# Patient Record
Sex: FEMALE | Race: WHITE | Employment: OTHER | URBAN - METROPOLITAN AREA
[De-identification: names, ages, dates, MRNs, and addresses within clinical notes are randomized per-mention and may not be internally consistent; named-entity substitution may affect disease eponyms.]

---

## 2017-02-07 PROBLEM — R31.29 MICROHEMATURIA: Status: ACTIVE | Noted: 2017-02-07

## 2018-10-01 PROBLEM — R19.5 POSITIVE FIT (FECAL IMMUNOCHEMICAL TEST): Status: ACTIVE | Noted: 2018-10-01

## 2019-03-26 PROBLEM — R19.5 POSITIVE FIT (FECAL IMMUNOCHEMICAL TEST): Status: RESOLVED | Noted: 2018-10-01 | Resolved: 2019-03-26

## 2021-12-23 PROBLEM — N18.32 CHRONIC RENAL FAILURE, STAGE 3B (HCC): Status: ACTIVE | Noted: 2021-12-23

## 2023-01-09 ENCOUNTER — HOSPITAL ENCOUNTER (INPATIENT)
Age: 83
LOS: 8 days | Discharge: NURSING, REHAB OR LONG-TERM CARE | DRG: 981 | End: 2023-01-17
Attending: EMERGENCY MEDICINE | Admitting: INTERNAL MEDICINE
Payer: COMMERCIAL

## 2023-01-09 DIAGNOSIS — R41.82 ALTERED MENTAL STATUS, UNSPECIFIED ALTERED MENTAL STATUS TYPE: Primary | ICD-10-CM

## 2023-01-09 DIAGNOSIS — E87.1 HYPONATREMIA: ICD-10-CM

## 2023-01-09 DIAGNOSIS — M19.90 ARTHRITIS: ICD-10-CM

## 2023-01-09 LAB
ABSOLUTE EOS #: <0.03 K/UL (ref 0–0.44)
ABSOLUTE IMMATURE GRANULOCYTE: 0.28 K/UL (ref 0–0.3)
ABSOLUTE LYMPH #: 1.01 K/UL (ref 1.1–3.7)
ABSOLUTE MONO #: 1.1 K/UL (ref 0.1–1.2)
ACETAMINOPHEN LEVEL: <5 UG/ML (ref 10–30)
ALBUMIN SERPL-MCNC: 3.8 G/DL (ref 3.5–5.2)
ALBUMIN/GLOBULIN RATIO: 1.1 (ref 1–2.5)
ALP BLD-CCNC: 82 U/L (ref 35–104)
ALT SERPL-CCNC: 22 U/L (ref 5–33)
AMMONIA: 25 UMOL/L (ref 11–51)
ANION GAP SERPL CALCULATED.3IONS-SCNC: 17 MMOL/L (ref 9–17)
ANION GAP: 17 MMOL/L (ref 7–16)
AST SERPL-CCNC: 41 U/L
BASOPHILS # BLD: 0 % (ref 0–2)
BASOPHILS ABSOLUTE: <0.03 K/UL (ref 0–0.2)
BILIRUB SERPL-MCNC: 0.5 MG/DL (ref 0.3–1.2)
BILIRUBIN URINE: NEGATIVE
BUN BLDV-MCNC: 9 MG/DL (ref 8–23)
CALCIUM SERPL-MCNC: 8.8 MG/DL (ref 8.6–10.4)
CASTS UA: NORMAL /LPF (ref 0–8)
CHLORIDE BLD-SCNC: 82 MMOL/L (ref 98–107)
CHLORIDE, UR: 92 MMOL/L
CO2: 17 MMOL/L (ref 20–31)
COLOR: YELLOW
CREAT SERPL-MCNC: 0.71 MG/DL (ref 0.5–0.9)
EGFR, POC: >60 ML/MIN/1.73M2
EOSINOPHILS RELATIVE PERCENT: 0 % (ref 1–4)
EPITHELIAL CELLS UA: NORMAL /HPF (ref 0–5)
ETHANOL PERCENT: <0.01 %
ETHANOL: <10 MG/DL
GFR SERPL CREATININE-BSD FRML MDRD: >60 ML/MIN/1.73M2
GLUCOSE BLD-MCNC: 104 MG/DL (ref 74–100)
GLUCOSE BLD-MCNC: 98 MG/DL (ref 70–99)
GLUCOSE URINE: NEGATIVE
HCO3 VENOUS: 19.2 MMOL/L (ref 22–29)
HCT VFR BLD CALC: 31.9 % (ref 36.3–47.1)
HEMOGLOBIN: 11.3 G/DL (ref 11.9–15.1)
IMMATURE GRANULOCYTES: 3 %
INR BLD: 1
KETONES, URINE: ABNORMAL
LEUKOCYTE ESTERASE, URINE: NEGATIVE
LIPASE: 51 U/L (ref 13–60)
LYMPHOCYTES # BLD: 9 % (ref 24–43)
MCH RBC QN AUTO: 29.7 PG (ref 25.2–33.5)
MCHC RBC AUTO-ENTMCNC: 35.4 G/DL (ref 28.4–34.8)
MCV RBC AUTO: 83.9 FL (ref 82.6–102.9)
MONOCYTES # BLD: 10 % (ref 3–12)
NEGATIVE BASE EXCESS, VEN: 4 (ref 0–2)
NITRITE, URINE: NEGATIVE
NRBC AUTOMATED: 0 PER 100 WBC
O2 SAT, VEN: 78 % (ref 60–85)
OSMOLALITY URINE: 442 MOSM/KG (ref 80–1300)
PCO2, VEN: 28 MM HG (ref 41–51)
PDW BLD-RTO: 13 % (ref 11.8–14.4)
PH UA: 7.5 (ref 5–8)
PH VENOUS: 7.44 (ref 7.32–7.43)
PLATELET # BLD: ABNORMAL K/UL (ref 138–453)
PLATELET, FLUORESCENCE: 381 K/UL (ref 138–453)
PLATELET, IMMATURE FRACTION: 6.5 % (ref 1.1–10.3)
PO2, VEN: 40 MM HG (ref 30–50)
POC BUN: 8 MG/DL (ref 8–26)
POC CHLORIDE: 83 MMOL/L (ref 98–107)
POC CREATININE: 0.76 MG/DL (ref 0.51–1.19)
POC HEMATOCRIT: 38 % (ref 36–46)
POC HEMOGLOBIN: 13 G/DL (ref 12–16)
POC IONIZED CALCIUM: 1.08 MMOL/L (ref 1.15–1.33)
POC LACTIC ACID: 0.84 MMOL/L (ref 0.56–1.39)
POC POTASSIUM: 3.5 MMOL/L (ref 3.5–4.5)
POC SODIUM: 117 MMOL/L (ref 138–146)
POC TCO2: 18 MMOL/L (ref 22–30)
POTASSIUM SERPL-SCNC: 3.5 MMOL/L (ref 3.7–5.3)
PRO-BNP: 884 PG/ML
PROTEIN UA: ABNORMAL
PROTHROMBIN TIME: 10.8 SEC (ref 9.1–12.3)
RBC # BLD: 3.8 M/UL (ref 3.95–5.11)
RBC UA: NORMAL /HPF (ref 0–4)
SALICYLATE LEVEL: <1 MG/DL (ref 3–10)
SEG NEUTROPHILS: 78 % (ref 36–65)
SEGMENTED NEUTROPHILS ABSOLUTE COUNT: 8.69 K/UL (ref 1.5–8.1)
SODIUM BLD-SCNC: 116 MMOL/L (ref 135–144)
SODIUM BLD-SCNC: 117 MMOL/L (ref 135–144)
SODIUM,UR: 114 MMOL/L
SPECIFIC GRAVITY UA: 1.04 (ref 1–1.03)
TOTAL PROTEIN: 7.3 G/DL (ref 6.4–8.3)
TOXIC TRICYCLIC SC,BLOOD: NEGATIVE
TROPONIN, HIGH SENSITIVITY: 15 NG/L (ref 0–14)
TSH SERPL DL<=0.05 MIU/L-ACNC: 7.06 UIU/ML (ref 0.3–5)
TURBIDITY: CLEAR
URINE HGB: ABNORMAL
UROBILINOGEN, URINE: NORMAL
WBC # BLD: 11.1 K/UL (ref 3.5–11.3)
WBC UA: NORMAL /HPF (ref 0–5)

## 2023-01-09 PROCEDURE — 96374 THER/PROPH/DIAG INJ IV PUSH: CPT

## 2023-01-09 PROCEDURE — 2580000003 HC RX 258: Performed by: INTERNAL MEDICINE

## 2023-01-09 PROCEDURE — 82947 ASSAY GLUCOSE BLOOD QUANT: CPT

## 2023-01-09 PROCEDURE — 84300 ASSAY OF URINE SODIUM: CPT

## 2023-01-09 PROCEDURE — G0480 DRUG TEST DEF 1-7 CLASSES: HCPCS

## 2023-01-09 PROCEDURE — 6360000002 HC RX W HCPCS

## 2023-01-09 PROCEDURE — 83935 ASSAY OF URINE OSMOLALITY: CPT

## 2023-01-09 PROCEDURE — 80053 COMPREHEN METABOLIC PANEL: CPT

## 2023-01-09 PROCEDURE — 83735 ASSAY OF MAGNESIUM: CPT

## 2023-01-09 PROCEDURE — 83930 ASSAY OF BLOOD OSMOLALITY: CPT

## 2023-01-09 PROCEDURE — 85055 RETICULATED PLATELET ASSAY: CPT

## 2023-01-09 PROCEDURE — 82330 ASSAY OF CALCIUM: CPT

## 2023-01-09 PROCEDURE — 82140 ASSAY OF AMMONIA: CPT

## 2023-01-09 PROCEDURE — 84100 ASSAY OF PHOSPHORUS: CPT

## 2023-01-09 PROCEDURE — 80307 DRUG TEST PRSMV CHEM ANLYZR: CPT

## 2023-01-09 PROCEDURE — 84295 ASSAY OF SERUM SODIUM: CPT

## 2023-01-09 PROCEDURE — 84443 ASSAY THYROID STIM HORMONE: CPT

## 2023-01-09 PROCEDURE — 84439 ASSAY OF FREE THYROXINE: CPT

## 2023-01-09 PROCEDURE — 85025 COMPLETE CBC W/AUTO DIFF WBC: CPT

## 2023-01-09 PROCEDURE — 6360000002 HC RX W HCPCS: Performed by: STUDENT IN AN ORGANIZED HEALTH CARE EDUCATION/TRAINING PROGRAM

## 2023-01-09 PROCEDURE — 84484 ASSAY OF TROPONIN QUANT: CPT

## 2023-01-09 PROCEDURE — 80143 DRUG ASSAY ACETAMINOPHEN: CPT

## 2023-01-09 PROCEDURE — 85610 PROTHROMBIN TIME: CPT

## 2023-01-09 PROCEDURE — 06HY33Z INSERTION OF INFUSION DEVICE INTO LOWER VEIN, PERCUTANEOUS APPROACH: ICD-10-PCS | Performed by: EMERGENCY MEDICINE

## 2023-01-09 PROCEDURE — 93005 ELECTROCARDIOGRAM TRACING: CPT | Performed by: STUDENT IN AN ORGANIZED HEALTH CARE EDUCATION/TRAINING PROGRAM

## 2023-01-09 PROCEDURE — 99285 EMERGENCY DEPT VISIT HI MDM: CPT

## 2023-01-09 PROCEDURE — 83605 ASSAY OF LACTIC ACID: CPT

## 2023-01-09 PROCEDURE — 80179 DRUG ASSAY SALICYLATE: CPT

## 2023-01-09 PROCEDURE — 83880 ASSAY OF NATRIURETIC PEPTIDE: CPT

## 2023-01-09 PROCEDURE — 82436 ASSAY OF URINE CHLORIDE: CPT

## 2023-01-09 PROCEDURE — 80051 ELECTROLYTE PANEL: CPT

## 2023-01-09 PROCEDURE — 85014 HEMATOCRIT: CPT

## 2023-01-09 PROCEDURE — 2000000000 HC ICU R&B

## 2023-01-09 PROCEDURE — 83690 ASSAY OF LIPASE: CPT

## 2023-01-09 PROCEDURE — 81001 URINALYSIS AUTO W/SCOPE: CPT

## 2023-01-09 PROCEDURE — 82803 BLOOD GASES ANY COMBINATION: CPT

## 2023-01-09 PROCEDURE — 84520 ASSAY OF UREA NITROGEN: CPT

## 2023-01-09 PROCEDURE — 82565 ASSAY OF CREATININE: CPT

## 2023-01-09 PROCEDURE — 96375 TX/PRO/DX INJ NEW DRUG ADDON: CPT

## 2023-01-09 RX ORDER — MORPHINE SULFATE 4 MG/ML
2 INJECTION, SOLUTION INTRAMUSCULAR; INTRAVENOUS ONCE
Status: COMPLETED | OUTPATIENT
Start: 2023-01-09 | End: 2023-01-09

## 2023-01-09 RX ORDER — ONDANSETRON 2 MG/ML
INJECTION INTRAMUSCULAR; INTRAVENOUS
Status: COMPLETED
Start: 2023-01-09 | End: 2023-01-09

## 2023-01-09 RX ORDER — ONDANSETRON 2 MG/ML
4 INJECTION INTRAMUSCULAR; INTRAVENOUS ONCE
Status: COMPLETED | OUTPATIENT
Start: 2023-01-09 | End: 2023-01-09

## 2023-01-09 RX ORDER — 3% SODIUM CHLORIDE 3 G/100ML
30 INJECTION, SOLUTION INTRAVENOUS CONTINUOUS
Status: DISCONTINUED | OUTPATIENT
Start: 2023-01-09 | End: 2023-01-10

## 2023-01-09 RX ADMIN — ONDANSETRON 4 MG: 2 INJECTION INTRAMUSCULAR; INTRAVENOUS at 21:48

## 2023-01-09 RX ADMIN — MORPHINE SULFATE 2 MG: 4 INJECTION INTRAVENOUS at 22:03

## 2023-01-09 RX ADMIN — SODIUM CHLORIDE 25 ML/HR: 3 INJECTION, SOLUTION INTRAVENOUS at 23:54

## 2023-01-09 ASSESSMENT — PAIN SCALES - GENERAL
PAINLEVEL_OUTOF10: 10
PAINLEVEL_OUTOF10: 10

## 2023-01-09 ASSESSMENT — PAIN - FUNCTIONAL ASSESSMENT: PAIN_FUNCTIONAL_ASSESSMENT: 0-10

## 2023-01-10 ENCOUNTER — APPOINTMENT (OUTPATIENT)
Dept: MRI IMAGING | Age: 83
DRG: 981 | End: 2023-01-10
Payer: COMMERCIAL

## 2023-01-10 ENCOUNTER — APPOINTMENT (OUTPATIENT)
Dept: GENERAL RADIOLOGY | Age: 83
DRG: 981 | End: 2023-01-10
Payer: COMMERCIAL

## 2023-01-10 PROBLEM — R41.82 ALTERED MENTAL STATUS: Status: ACTIVE | Noted: 2023-01-10

## 2023-01-10 PROBLEM — G45.9 TIA (TRANSIENT ISCHEMIC ATTACK): Status: ACTIVE | Noted: 2023-01-10

## 2023-01-10 PROBLEM — I67.1: Status: ACTIVE | Noted: 2023-01-10

## 2023-01-10 PROBLEM — R56.9 SEIZURE-LIKE ACTIVITY (HCC): Status: ACTIVE | Noted: 2023-01-10

## 2023-01-10 PROBLEM — I72.0 CAROTID ANEURYSM, LEFT (HCC): Status: ACTIVE | Noted: 2023-01-10

## 2023-01-10 LAB
CORTISOL: 18.2 UG/DL (ref 2.7–18.4)
EKG ATRIAL RATE: 85 BPM
EKG P AXIS: 50 DEGREES
EKG P-R INTERVAL: 164 MS
EKG Q-T INTERVAL: 388 MS
EKG QRS DURATION: 72 MS
EKG QTC CALCULATION (BAZETT): 461 MS
EKG R AXIS: 50 DEGREES
EKG T AXIS: 31 DEGREES
EKG VENTRICULAR RATE: 85 BPM
GLUCOSE BLD-MCNC: 84 MG/DL (ref 65–105)
MAGNESIUM: 1.5 MG/DL (ref 1.6–2.6)
MAGNESIUM: 1.7 MG/DL (ref 1.6–2.6)
MRSA, DNA, NASAL: NEGATIVE
PHOSPHORUS: 2.5 MG/DL (ref 2.6–4.5)
SERUM OSMOLALITY: 252 MOSM/KG (ref 275–295)
SODIUM BLD-SCNC: 113 MMOL/L (ref 135–144)
SODIUM BLD-SCNC: 114 MMOL/L (ref 135–144)
SODIUM BLD-SCNC: 120 MMOL/L (ref 135–144)
SODIUM BLD-SCNC: 121 MMOL/L (ref 135–144)
SODIUM BLD-SCNC: 122 MMOL/L (ref 135–144)
SODIUM BLD-SCNC: 123 MMOL/L (ref 135–144)
SODIUM BLD-SCNC: 123 MMOL/L (ref 135–144)
SPECIMEN DESCRIPTION: NORMAL
THYROXINE, FREE: 1.31 NG/DL (ref 0.93–1.7)
TROPONIN, HIGH SENSITIVITY: 13 NG/L (ref 0–14)

## 2023-01-10 PROCEDURE — 6360000002 HC RX W HCPCS

## 2023-01-10 PROCEDURE — 2000000000 HC ICU R&B

## 2023-01-10 PROCEDURE — 6370000000 HC RX 637 (ALT 250 FOR IP): Performed by: STUDENT IN AN ORGANIZED HEALTH CARE EDUCATION/TRAINING PROGRAM

## 2023-01-10 PROCEDURE — 87641 MR-STAPH DNA AMP PROBE: CPT

## 2023-01-10 PROCEDURE — 2580000003 HC RX 258

## 2023-01-10 PROCEDURE — 70551 MRI BRAIN STEM W/O DYE: CPT

## 2023-01-10 PROCEDURE — 95816 EEG AWAKE AND DROWSY: CPT | Performed by: PSYCHIATRY & NEUROLOGY

## 2023-01-10 PROCEDURE — 82947 ASSAY GLUCOSE BLOOD QUANT: CPT

## 2023-01-10 PROCEDURE — 99223 1ST HOSP IP/OBS HIGH 75: CPT | Performed by: PSYCHIATRY & NEUROLOGY

## 2023-01-10 PROCEDURE — 84295 ASSAY OF SERUM SODIUM: CPT

## 2023-01-10 PROCEDURE — 82533 TOTAL CORTISOL: CPT

## 2023-01-10 PROCEDURE — 99222 1ST HOSP IP/OBS MODERATE 55: CPT | Performed by: INTERNAL MEDICINE

## 2023-01-10 PROCEDURE — 93010 ELECTROCARDIOGRAM REPORT: CPT | Performed by: INTERNAL MEDICINE

## 2023-01-10 PROCEDURE — 95816 EEG AWAKE AND DROWSY: CPT

## 2023-01-10 PROCEDURE — 84484 ASSAY OF TROPONIN QUANT: CPT

## 2023-01-10 PROCEDURE — 99291 CRITICAL CARE FIRST HOUR: CPT | Performed by: INTERNAL MEDICINE

## 2023-01-10 PROCEDURE — 71045 X-RAY EXAM CHEST 1 VIEW: CPT

## 2023-01-10 PROCEDURE — 36415 COLL VENOUS BLD VENIPUNCTURE: CPT

## 2023-01-10 PROCEDURE — 6370000000 HC RX 637 (ALT 250 FOR IP)

## 2023-01-10 PROCEDURE — 83735 ASSAY OF MAGNESIUM: CPT

## 2023-01-10 PROCEDURE — 6360000002 HC RX W HCPCS: Performed by: STUDENT IN AN ORGANIZED HEALTH CARE EDUCATION/TRAINING PROGRAM

## 2023-01-10 PROCEDURE — 80048 BASIC METABOLIC PNL TOTAL CA: CPT

## 2023-01-10 RX ORDER — SODIUM CHLORIDE 9 MG/ML
INJECTION, SOLUTION INTRAVENOUS PRN
Status: DISCONTINUED | OUTPATIENT
Start: 2023-01-10 | End: 2023-01-17 | Stop reason: HOSPADM

## 2023-01-10 RX ORDER — ASPIRIN 81 MG/1
81 TABLET ORAL DAILY
Status: DISCONTINUED | OUTPATIENT
Start: 2023-01-10 | End: 2023-01-17 | Stop reason: HOSPADM

## 2023-01-10 RX ORDER — OXYCODONE HYDROCHLORIDE AND ACETAMINOPHEN 5; 325 MG/1; MG/1
1 TABLET ORAL EVERY 4 HOURS PRN
Status: DISCONTINUED | OUTPATIENT
Start: 2023-01-10 | End: 2023-01-10

## 2023-01-10 RX ORDER — ENOXAPARIN SODIUM 100 MG/ML
40 INJECTION SUBCUTANEOUS DAILY
Status: DISCONTINUED | OUTPATIENT
Start: 2023-01-10 | End: 2023-01-17 | Stop reason: HOSPADM

## 2023-01-10 RX ORDER — ACETAMINOPHEN 325 MG/1
650 TABLET ORAL EVERY 6 HOURS PRN
Status: DISCONTINUED | OUTPATIENT
Start: 2023-01-10 | End: 2023-01-17 | Stop reason: HOSPADM

## 2023-01-10 RX ORDER — SODIUM CHLORIDE 0.9 % (FLUSH) 0.9 %
5-40 SYRINGE (ML) INJECTION EVERY 12 HOURS SCHEDULED
Status: DISCONTINUED | OUTPATIENT
Start: 2023-01-10 | End: 2023-01-17 | Stop reason: HOSPADM

## 2023-01-10 RX ORDER — POLYETHYLENE GLYCOL 3350 17 G/17G
17 POWDER, FOR SOLUTION ORAL DAILY PRN
Status: DISCONTINUED | OUTPATIENT
Start: 2023-01-10 | End: 2023-01-17 | Stop reason: HOSPADM

## 2023-01-10 RX ORDER — SODIUM CHLORIDE 0.9 % (FLUSH) 0.9 %
5-40 SYRINGE (ML) INJECTION PRN
Status: DISCONTINUED | OUTPATIENT
Start: 2023-01-10 | End: 2023-01-17 | Stop reason: HOSPADM

## 2023-01-10 RX ORDER — ONDANSETRON 2 MG/ML
4 INJECTION INTRAMUSCULAR; INTRAVENOUS EVERY 6 HOURS PRN
Status: DISCONTINUED | OUTPATIENT
Start: 2023-01-10 | End: 2023-01-12 | Stop reason: SDUPTHER

## 2023-01-10 RX ORDER — ONDANSETRON 4 MG/1
4 TABLET, ORALLY DISINTEGRATING ORAL EVERY 8 HOURS PRN
Status: DISCONTINUED | OUTPATIENT
Start: 2023-01-10 | End: 2023-01-12 | Stop reason: SDUPTHER

## 2023-01-10 RX ORDER — MAGNESIUM SULFATE IN WATER 40 MG/ML
2000 INJECTION, SOLUTION INTRAVENOUS ONCE
Status: COMPLETED | OUTPATIENT
Start: 2023-01-10 | End: 2023-01-10

## 2023-01-10 RX ORDER — OXYCODONE HYDROCHLORIDE AND ACETAMINOPHEN 5; 325 MG/1; MG/1
1 TABLET ORAL EVERY 6 HOURS PRN
Status: DISCONTINUED | OUTPATIENT
Start: 2023-01-10 | End: 2023-01-13

## 2023-01-10 RX ORDER — ACETAMINOPHEN 650 MG/1
650 SUPPOSITORY RECTAL EVERY 6 HOURS PRN
Status: DISCONTINUED | OUTPATIENT
Start: 2023-01-10 | End: 2023-01-17 | Stop reason: HOSPADM

## 2023-01-10 RX ORDER — LEVOTHYROXINE SODIUM 0.07 MG/1
75 TABLET ORAL DAILY
Status: DISCONTINUED | OUTPATIENT
Start: 2023-01-10 | End: 2023-01-17 | Stop reason: HOSPADM

## 2023-01-10 RX ADMIN — LEVOTHYROXINE SODIUM 75 MCG: 75 TABLET ORAL at 08:28

## 2023-01-10 RX ADMIN — SODIUM CHLORIDE, PRESERVATIVE FREE 10 ML: 5 INJECTION INTRAVENOUS at 08:37

## 2023-01-10 RX ADMIN — MAGNESIUM SULFATE HEPTAHYDRATE 2000 MG: 40 INJECTION, SOLUTION INTRAVENOUS at 09:14

## 2023-01-10 RX ADMIN — OXYCODONE HYDROCHLORIDE AND ACETAMINOPHEN 1 TABLET: 5; 325 TABLET ORAL at 15:22

## 2023-01-10 RX ADMIN — ENOXAPARIN SODIUM 40 MG: 100 INJECTION SUBCUTANEOUS at 08:28

## 2023-01-10 RX ADMIN — Medication 81 MG: at 08:28

## 2023-01-10 RX ADMIN — ACETAMINOPHEN 650 MG: 325 TABLET ORAL at 11:41

## 2023-01-10 RX ADMIN — ONDANSETRON 4 MG: 2 INJECTION INTRAMUSCULAR; INTRAVENOUS at 05:52

## 2023-01-10 RX ADMIN — SODIUM CHLORIDE 30 ML/HR: 3 INJECTION, SOLUTION INTRAVENOUS at 07:44

## 2023-01-10 RX ADMIN — SODIUM CHLORIDE, PRESERVATIVE FREE 10 ML: 5 INJECTION INTRAVENOUS at 21:00

## 2023-01-10 RX ADMIN — SODIUM CHLORIDE, PRESERVATIVE FREE 10 ML: 5 INJECTION INTRAVENOUS at 08:36

## 2023-01-10 ASSESSMENT — PAIN DESCRIPTION - LOCATION
LOCATION: FOOT
LOCATION: GENERALIZED
LOCATION: ANKLE
LOCATION: ANKLE

## 2023-01-10 ASSESSMENT — PAIN DESCRIPTION - ORIENTATION
ORIENTATION: LEFT

## 2023-01-10 ASSESSMENT — PAIN SCALES - GENERAL
PAINLEVEL_OUTOF10: 4
PAINLEVEL_OUTOF10: 2
PAINLEVEL_OUTOF10: 4
PAINLEVEL_OUTOF10: 6
PAINLEVEL_OUTOF10: 5
PAINLEVEL_OUTOF10: 8

## 2023-01-10 NOTE — CARE COORDINATION
01/10/23 1556   Service Assessment   Patient Orientation Alert and Oriented   Cognition Alert   History Provided By Patient; Child/Family   Primary Caregiver Other (Comment)  (pt in rehab at [facility])   Accompanied By/Relationship Daughter Flakito Mcgarry Members; Children; Other (Comment)  ([facility] staff)   PCP Verified by CM Yes  Holly Lux)   Last Visit to PCP Within last 3 months   Prior Functional Level Independent in ADLs/IADLs   Current Functional Level Assistance with the following:;Mobility; Toileting;Dressing   Can patient return to prior living arrangement Yes  (Return to [facility])   Ability to make needs known: Good   Financial Resources Medicare  ([employer] [employer])   Social/Functional History   Lives With [employer] (comment)  (Currently at [facility] for [employer])   Type of Home   (Plans are to return to facility for Rehab)   ADL Assistance Needs assistance   Toileting Needs assistance   Ambulation Assistance Needs assistance   Transfer Assistance Needs assistance   Active  Yes   Mode of Transportation Car   Occupation Retired   Type of Occupation    Discharge Planning   Type of [address] [place] [address] Prior To Admission [address]   Patient expects to be discharged to: Skilled nursing facility   History of falls? [address] Provided? No   [facility] at Discharge [facility] (see comment[facility]   Condition of Participation: Discharge Planning   The Plan for Transition of Care is related to the following treatment goals: Safety, comfort   The Patient and/or Patient Representative was provided with a Choice of Provider? Patient   The Patient and/Or Patient Representative agree with the Discharge Plan?  Yes   Freedom of Choice list was provided with basic dialogue that supports the patient's individualized plan of care/goals, treatment preferences, and shares the quality data associated with the providers? Yes  (Return to [facility]- bed hold, referral sent)   Case Management Assessment  Initial Evaluation    Date/Time of Evaluation: 1/10/2023 4:04 PM  Assessment Completed by: Nadia Friedman RN    If patient is discharged prior to next notation, then this note serves as note for discharge by case management. Patient Name: Ivett Jones                   YOB: 1940  Diagnosis: Hyponatremia [E87.1]  Altered mental status, unspecified altered mental status type [R41.82]                   Date / Time: 1/9/2023  8:01 PM    Patient Admission Status: Inpatient   Readmission Risk (Low < 19, Mod (19-27), High > 27): Readmission Risk Score: 8.4    Current PCP: Stanislav Carpio MD  PCP verified by CM? (P) Yes Stanislav Carpio)    Chart Reviewed: Yes      History Provided by: (P) Patient, Child/Family  Patient Orientation: (P) Alert and Oriented    Patient Cognition: (P) Alert    Hospitalization in the last 30 days (Readmission):  No    If yes, Readmission Assessment in CM Navigator will be completed. Advance Directives:      Code Status: Full Code   Patient's Primary Decision Maker is:      Primary Decision Maker (Active):  Elsi Bullock Child - [phone]    Discharge Planning:    Patient lives with:   Type of Home: (P) [place]  Primary Care Giver: (P) Other (Comment) (pt in rehab at [facility])  Patient Support Systems include: (P) Family Members, Children, Other (Comment) (SNF staff)   Current Financial resources: (P) Medicare ([place])  Current community resources:    Current services prior to admission: (P) Gildardo Vu            Current DME:              Type of Home Care services:       ADLS  Prior functional level: (P) Independent in ADLs/IADLs  Current functional level: (P) Assistance with the following:, Mobility, Toileting, Dressing    PT AM-PAC:   /24  OT AM-PAC:   /24    Family can provide assistance at DC: Would you like Case Management to discuss the discharge plan with any other family members/significant others, and if so, who? Plans to Return to Present Housing: (P) Yes (Return to [facility])  Other Identified Issues/Barriers to RETURNING to current housing: none  Potential Assistance needed at discharge: (P) Gildardo Vu            Potential DME:    Patient expects to discharge to: (P) [address] Lei [address] for transportation at discharge:      Financial    Payor: [employer] [address] / Plan: [employer] [address] / Product Type: *No Product type* /     Does insurance require precert for SNF: Yes    Potential assistance Purchasing Medications:    Meds-to-Beds request: Yes      [facility] [address]ES [address] [address]  Phone: [phone] Fax: [phone]    [facility] [address]ES [address] [place] [address]  Phone: [phone] Fax: [phone]    [address] [place] [address] Cesar Holley [address] [address] [facility]  [address] [place] [address] [address]  Phone: [phone] Fax: [phone]    [facility] [address] R[address] BR[address]GES [address] [facility] [address] [place] [address] [phone] - F [phone]  [facility]  Phone: [phone] Fax: [phone]    Skyla Parikh [id number] Jessica Bernard, [address] [phone] Xavier Yarbrough [phone]  [address] [place] [address]  Phone: [phone] Fax: [phone]      Notes:    Factors facilitating achievement of predicted outcomes: Family support and Caregiver support    Barriers to discharge: Medical complications    Additional Case Management Notes: Bed Hold at [facility]- needs Pre-cert, will need PT/OT notes. Seizure precautions, NA Q 2 hrs. MRI bran w/o contrast today. Neuro, Nephrology, Neuro-crit following. Referral to Endovascular Neurosurgery  [phone]. Pt is a bed hold, will need pre-cert and PT/OT notes to return. Referral sent. Needs PT/OT orders when appropriate. Pt has OP appt Friday @ [facility] with Tano Chan to change cast.     The Plan for Transition of Care is related to the following treatment goals of Hyponatremia [E87.1]  Altered mental status, unspecified altered mental status type [J64.49]    IF APPLICABLE: The Patient and/or patient representative Morgan Herman and her family were provided with a choice of provider and agrees with the discharge plan. Freedom of choice list with basic dialogue that supports the patient's individualized plan of care/goals and shares the quality data associated with the providers was provided to: (P) Patient   Patient Representative Name:       The Patient and/or Patient Representative Agree with the Discharge Plan?  (P) Yes    Zeynep Weir RN  Case Management Department  Ph: [phone] Fax: [phone]

## 2023-01-10 NOTE — PLAN OF CARE
Leigha Quick, RCPPatient Assessment complete. Hyponatremia [E87.1]  Altered mental status, unspecified altered mental status type [R41.82] . Vitals:    01/10/23 0700   BP: (!) 105/57   Pulse: 75   Resp: 14   Temp:    SpO2: 100%   . Patients home meds are   Prior to Admission medications    Medication Sig Start Date End Date Taking? Authorizing Provider   vitamin C (ASCORBIC ACID) 500 MG tablet Take 500 mg by mouth daily    Historical Provider, MD   Multiple Vitamins-Minerals (THERAPEUTIC MULTIVITAMIN-MINERALS) tablet Take 1 tablet by mouth daily    Historical Provider, MD   levothyroxine (SYNTHROID) 75 MCG tablet Take 1 tablet by mouth Daily 10/11/22   Maria D Mohr MD   aspirin EC 81 MG EC tablet Take 1 tablet by mouth daily 6/1/21   Maria D Mohr MD   Calcium Carb-Cholecalciferol 600-800 MG-UNIT TABS Take 1 tablet by mouth daily    Historical Provider, MD   traMADol (ULTRAM) 50 MG tablet Take 50 mg by mouth 2 times daily as needed. 9/29/14   Historical Provider, MD       Assessment // Pt is not showing any signs of distress. Pt does not take any home breathing treatments or require home oxygen. No SOB.         RR 14  Breath Sounds: clear      Bronchodilator assessment at level  1  [x]    Bronchodilator Assessment  BRONCHODILATOR ASSESSMENT SCORE  Score 0 1 2 3 4 5   Breath Sounds   [x]  Patient Baseline []  No Wheeze good aeration []  Faint, scattered wheezing, good aeration []  Expiratory Wheezing and or moderately diminished []  Insp/Exp wheeze and/or very diminished []  Insp/Exp and/ or marked distress   Respiratory Rate   []  Patient Baseline [x]  Less than 20 []  Less than 20 []  20-25 []  Greater than 25 []  Greater than 25   Peak flow % of Pred or PB []  NA   []  Greater than 90%  []  81-90% []  71-80% []  Less than or equal to 70%  or unable to perform []  Unable due to Respiratory Distress   Dyspnea re []  Patient Baseline [x]  No SOB []  No SOB []  SOB on exertion []  SOB min activity []  At rest/acute   e FEV% Predicted       [x]  NA []  Above 69%  []  Unable []  Above 60-69%  []  Unable []  Above 50-59%  []  Unable []  Above 35-49%  []  Unable []  Less than 35%  []  Unable

## 2023-01-10 NOTE — ED TRIAGE NOTES
Pt presents to ED as a transfer from [facility]. Pt was in rehab for injury, started to have left sided weakness and facial droop at 0900. Pt found to have ICA aneurysm and hyponatremia.

## 2023-01-10 NOTE — CONSULTS
Department of stroke/Endovascular Neurology                                           Resident Consult Note                                                     Paged at: 8:12                                                     Arrived at: 8:17  ED bed: 25  Reason for Consult:   L sided weakness  Stroke attending:  Dr. Jimenes  Endovascular Neurosurgeon:   []Dr. Perez  [x]Dr. Burdick    History Obtained From:  electronic medical record    CHIEF COMPLAINT:       L sided weakness    HISTORY OF PRESENT ILLNESS:       The patient is a 82 y.o. female who presents with left upper extremity numbness and weakness, left facial droop and altered mentation.  Patient has a past medical history of CAD/PVD on aspirin and Lipitor as well as hypothyroidism on Synthroid.  Patient recently underwent fixation of right ankle by orthopedic surgery, continued with aspirin throughout.  Patient presented from acute rehab unit after they had noticed that her left upper extremity was weak and on evaluation some numbness was present.  She was taken to [facility] ED where she was evaluated for potential stroke.  CT head was unremarkable for any acute changes, CTA did not show a left ICA aneurysm but no LVO or critical stenosis.  At this time films are not available on PACS, read based on report.  Of note patient's sodium was found to be 116 on initial labs.    Last known well reportedly around 9 AM.  She was transferred to [facility] ED for medical ICU admission for management of hyponatremia.  Upon arrival to [facility], patient's left-sided weakness and numbness had resolved.  Patient does appear encephalopathic and requires repeated stimulation to arouse.  Initial NIH was 7 for level of consciousness, commands and questions, mild dysmetria in general but equal mild weakness of both legs.  With repeated prompting, patient was able to maintain both arms in the air for the full 10 seconds.  Initial  blood pressure 118, blood glucose 125. Of note patient is on tramadol 50 mg twice daily chronically. NIH Stroke Scale Total (if not done complete detailed one below):    1a.  Level of consciousness:  2 - not alert, requires repeated stimulation to attend, or is obtunded and requires strong or painful stimulation to make movements (not stereotyped)   1b. Level of consciousness questions:  1 - answers one question correctly  1c. Level of consciousness questions:  1 - performs one task correctly  2. Best Gaze:  0 - normal  3. Visual:  0 - no visual loss  4. Facial Palsy:  1 - minor paralysis (flattened nasolabial fold, asymmetric on smiling)  5a. Motor left arm:  0 - no drift, limb holds 90 (or 45) degrees for full 10 seconds  5b. Motor right arm:  0 - no drift, limb holds 90 (or 45) degrees for full 10 seconds  6a. Motor left le - drift; leg falls by the end of the 5 second period but does not hit bed  6b. Motor right le - drift; leg falls by the end of the 5 second period but does not hit bed  7. Limb Ataxia:  1 - present in one limb  8. Sensory:  0 - normal; no sensory loss  9. Best Language:  0 - no aphasia, normal  10. Dysarthria:  0 - normal  11. Extinction and Inattention:  0 - no abnormality          ABCD2 Score  (Estimate Risk of Stroke after TIA)   POINTS   Age    < 60   ? 60     [] 0  [x] 1   BP:     SBP <140 or DBP < 90   SBP ? 140 or DBP ? 90       [x] 0  [] 1   Clinical Features of TIA     Other Symptoms                 Speech Disturbances W/O Weakness   Unilateral Weakness     [] 0  [] 1  [x] 2   Duration of symptoms     < 10 Minutes                                     10-59 Minutes   ?  61 Minutes     [] 0  [x] 1  [] 2   Diabetes     No                    Yes     [x] 0  [] 1   TOTAL 4   0-3 Points: Low Risk -> Work up could be done OPD   2-Day Stroke Risk: 1%   7- Day Stroke Risk: 1.2%   90 Days Stroke Risk: 3.1%    4-5 Points: Moderate Risk    2-Day Stroke Risk: 4.1%   7- Day Stroke Risk: 5.9%    90 Days Stroke Risk: 9.8%    6-7 Points: High Risk -> Warrant Admission for Workup   2-Day Stroke Risk: 8.1%   7- Day Stroke Risk: 11.7%   90 Days Stroke Risk: 17.8%      Modified Mauricio Score Scale:     [] Zero: No symptoms at all   [] 1: No significant disability despite symptoms; able to carry out all usual duties and activities   [] 2: Slight disability; unable to carry out all previous activities, but able to look after own affairs without assistance   [] 3:Moderate disability; requiring some help, but able to walk without assistance   [x] 4: Moderately severe disability; unable to walk and attend to bodily needs without assistance   [] 5:Severe disability; bedridden, incontinent and requiring constant nursing care and attention         PAST MEDICAL HISTORY :       Past Medical History:        Diagnosis Date    Arthritis 10/08/2014    CAD (coronary artery disease) 10/08/2014    Edema 10/08/2014    Hematuria     Hypotension, unspecified 10/08/2014    Pure hypercholesterolemia 10/08/2014    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 Frank Rojas        Social History:   Social History     Socioeconomic History    Marital status:      Spouse name: Not on file    Number of children: 2    Years of education: Not on file    Highest education level: Not on file   Occupational History    Occupation:    Tobacco Use    Smoking status: Former     Packs/day: 0.50     Years: 11.00     Pack years: 5.50     Types: Cigarettes     Quit date: 3/26/2008     Years since quittin.8    Smokeless tobacco: Never   Substance and Sexual Activity    Alcohol use:  No Drug use: No    Sexual activity: Not Currently   Other Topics Concern    Not on file   Social History Narrative    Not on file     Social Determinants of Health     Financial Resource Strain: Low Risk     Difficulty of Paying Living Expenses: Not hard at all   Food Insecurity: No Food Insecurity    Worried About Running Out of Food in the Last Year: Never true    Ran Out of Food in the Last Year: Never true   Transportation Needs: Not on file   Physical Activity: Inactive    Days of Exercise per Week: 0 days    Minutes of Exercise per Session: 0 min   Stress: Not on file   Social Connections: Not on file   Intimate Partner Violence: Not on file   Housing Stability: Not on file       Family History:       Adopted: Yes       Allergies:  Doxycycline, Pcn [penicillins], Pneumococcal vaccines, and Tetanus toxoids    Home Medications:  Prior to Admission medications    Medication Sig Start Date End Date Taking? Authorizing Provider   vitamin C (ASCORBIC ACID) 500 MG tablet Take 500 mg by mouth daily    Historical Provider, MD   Multiple Vitamins-Minerals (THERAPEUTIC MULTIVITAMIN-MINERALS) tablet Take 1 tablet by mouth daily    Historical Provider, MD   levothyroxine (SYNTHROID) 75 MCG tablet Take 1 tablet by mouth Daily 10/11/22   Hyla Cushing, MD   aspirin EC 81 MG EC tablet Take 1 tablet by mouth daily 6/1/21   Hyla Cushing, MD   Calcium Carb-Cholecalciferol 600-800 MG-UNIT TABS Take 1 tablet by mouth daily    Historical Provider, MD   traMADol (ULTRAM) 50 MG tablet Take 50 mg by mouth 2 times daily as needed. 9/29/14   Historical Provider, MD       Current Medications:   No current facility-administered medications for this encounter.     REVIEW OF SYSTEMS:       Unable to assess due to patient mentation    PHYSICAL EXAM:       /62   Pulse 72   Temp 98.7 °F (37.1 °C) (Oral)   Resp 15   Wt 124 lb (56.2 kg)   SpO2 96%   BMI 22.68 kg/m²     CONSTITUTIONAL:  Somnolent, difficult to arouse, requires repeated stimulation GCS 13, nontoxic. No dysarthria, no aphasia. EOMI. HEAD:  normocephalic, atraumatic    EYES:  PERRLA, EOMI.   ENT:  moist mucous membranes   NECK:  supple, symmetric, no midline tenderness to palpation    BACK:  without midline tenderness, step-offs or deformities    LUNGS:  Equal air entry bilaterally   CARDIOVASCULAR:  normal s1 / s2   ABDOMEN:  Soft, no rigidity   NEUROLOGIC:  Mental Status:  Not oriented to date and Not oriented to place,somnolent             Cranial Nerves:    cranial nerves II-XII are grossly intact    Motor Exam:    Drift:  present -bilateral lower extremities  Tone:  normal    Sensory:    Touch:    Right Upper Extremity:  normal  Left Upper Extremity:  normal  Right Lower Extremity:  normal  Left Lower Extremity:  normal      Plantar Response:  Right:  downgoing  Left:  downgoing    Coordination/Dysmetria:  Heel to Shin:  Right:  normal  Left:  normal  Finger to Nose:   Right:  normal  Left: Mild dysmetria          INITIAL NIH STROKE SCALE:    Time Performed:  8:17 PM     1a. Level of consciousness:  2 - not alert, requires repeated stimulation to attend, or is obtunded and requires strong or painful stimulation to make movements (not stereotyped)   1b. Level of consciousness questions:  1 - answers one question correctly  1c. Level of consciousness questions:  1 - performs one task correctly  2. Best Gaze:  0 - normal  3. Visual:  0 - no visual loss  4. Facial Palsy:  1 - minor paralysis (flattened nasolabial fold, asymmetric on smiling)  5a. Motor left arm:  0 - no drift, limb holds 90 (or 45) degrees for full 10 seconds  5b. Motor right arm:  0 - no drift, limb holds 90 (or 45) degrees for full 10 seconds  6a. Motor left le - drift; leg falls by the end of the 5 second period but does not hit bed  6b. Motor right le - drift; leg falls by the end of the 5 second period but does not hit bed  7.     Limb Ataxia:  1 - present in one limb  8. Sensory:  0 - normal; no sensory loss  9. Best Language:  0 - no aphasia, normal  10. Dysarthria:  0 - normal  11. Extinction and Inattention:  0 - no abnormality    TOTAL:  7     SKIN:  no rash      LABS AND IMAGING:     CBC with Differential:    Lab Results   Component Value Date/Time    WBC 11.1 01/09/2023 08:24 PM    RBC 3.80 01/09/2023 08:24 PM    HGB 11.3 01/09/2023 08:24 PM    HCT 31.9 01/09/2023 08:24 PM    PLT See Reflexed IPF Result 01/09/2023 08:24 PM    MCV 83.9 01/09/2023 08:24 PM    MCH 29.7 01/09/2023 08:24 PM    MCHC 35.4 01/09/2023 08:24 PM    RDW 13.0 01/09/2023 08:24 PM    LYMPHOPCT 9 01/09/2023 08:24 PM    LYMPHOPCT 56.1 05/04/2021 12:00 AM    MONOPCT 10 01/09/2023 08:24 PM    MONOPCT 11.7 05/04/2021 12:00 AM    EOSPCT 1.3 05/04/2021 12:00 AM    BASOPCT 0 01/09/2023 08:24 PM    BASOPCT 0.6 05/04/2021 12:00 AM    MONOSABS 1.10 01/09/2023 08:24 PM    MONOSABS 0.7 05/04/2021 12:00 AM    LYMPHSABS 1.01 01/09/2023 08:24 PM    LYMPHSABS 3.1 05/04/2021 12:00 AM    EOSABS <0.03 01/09/2023 08:24 PM    EOSABS 0.1 05/04/2021 12:00 AM    BASOSABS <0.03 01/09/2023 08:24 PM     BMP:    Lab Results   Component Value Date/Time     01/09/2023 08:24 PM    K 3.5 01/09/2023 08:24 PM    CL 82 01/09/2023 08:24 PM    CO2 17 01/09/2023 08:24 PM    BUN 9 01/09/2023 08:24 PM    LABALBU 3.8 01/09/2023 08:24 PM    CREATININE 0.71 01/09/2023 08:24 PM    CREATININE 0.76 01/09/2023 08:08 PM    CALCIUM 8.8 01/09/2023 08:24 PM    LABGLOM >60 01/09/2023 08:24 PM    GLUCOSE 98 01/09/2023 08:24 PM       Radiology Review:    Images not available in PACS at the time of consult, will review independently once they become available.   Following based on radiology report    CTH: No acute intracranial abnormalities  CTA h&N: Left ICA aneurysm, no reported LVO or critical stenosis        ASSESSMENT AND PLAN:       Patient Active Problem List   Diagnosis    Hypothyroidism    Pure hypercholesterolemia    Specified congenital anomalies of hair    Vitamin D deficiency    Arthritis    CAD (coronary artery disease)    Hearing loss    Osteopenia    History of DVT (deep vein thrombosis)    Microhematuria    Chronic renal failure, stage 3b (HCC)         Last Known Well (date and time): 1/9/2023 900    2. Candidate for IV tPA therapy     Yes []     No  [x] due to the following exclusion criteria: Outside tPA window    3. Candidate for Thrombectomy    Yes []      No [x] due to the following exclusion criteria: No LVO or critical stenosis      [de-identified] y.o. female who presents with left-sided flaccid paralysis and altered mental status. Was found to have sodium of 116. Takes aspirin and Lipitor at home as well as tramadol long-term. CT head unremarkable, CTA shows left ICA aneurysm which does not correlate with left-sided symptoms. Differential diagnosis includes provoked seizure secondary to hyponatremia and concomitant tramadol use versus TIA. -MRI brain without contrast  -Routine 30-minute EEG  -We will hold off antiepileptics for now  -Continue aspirin 81 mg daily and Lipitor 20 mg daily for now  - Telemetry   - Neuro checks per protocol  - We recommend SBP normotensive  - Discussed with Dr. Simon Steiner      Additional recommendations may follow  Please contact [facility] with any changes in patients neurologic status. Thank you for your consult.        Felice Comer MD   1/9/2023  9:36 PM

## 2023-01-10 NOTE — PROGRESS NOTES
INTENSIVE CARE UNIT  Resident Physician Progress Note    Patient - Mary Madsen  Date of Admission -  1/9/2023  8:01 PM  Date of Evaluation -  1/10/2023  Room and Bed Number -  2964/7397-91   Hospital Day - 1    SUBJECTIVE:   HISTORY OF PRESENT ILLNESS:    Mary Madsen is a [de-identified] y.o. woman w/hx of hypothyroidism, osteopenia, and recent right ankle fracturewho presented to OSH for PT for a right broken ankle and was unable to perform exercises due to flaccid paralysis of the LUE and reduced sensation. She had partial resolution of symptoms by the time of presentation. W/u noted critical hyponatremia, and CTA showed ICA aneurysm. Neurology is seeing for stroke w/u and nephro is following for hyponatremia, started on hypertonic saline. Patient denies previous episodes of hyponatremia and denies alcohol or tobacco use.     OVERNIGHT EVENTS:      No hemodynamic concerns    Na 116 ->113 this AM w/goal of 120 before stopping hypertonic saline  Mag 1.5 this AM  BUN/Cr 9/0.71  Nephro following, continue hypertonic saline   - urine Na 114  - TSH 7.06, T4 in process   - cortisol pending    NPO    MRI pending  EEG pending  Neuro holding off antiepileptics    TODAY:     AWAKE & FOLLOWING COMMANDS: []   No  [x]   Yes                           SECRETIONS                 Amount:  []   Small []   Moderate []   Large []   None        Color: []   White []   Colored []   Bloody                         SEDATION:                 RAAS Score: []   +1 to -1 []   -2 []   -3 []   -4        Sedation Agent: []   Propofol gtt []   Versed gtt  []   Ativan gtt  []   Precedex gtt        Paralytic Agent: []   Norcuron []   Nimbex []   None                         VASOPRESSORS:  []   No  []   Yes            Vasopressor Agent [] Levophed [] Dopamine [] Vasopressin [] Dobutamine [] Phenylephrine [] Epinephrine                  WEAVER [] No [x] Yes                          CENTRAL LINE [] No [x] Yes                          ARTERIAL LINE [x] No [] Yes                            OBJECTIVE:   VITAL SIGNS:  Patient Vitals for the past 8 hrs:   BP Temp Temp src Pulse Resp SpO2 Height Weight   01/10/23 0700 (!) 105/57 -- -- 75 14 100 % -- --   01/10/23 0600 (!) 115/58 -- -- 72 14 99 % -- --   01/10/23 0530 -- -- -- 72 -- -- -- --   01/10/23 0521 -- 99 °F (37.2 °C) -- -- -- 100 % 5' 3\" (1.6 m) 119 lb 11.2 oz (54.3 kg)   01/10/23 0520 (!) 103/56 99 °F (37.2 °C) Oral 83 14 -- -- --   01/10/23 0340 92/66 -- -- 83 16 96 % -- --   01/10/23 0235 100/65 -- -- 81 16 96 % -- --   01/10/23 0210 110/66 -- -- 76 13 95 % -- --   01/10/23 0040 108/71 -- -- 75 13 96 % -- --   01/10/23 0000 118/71 -- -- 77 15 97 % -- --       Last Body weight:   Wt Readings from Last 3 Encounters:   01/10/23 119 lb 11.2 oz (54.3 kg)   22 124 lb 6.4 oz (56.4 kg)   22 126 lb 12.8 oz (57.5 kg)       Body Mass Index : Body mass index is 21.2 kg/m². Tmax over 24 hours: Temp (24hrs), Av.9 °F (37.2 °C), Min:98.7 °F (37.1 °C), Max:99 °F (37.2 °C)      Ins/Outs:   No intake/output data recorded.     PHYSICAL EXAM:  Constitutional: Awake and alert, well developed and well nourished, in no acute distress  EENT: PERRLA, EOMI, sclera clear, anicteric, oropharynx clear, no lesions  Neck: Supple, symmetrical, trachea midline  Respiratory: clear to auscultation, no wheezes, rales, or rhonchi  Cardiovascular: regular rate and rhythm, normal S1, S2, no murmur noted and 2+ distal pulses throughout  Abdomen: soft, nontender, nondistended, no masses or organomegaly  Extremities:  peripheral pulses normal, no pedal edema, no clubbing or cyanosis right ankle in posterior splint, able to wiggle toes, good cap refill    MEDICATIONS:  Scheduled Meds:   aspirin EC  81 mg Oral Daily    levothyroxine  75 mcg Oral Daily    sodium chloride flush  5-40 mL IntraVENous 2 times per day    enoxaparin  40 mg SubCUTAneous Daily       Continuous Infusions:   sodium chloride      sodium chloride 30 mL/hr (01/10/23 0259)       PRN Meds:   sodium chloride flush, 5-40 mL, PRN  sodium chloride, , PRN  ondansetron, 4 mg, Q8H PRN   Or  ondansetron, 4 mg, Q6H PRN  polyethylene glycol, 17 g, Daily PRN  acetaminophen, 650 mg, Q6H PRN   Or  acetaminophen, 650 mg, Q6H PRN    DATA:  Complete Blood Count:   Recent Labs     01/09/23 2024   WBC 11.1   RBC 3.80*   HGB 11.3*   HCT 31.9*   MCV 83.9   MCH 29.7   MCHC 35.4*   RDW 13.0   PLT See Reflexed IPF Result        Last 3 Blood Glucose:   Recent Labs     01/09/23 2024   GLUCOSE 98        PT/INR:    Lab Results   Component Value Date/Time    PROTIME 10.8 01/09/2023 08:24 PM    INR 1.0 01/09/2023 08:24 PM     PTT:  No results found for: APTT, PTT    Basic Metabolic Profile:   Recent Labs     01/09/23 2008 01/09/23 2024 01/09/23 2024 01/09/23  2300 01/10/23  0102 01/10/23  0302   NA  --  116*   < > 117* 114* 113*   K  --  3.5*  --   --   --   --    CL  --  82*  --   --   --   --    CO2  --  17*  --   --   --   --    BUN  --  9  --   --   --   --    CREATININE 0.76 0.71  --   --   --   --    GLUCOSE  --  98  --   --   --   --    PHOS  --  2.5*  --   --   --   --     < > = values in this interval not displayed.        Liver Function:  Recent Labs     01/09/23 2024   PROT 7.3   LABALBU 3.8   ALT 22   AST 41*   ALKPHOS 82   BILITOT 0.5       Magnesium:   Lab Results   Component Value Date/Time    MG 1.5 01/10/2023 03:02 AM    MG 1.7 01/09/2023 08:24 PM     Phosphorus:   Lab Results   Component Value Date/Time    PHOS 2.5 01/09/2023 08:24 PM     Ionized Calcium: No results found for: CAION     Urinalysis:   Lab Results   Component Value Date/Time    NITRU NEGATIVE 01/09/2023 08:31 PM    COLORU Yellow 01/09/2023 08:31 PM    PHUR 7.5 01/09/2023 08:31 PM    WBCUA None 01/09/2023 08:31 PM    RBCUA 50  01/09/2023 08:31 PM    CLARITYU clear 09/21/2022 10:13 AM    SPECGRAV 1.042 01/09/2023 08:31 PM    LEUKOCYTESUR NEGATIVE 01/09/2023 08:31 PM    UROBILINOGEN Normal 01/09/2023 08:31 PM BILIRUBINUR NEGATIVE 2023 08:31 PM    BILIRUBINUR neg 2022 10:13 AM    BLOODU moderate** 2022 10:13 AM    GLUCOSEU NEGATIVE 2023 08:31 PM    [address] [place] 2023 08:31 PM       HgBA1c:  No results found for: LABA1C  TSH:    Lab Results   Component Value Date/Time    TSH 7.06 2023 08:24 PM     Lactic Acid: No results found for: LACTA   Troponin: No results for input(s): TROPONINI in the last 72 hours. 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    Vitamin D deficiency 10/08/2014    Arthritis 10/08/2014    CAD (coronary artery disease) 10/08/2014        PLAN:     PLAN/MEDICAL DECISION MAKING:  Neurologic:   Neuro checks per protocol  Pain management: percocet 5/325 q4h prn  Left sided weakness and decreased sensation  Neuro following for stroke w/u  ICA aneurysm  MRI pending  EEG pending  Cardiovascular:  HR: 70's  MAP goal >65  Pulmonary:  Maintain oxygen sats >92%  Pulmonary toilet  CXR: none for review  GI/Nutrition  Bowel regimen: miralax prn  Ulcer Prophylaxis:     not indicated  Diet:Diet NPO  Last BM: none recored  Renal/Fluid/Electrolyte  IV Fluids:  3% saline  @  30  mL/Hr   I/O: No intake/output data recorded.   UOP: strict I/O  BUN/Cr: 9/0.71  Monitor electrolytes, replace PRN   Nephro following for hyponatremia  ID  WBC:   Lab Results   Component Value Date    WBC 11.1 2023     Tmax: Temp (24hrs), Av.9 °F (37.2 °C), Min:98.7 °F (37.1 °C), Max:99 °F (37.2 °C)    Antimicrobials: none, monitor for signs/sx of infection  Hematology:  H/H: 11. 3/31.9  Endocrine:   Glucose <200  TSH elevated  T4 pending  F/u AM cortisol levels  DVT Prophylaxis  EPC Cuffs  Lovenox ppx    Discharge Needs:  PT, OT, ST, SW, and Case Management      CODE STATUS: Full Code     DISPOSITION:  [x] To remain ICU:   [] OK for out of ICU from Critical Care standpoint    ---  Moshe Gonzales DO, [address]  Emergency Medicine Resident  [facility]  01/10/23 7:16 AM     Attending Physician Statement  I have discussed the care of Pierre Baer, including pertinent history and exam findings with the resident. I have reviewed the key elements of all parts of the encounter with the resident. I have seen and examined the patient with the resident. I agree with the assessment and plan and status of the problem list as documented. I seen the patient during around this morning, chart reviewed, labs seen previous chest x-ray and CT scan chest seen. Patient had a fall recently apparently around [place] and she had fractured her right ankle and had fixation done and on extended care facility. She started having left arm weakness that she could not move apparently with numbness and possible altered mental status patient was taken to emergency room in [facility] where apparently symptoms improved she had a CT head done which was negative. Sodium was found to be 116. For management of hyponatremia she was transferred to ICU here. She does have history of hypothyroidism and apparently take her medication. She had history of DVT 15 years ago she is not on chronic anticoagulation. She did have history of smoking and stop smoking years ago. According to patient she was never told that she has COPD. CTA head was done in [facility] which shows right ICA aneurysm 8 mm patient was seen by neurology and currently no recommendation for intervention per neurology but MRI and EEG was ordered.   I have reviewed the CT scan of the chest from 2006 apparently patient was seen by pulmonary at that time and she was told that she has abnormal spot and she had repeat CT scans and it was stable. Sodium is gradually increasing and she was started on 3% saline per nephrology. Sodium is 122 this morning and waiting for next sodium and to avoid rapid correction of sodium 3% as he stopped already and she is currently on fluid restriction. Follow-up MRI and EEG and follow-up with neurology. Follow-up sodium every 2 hours and then later changed to 4 hours depending upon the correction. Continue with Synthroid her TSH was 7. Repeat chest x-ray as x-ray which was done first not able to see the left apex because of flexion of the neck. Discussed with nursing staff, treatment and plan discussed. Total critical care time caring for this patient with life threatening, unstable organ failure, including direct patient contact, management of life support systems, review of data including imaging and labs, discussions with other team members and physicians at least 39  Min so far today, excluding procedures. Please note that this chart was generated using voice recognition Dragon dictation software. Although every effort was made to ensure the accuracy of this automated transcription, some errors in transcription may have occurred.       Jene Simmonds, MD  1/10/2023 11:50 AM

## 2023-01-10 NOTE — PLAN OF CARE
Problem: Pain  Goal: Verbalizes/displays adequate comfort level or baseline comfort level  Outcome: Progressing     Problem: Confusion  Goal: Confusion, delirium, dementia, or psychosis is improved or at baseline  Description: INTERVENTIONS:  1. Assess for possible contributors to thought disturbance, including medications, impaired vision or hearing, underlying metabolic abnormalities, dehydration, psychiatric diagnoses, and notify attending LIP  2. Brick high risk fall precautions, as indicated  3. Provide frequent short contacts to provide reality reorientation, refocusing and direction  4. Decrease environmental stimuli, including noise as appropriate  5. Monitor and intervene to maintain adequate nutrition, hydration, elimination, sleep and activity  6. If unable to ensure safety without constant attention obtain sitter and review sitter guidelines with assigned personnel  7.  Initiate Psychosocial CNS and Spiritual Care consult, as indicated  Outcome: Progressing

## 2023-01-10 NOTE — PROCEDURES
EEG REPORT       Patient: Eve Sever Age: [de-identified] y.o. MRN: [id number]      Referring Provider: No ref. provider found    History: This routine 30 minute scalp EEG was recorded with video- monitoring for a [de-identified] y.o. Kym Olivares female who presented with general weakness and lethargy with left sided weakness. There's a concern for possible seizure event. This EEG was performed to evaluate for focal and epileptiform abnormalities. Clyde Locke   Current Facility-Administered Medications   Medication Dose Route Frequency Provider Last Rate Last Admin    aspirin EC tablet 81 mg  81 mg Oral Daily Oral Philip MD   81 mg at 01/10/23 6789    levothyroxine (SYNTHROID) tablet 75 mcg  75 mcg Oral Daily Oral Philip MD   75 mcg at 01/10/23 9098    sodium chloride flush 0.9 % injection 5-40 mL  5-40 mL IntraVENous 2 times per day Oral Philip MD   10 mL at 01/10/23 0837    sodium chloride flush 0.9 % injection 5-40 mL  5-40 mL IntraVENous PRN Oral Philip MD   10 mL at 01/10/23 0836    0.9 % sodium chloride infusion   IntraVENous PRN Oral Philip MD        enoxaparin (LOVENOX) injection 40 mg  40 mg SubCUTAneous Daily Oral Philip MD   40 mg at 01/10/23 0828    ondansetron (ZOFRAN-ODT) disintegrating tablet 4 mg  4 mg Oral Q8H PRN Oral Philip MD        Or    ondansetron [facility]) injection 4 mg  4 mg IntraVENous Q6H PRN Oral Philip MD   4 mg at 01/10/23 0552    polyethylene glycol (GLYCOLAX) packet 17 g  17 g Oral Daily PRN Oral Philip MD        acetaminophen (TYLENOL) tablet 650 mg  650 mg Oral Q6H PRN Oral Philip MD   650 mg at 01/10/23 1141    Or    acetaminophen (TYLENOL) suppository 650 mg  650 mg Rectal Q6H PRN Oral Philip MD        oxyCODONE-acetaminophen (PERCOCET) 5-325 MG per tablet 1 tablet  1 tablet Oral Q6H PRN Inga Durbin DO           Technical Description: This is a 21 channel digital EEG recording with time-locked video.  Electrodes were placed in accordance with the 10-20 International System of Electrode Placement. Single lead EKG monitoring as well as temporal electrodes were included. EEG Description: The dominant background activity during maximal recorded wakefulness consisted of 4-5 Hz of polymorphic delta and theta activity of 25 to 35 µV. The background was symmetric and continuous. There was poor anterior posterior amplitude gradient. Sleep architecture was not seen. Activation procedures were not performed. The EKG channel demonstrated a normal sinus rhythm. Interpretation  This EEG was abnormal due to disorganized and slow background in polymorphic delta and theta frequency. Clinical correlation  This EEG was abnormal. Disorganized and slow background suggested moderate to severe encephalopathy of non specific etiology. Marielena Agudelo MD  2:41 PM  01/10/23    I have personally reviewed the EEG of Zee Herman. I agree with the documentation by the resident with changes made to the note as needed. Claudia Sorto MD  Neurology    This note is created with the assistance of a speech-recognition program. While intending to generate a document that actually reflects the content of the visit, the document can still have some errors including those of syntax and sound a- like substitutions which may escape proofreading. In such instances, actual meaning can be extrapolated by contextual derivation.

## 2023-01-10 NOTE — CONSULTS
Nephrology Consult Note    Reason for Consult:  Hyponatremia  Requesting Physician:  Serge Mancini    Chief Complaint:  Right ankle pain following fracture  History Obtained From:  patient, electronic medical record    History of Present Illness: This is a [de-identified] y.o. female who presents with severe weakness along with pain in her right ankle. She had labs drawn at an outside emergency room at [facility] showing sodium of 116 and a hemoglobin of 11.5. Serum creatinine was essentially normal of 0.8. Those labs were drawn at about 12:30 PM earlier in the day on 1/9/2023. Chest x-ray was within normal limits. The patient was experiencing significant weakness and so she was transferred to [place] [address]. She apparently also had some altered mentation as well. Repeat labs drawn at 8:24 PM showed sodium 116 stable from earlier in the day, potassium 3.5 chloride 82 and CO2 17 with BUN 9 and creatinine 0.71. I was called with the set of labs showing a persistently low serum sodium level. With the patient's symptomatic, I did order 3% saline infusion at 25 mL an hour and for sodium levels to be checked every 2 hours and those results passed on to me. Lab data from 11 PM showed sodium up a bit to 117. The patient is weak and tired but does awaken to voice. She does answer questions slowly but appropriately. She does state she takes antihypertensive medications but denies ever being on hydrochlorothiazide. She denies excessive thirst.  She is actually says she's been eating and drinking less lately. Her pain in over her right ankle is persistent. She denies any chest pain or shortness of breath or nausea or vomiting. Medications at home include vitamin C, multivitamin, thyroid replacement, baby aspirin, calcium and tramadol. Allergies are reviewed.      Past medical history includes arthritis, CAD, edema which is not present in significant amount today, blood in the area and, hypotension, hypercholesterolemia, hypothyroidism and vitamin D deficiency.  She has had intracoronary stent placed, cataract removal with lens implant and colonoscopy.     Socially, patient states she lives with her son.  She is .  She has 2 children.  She is a retired .  She is a former cigarette smoker, quitting in  with documented about 15 pack years.  There is no regular alcohol use to my knowledge.    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 Boston        Current Medications:    sodium chloride 3 % solution, Continuous        Allergies:  Doxycycline, Pcn [penicillins], Pneumococcal vaccines, and Tetanus toxoids    Social History:   Social History     Socioeconomic History    Marital status:      Spouse name: Not on file    Number of children: 2    Years of education: Not on file    Highest education level: Not on file   Occupational History    Occupation:    Tobacco Use    Smoking status: Former     Packs/day: 0.50     Years: 11.00     Pack years: 5.50     Types: Cigarettes     Quit date: 3/26/2008     Years since quittin.8    Smokeless tobacco: Never   Substance and Sexual Activity    Alcohol use: No    Drug use: No    Sexual activity: Not Currently   Other Topics Concern    Not on file   Social History Narrative    Not on file     Social Determinants of Health     Financial Resource Strain: Low Risk      Difficulty of Paying Living Expenses: Not hard at all   Food Insecurity: No Food Insecurity    Worried About Running Out of Food in the Last Year: Never true    Ran Out of Food in the Last Year: Never true   Transportation Needs: Not on file   Physical Activity: Inactive    Days of Exercise per Week: 0 days    Minutes of Exercise per Session: 0 min   Stress: Not on file   Social Connections: Not on file   Intimate Partner Violence: Not on file   Housing Stability: Not on file       Family History:   Family History   Adopted: Yes       Review of Systems:    The patient continued to Limited review of systems before drifting back to sleep as documented in the history of present illness    Objective:  CURRENT TEMPERATURE:  Temp: 98.7 °F (37.1 °C)  MAXIMUM TEMPERATURE OVER 24HRS:  Temp (24hrs), Av.7 °F (37.1 °C), Min:98.7 °F (37.1 °C), Max:98.7 °F (37.1 °C)    CURRENT RESPIRATORY RATE:  Resp: 13  CURRENT PULSE:  Heart Rate: 76  CURRENT BLOOD PRESSURE:  BP: 110/66  24HR BLOOD PRESSURE RANGE:  Systolic (02MTE), OPL:336 , Min:107 , FELICIA:823   ; Diastolic (08JIQ), POF:91, Min:62, Max:75    24HR INTAKE/OUTPUT:  No intake or output data in the 24 hours ending 01/10/23 0230    Physical Exam:  General appearance:sleepy but does awaken to voice and answers basic questions  Skin: warm and dry, no rash or erythema  Eyes: conjunctivae normal and sclera anicteric  ENT: :no thrush, mildly dry mucous membranes   Neck: no JVD, midline trachea no accessory muscle use   Pulmonary: clear to auscultation bilaterally- no wheezes, rales or rhonchi, normal air movement, no respiratory distress  Cardiovascular: Regular rate positive S1 and S2 and no rubs  Abdomen: soft, non-tender, non-distended, normal bowel sounds, no masses or organomegaly and soft nontender, bowel sounds present, no organomegaly,  no ascites  Extremities: right lower leg and foot dressed related to an ankle fracture with no edema or cyanosis over the left distal lower extremity and foot    Labs:   CBC:   Recent Labs     01/09/23 2024   WBC 11.1   RBC 3.80*   HGB 11.3*   HCT 31.9*   MCV 83.9   MCH 29.7   MCHC 35.4*   RDW 13.0   PLT See Reflexed IPF Result      BMP:   Recent Labs     01/09/23 2008 01/09/23 2024 01/09/23  2300   NA  --  116* 117*   K  --  3.5*  --    CL  --  82*  --    CO2  --  17*  --    BUN  --  9  --    CREATININE 0.76 0.71  --    GLUCOSE  --  98  --    CALCIUM  --  8.8  --         Phosphorus:    Recent Labs     01/09/23 2024   PHOS 2.5*     Magnesium:   Recent Labs     01/09/23 2024   MG 1.7     Albumin:   Recent Labs     01/09/23 2024   LABALBU 3.8         SPEP:   Lab Results   Component Value Date/Time    PROT 7.3 01/09/2023 08:24 PM     UPEP: No results found for: TPU     C3: No results found for: C3  C4: No results found for: C4  MPO ANCA:  No results found for: MPO .   PR3 ANCA:  No results found for: PR3  Urine Sodium:    Lab Results   Component Value Date/Time    ZAHIRA 114 01/09/2023 08:31 PM      Urine Potassium:  No results found for: KUR  Urine Chloride:  No results found for: CLU  Urine Ph:  No components found for: PO4U  Urine Osmolarity:    Lab Results   Component Value Date/Time    OSMOU 442 01/09/2023 08:31 PM     Urine Creatinine:  No results found for: LABCREA  Urine Eosinophils: No components found for: EOSU  Urine Protein:  No results found for: TPU  Urinalysis:  U/A:   Lab Results   Component Value Date/Time    NITRU NEGATIVE 01/09/2023 08:31 PM    COLORU Yellow 01/09/2023 08:31 PM    PHUR 7.5 01/09/2023 08:31 PM    WBCUA None 01/09/2023 08:31 PM    RBCUA 50  01/09/2023 08:31 PM    CLARITYU clear 09/21/2022 10:13 AM    SPECGRAV 1.042 01/09/2023 08:31 PM    LEUKOCYTESUR NEGATIVE 01/09/2023 08:31 PM    UROBILINOGEN Normal 01/09/2023 08:31 PM    BILIRUBINUR NEGATIVE 01/09/2023 08:31 PM    BILIRUBINUR neg 09/21/2022 10:13 AM    BLOODU moderate** 09/21/2022 10:13 AM    GLUCOSEU NEGATIVE 01/09/2023 08:31 PM    KETUA MODERATE 01/09/2023 08:31 PM         Radiology:  Reviewed as available. Assessment:  1. Symptomatic hyponatremia  2. 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 Low oral intake may be a contributor as well. 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 No significant fluid overload   4. Anemia of chronic disease       Plan:  1. Continue with 3% saline infusion IV as ordered     2. Frequent checks of serum sodium with results to be called to my  3. Urine results reviewed  4. Goal is to get the serum sodium to about 120 mmol per liter before stopping the 3% saline infusion  5. Optimize pain control for the patient's recent ankle fracture    Thank you for the consultation. Please do not hesitate to call with questions.     Electronically signed by Renetta Mahan MD on 1/10/2023 at 2:30 AM

## 2023-01-10 NOTE — ED PROVIDER NOTES
[facility] ED     Emergency Department     Faculty Attestation        I performed a history and physical examination of the patient and discussed management with the resident. I reviewed the residents note and agree with the documented findings and plan of care. Any areas of disagreement are noted on the chart. I was personally present for the key portions of any procedures. I have documented in the chart those procedures where I was not present during the key portions. I have reviewed the emergency nurses triage note. I agree with the chief complaint, past medical history, past surgical history, allergies, medications, social and family history as documented unless otherwise noted below. For Physician Assistant/ Nurse Practitioner cases/documentation I have personally evaluated this patient and have completed at least one if not all key elements of the E/M (history, physical exam, and MDM). Additional findings are as noted. PCP:  Stanislav Carpio MD    Pertinent Comments:     Patient is an 43-year-old female from rehab facility in [facility] emergency room with altered mental status and possible left-sided deficit since 9:30 AM this morning which was almost 12 hours ago. Patient there had CT head negative for bleed however CTA head and neck was positive for aneurysm not bleeding/leaking. Unfortunately, patient was also found to have a sodium of 116 which may be explaining her symptoms as well. Patient was accepted by Dr. Hieu Kinsey from critical care but no bed was available so came to the emergency room. ICU be contacted immediately for admission.    In the ER we will be available for any acute changes in status        EKG Interpretation    Interpreted by emergency department physician    Rhythm: normal sinus   Rate: normal at 85 bpm  Axis: normal  Conduction: normal  ST Segments: no acute change  T Waves: no acute change  Q Waves: no acute change    Clinical Impression:  nonspecific EKG. (Please note that portions of this note were completed with a voice recognition program. Efforts were made to edit the dictations but occasionally words are mis-transcribed.  Whenever words are used in this note in any gender, they shall be construed as though they were used in the gender appropriate to the circumstances; and whenever words are used in this note in the singular or plural form, they shall be construed as though they were used in the form appropriate to the circumstances.)    Leslie Canela MD [facility]  Attending Emergency Medicine Physician            Masha Hartley MD  01/09/23 2008       Masha Hartley MD  01/09/23 2027

## 2023-01-10 NOTE — ED NOTES
Per medical documentation pt had altered mental status so SW was unable to complete ACP documentation.       ADITHYA Lawrence  01/10/23 0323

## 2023-01-10 NOTE — PROGRESS NOTES
[facility] [address]  PROGRESS NOTE    Shift date: 01/09/2023  Shift day: Monday   Shift # 2    Room # 25/25   Name: Brant Linares                  Referral:  ED stroke consult    Admit Date & Time: 1/9/2023  8:01 PM    Assessment:  Brant Linares is a [de-identified] y.o. female in the hospital. Upon entering the room writer observes the patient lying in bed appearing asleep. The patient appears to wake at the sound of the . The patient's attention appears to be on the medical equipment and the patient appears to be going in and out of sleep during the visit. Intervention:  Writer introduced self and title as  Writer offered space for the patient  to express feelings, needs, and concerns and provided a ministry presence. Outcome:  Patient expressed gratitude and welcomes visit at another time. Plan:  Chaplains will remain available to offer spiritual and emotional support as needed. 01/09/23 2155   Encounter Summary   Service Provided For: Patient   Referral/Consult From: Multi-disciplinary team   Support System Children   Last Encounter  01/09/23   Complexity of Encounter Low   Begin Time 2150   End Time  2155   Total Time Calculated 5 min   Assessment/Intervention/Outcome   Assessment Unable to assess; Other (Comment)  (pt. appears extremely sleepy)   Intervention Sustaining Presence/Ministry of presence   Outcome Expressed Gratitude       Electronically signed by Vanesa Pruitt Resident, on 1/9/2023 at 9:56 PM.  Gildardo Paz  [phone]

## 2023-01-10 NOTE — PROGRESS NOTES
History of recurrent UTIs, has chronic indwelling graves catheter changed by home health nurse once per month.    Prior urine cultures with Pseudomonas, Proteus, Klebsiella.  Port of entry for gram negative sepsis from here   History reviewed, was seen by our service overnight for symptomatic hyponatremia with, sodium was 116 on presentation. Patient presented with flaccid paralysis of the left upper extremity which was transient and then recovered on admission. She has been recovering at home after an ORIF. CT scan of the head noted facility showed left ICA aneurysm. 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 Sodium this morning is up to 122 which is a correction of 6 in 12 hours. Plan  1. Discontinue 3% saline  2. High-protein high-calorie diet  3. Replace potassium  4. Check sodium every 2 hours  5. Patient may need hypotonic fluids if sodium correction continues to be rapid  6.   We will follow

## 2023-01-10 NOTE — H&P
Critical Care - History and Physical Examination    Patient's name:  Susu Locke  Medical Record Number: [id number]  Patient's account/billing number: [id number]  Patient's YOB: 1940  Age: 82 y.o.  Date of Admission: 1/9/2023  8:01 PM  Date of History and Physical Examination: 1/10/2023      Primary Care Physician: Horacio Landon MD  Attending Physician: Dr. Guerra     Code Status: Full Code    Chief complaint:   Chief Complaint   Patient presents with    Extremity Weakness    Altered Mental Status         HISTORY OF PRESENT ILLNESS:      History was obtained from chart review.      Susu Locke is a 82 y.o. with PMH of hypothyroidism, hypercholesterolemia, vitamin D deficiency, CAD, osteopenia, history of DVT not on any anticoagulation at home and CKD stage III.  Patient presented altered mental status, concern for TIA due to weakness and hyponatremia.  Per ED patient is rehabbing at a facility for recent ORIF of the left ankle on 12/28.  Per patient she states she gets rehab at home.  According to ED resident patient was at rehab at 10:30 AM and had an episode of flaccid paralysis of the left upper extremity and reduced sensation.  Symptoms had resolved when patient arrived to the emergency department.  CT at outlying facility showed left ICA aneurysm.  Evaluated by neurology as a stroke alert.  Neurology stated the aneurysm would not cause her left-sided symptoms.    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 Ervin Chambers:      Allergies   Allergen Reactions    Doxycycline Other (See Comments)     GI upset. Pcn [Penicillins] Hives    Pneumococcal Vaccines Hives     lg local reaction    Tetanus Toxoids          HOME MEDS: :      Prior to Admission medications    Medication Sig Start Date End Date Taking? Authorizing Provider   vitamin C (ASCORBIC ACID) 500 MG tablet Take 500 mg by mouth daily    Historical Provider, MD   Multiple Vitamins-Minerals (THERAPEUTIC MULTIVITAMIN-MINERALS) tablet Take 1 tablet by mouth daily    Historical Provider, MD   levothyroxine (SYNTHROID) 75 MCG tablet Take 1 tablet by mouth Daily 10/11/22   Jesus Alberto Tyson MD   aspirin EC 81 MG EC tablet Take 1 tablet by mouth daily 6/1/21   Jesus Alberto Tyson MD   Calcium Carb-Cholecalciferol 600-800 MG-UNIT TABS Take 1 tablet by mouth daily    Historical Provider, MD   traMADol (ULTRAM) 50 MG tablet Take 50 mg by mouth 2 times daily as needed. 9/29/14   Historical Provider, MD       SOCIAL HISTORY:       TOBACCO:   reports that she quit smoking about 14 years ago. Her smoking use included cigarettes. She has a 5.50 pack-year smoking history. She has never used smokeless tobacco.  ETOH:   reports no history of alcohol use. DRUGS:  reports no history of drug use.      FAMILY HISTORY:          Adopted: Yes       REVIEW OF SYSTEMS (ROS):     Review of Systems -   General ROS: negative  Psychological ROS: negative  Ophthalmic ROS: negative  ENT ROS: negative  Allergy and Immunology ROS: negative  Hematological and Lymphatic ROS: negative  Endocrine ROS: negative  Breast ROS: negative  Respiratory ROS: no cough, shortness of breath, or wheezing  Cardiovascular ROS: no chest pain or dyspnea on exertion  Gastrointestinal ROS:negative  Genito-Urinary ROS: negative  Musculoskeletal ROS: negative  Neurological ROS: negative  Dermatological ROS: negative    OBJECTIVE:     VITAL SIGNS:  BP 92/66   Pulse 83   Temp 98.7 °F (37.1 °C) (Oral)   Resp 16   Wt 124 lb (56.2 kg)   SpO2 96%   BMI 22.68 kg/m²   Tmax over 24 hours:  Temp (24hrs), Av.7 °F (37.1 °C), Min:98.7 °F (37.1 °C), Max:98.7 °F (37.1 °C)      Patient Vitals for the past 8 hrs:   BP Temp Temp src Pulse Resp SpO2 Weight   01/10/23 0340 92/66 -- -- 83 16 96 % --   01/10/23 0235 100/65 -- -- 81 16 96 % --   01/10/23 0210 110/66 -- -- 76 13 95 % --   01/10/23 0040 108/71 -- -- 75 13 96 % --   01/10/23 0000 118/71 -- -- 77 15 97 % --   23 2305 108/71 -- -- 92 17 96 % --   23 2300 107/64 -- -- 74 12 94 % --   23 2110 113/62 -- -- 72 15 96 % --   23 119/64 -- -- 78 16 96 % --   23 118/75 98.7 °F (37.1 °C) Oral 84 19 98 % 124 lb (56.2 kg)       No intake or output data in the 24 hours ending 01/10/23 0352    Wt Readings from Last 3 Encounters:   23 124 lb (56.2 kg)   22 124 lb 6.4 oz (56.4 kg)   22 126 lb 12.8 oz (57.5 kg)     Body mass index is 22.68 kg/m². PHYSICAL EXAM:  Constitutional: Appears well, no distress, appears sleepy  EENT: neck supple with midline trachea. Neck: Supple, symmetrical, trachea midline, no adenopathy,  no jvd, skin normal  Respiratory: clear to auscultation, no wheezes or rales and unlabored breathing.  No intercostal tenderness  Cardiovascular: regular rate and rhythm, normal S1, S2, no murmur noted  Abdomen: soft, nontender, nondistended, no masses or organomegaly  Extremities:   no pedal edema, no clubbing or cyanosis    MEDICATIONS:  Scheduled Meds:   aspirin EC  81 mg Oral Daily    levothyroxine  75 mcg Oral Daily    sodium chloride flush  5-40 mL IntraVENous 2 times per day enoxaparin  40 mg SubCUTAneous Daily     Continuous Infusions:   sodium chloride      sodium chloride 30 mL/hr (01/10/23 0259)     PRN Meds:   sodium chloride flush, 5-40 mL, PRN  sodium chloride, , PRN  ondansetron, 4 mg, Q8H PRN   Or  ondansetron, 4 mg, Q6H PRN  polyethylene glycol, 17 g, Daily PRN  acetaminophen, 650 mg, Q6H PRN   Or  acetaminophen, 650 mg, Q6H PRN        ABGs:   No results found for: PHART, PH, JBB0CEB, PCO2, PO2ART, PO2, BTY0RYQ, HCO3, BEART, BE, THGBART, THB, ACX3TQF, X2ZBBPXM, O2SAT, FIO2    DATA:  Complete Blood Count:   Recent Labs     01/09/23 2024   WBC 11.1   RBC 3.80*   HGB 11.3*   HCT 31.9*   MCV 83.9   MCH 29.7   MCHC 35.4*   RDW 13.0   PLT See Reflexed IPF Result        Last 3 Blood Glucose:   Recent Labs     01/09/23 2024   GLUCOSE 98        PT/INR:    Lab Results   Component Value Date/Time    PROTIME 10.8 01/09/2023 08:24 PM    INR 1.0 01/09/2023 08:24 PM     PTT:  No results found for: APTT, PTT    Comprehensive Metabolic Profile:   Recent Labs     01/09/23 2008 01/09/23 2024 01/09/23 2024 01/09/23  2300 01/10/23  0102 01/10/23  0302   NA  --  116*   < > 117* 114* 113*   K  --  3.5*  --   --   --   --    CL  --  82*  --   --   --   --    CO2  --  17*  --   --   --   --    BUN  --  9  --   --   --   --    CREATININE 0.76 0.71  --   --   --   --    GLUCOSE  --  98  --   --   --   --    CALCIUM  --  8.8  --   --   --   --    PROT  --  7.3  --   --   --   --    LABALBU  --  3.8  --   --   --   --    BILITOT  --  0.5  --   --   --   --    ALKPHOS  --  82  --   --   --   --    AST  --  41*  --   --   --   --    ALT  --  22  --   --   --   --     < > = values in this interval not displayed.       Magnesium:   Lab Results   Component Value Date/Time    MG 1.7 01/09/2023 08:24 PM     Phosphorus:   Lab Results   Component Value Date/Time    PHOS 2.5 01/09/2023 08:24 PM     Ionized Calcium: No results found for: CAION     Urinalysis:   Lab Results   Component Value Date/Time    NITRU NEGATIVE 01/09/2023 08:31 PM    COLORU Yellow 01/09/2023 08:31 PM    PHUR 7.5 01/09/2023 08:31 PM    WBCUA None 01/09/2023 08:31 PM    RBCUA 50  01/09/2023 08:31 PM    CLARITYU clear 09/21/2022 10:13 AM    SPECGRAV 1.042 01/09/2023 08:31 PM    LEUKOCYTESUR NEGATIVE 01/09/2023 08:31 PM    UROBILINOGEN Normal 01/09/2023 08:31 PM    BILIRUBINUR NEGATIVE 01/09/2023 08:31 PM    BILIRUBINUR neg 09/21/2022 10:13 AM    BLOODU moderate** 09/21/2022 10:13 AM    GLUCOSEU NEGATIVE 01/09/2023 08:31 PM    [address] [facility] [place] 01/09/2023 08:31 PM       HgBA1c:  No results found for: LABA1C  TSH:    Lab Results   Component Value Date/Time    TSH 7.06 01/09/2023 08:24 PM       Lactic Acid: No results found for: LACTA   Troponin: No results for input(s): TROPONINI in the last 72 hours. Radiological imaging  MRI brain pending    ASSESSMENT:     Principal Problem:    Hyponatremia  Active Problems:    Hypothyroidism    Pure hypercholesterolemia    Vitamin D deficiency    Arthritis    CAD (coronary artery disease)    Osteopenia    History of DVT (deep vein thrombosis)    Chronic renal failure, stage 3b (HCC)  Resolved Problems:    * No resolved hospital problems. *      PLAN:     Nephrology following for hyponatremia. Sodium checks every 2 hours   Continue 3% saline  Follow-up EEG  Strict I's and O's  Seizure precautions  Replace potassium as needed  Continue aspirin 81 mg  Continue Synthroid 75 mcg  DVT prophylaxis with Lovenox      Fidelina Dotson MD  Critical care resident   [facility]  1/10/2023 3:52 AM    The critical care team assigned to the patient will be following up the patient in the intensive care unit. I have discussed the current plan with the critical care attending. The above mentioned assessment and plan will be reviewed again in detail by the critical care attending at bedside, and can be further changed or modified accordingly by the attending physician.          Attending Physician Statement  I have discussed the care of Ivett Jones, including pertinent history and exam findings with the resident. I have reviewed the key elements of all parts of the encounter with the resident. I have seen and examined the patient with the resident. I agree with the assessment and plan and status of the problem list as documented. I seen the patient during around this morning, chart reviewed, labs seen previous chest x-ray and CT scan chest seen. Patient had a fall recently apparently around [place] and she had fractured her right ankle and had fixation done and on extended care facility. She started having left arm weakness that she could not move apparently with numbness and possible altered mental status patient was taken to emergency room in [facility] where apparently symptoms improved she had a CT head done which was negative. Sodium was found to be 116. For management of hyponatremia she was transferred to ICU here. She does have history of hypothyroidism and apparently take her medication. She had history of DVT 15 years ago she is not on chronic anticoagulation. She did have history of smoking and stop smoking years ago. According to patient she was never told that she has COPD. CTA head was done in [facility] which shows right ICA aneurysm 8 mm patient was seen by neurology and currently no recommendation for intervention per neurology but MRI and EEG was ordered. I have reviewed the CT scan of the chest from 2006 apparently patient was seen by pulmonary at that time and she was told that she has abnormal spot and she had repeat CT scans and it was stable. Sodium is gradually increasing and she was started on 3% saline per nephrology. Sodium is 122 this morning and waiting for next sodium and to avoid rapid correction of sodium 3% as he stopped already and she is currently on fluid restriction. Follow-up MRI and EEG and follow-up with neurology.   Follow-up sodium every 2 hours and then later changed to 4 hours depending upon the correction. Continue with Synthroid her TSH was 7. Repeat chest x-ray as x-ray which was done first not able to see the left apex because of flexion of the neck. Discussed with nursing staff, treatment and plan discussed. Total critical care time caring for this patient with life threatening, unstable organ failure, including direct patient contact, management of life support systems, review of data including imaging and labs, discussions with other team members and physicians at least 39  Min so far today, excluding procedures. Please note that this chart was generated using voice recognition Dragon dictation software. Although every effort was made to ensure the accuracy of this automated transcription, some errors in transcription may have occurred.      Hannah Mackenzie MD  1/10/2023 11:50 AM

## 2023-01-10 NOTE — ED PROVIDER NOTES
[facility]  ED  Emergency Department Encounter  EmergencyMedicine Resident     Pt Dionne Nelson  MRN: [id number]  [place] 1940  Date of evaluation: 23  PCP:  Yves Miles MD    CHIEF COMPLAINT       Chief Complaint   Patient presents with    Extremity Weakness    Altered Mental Status       HISTORY OF PRESENT ILLNESS  (Location/Symptom, Timing/Onset, Context/Setting, Quality, Duration, Modifying Factors, Severity.)      Eve Sever is a [de-identified] y.o. female who presents with altered mental status, hyponatremia and concern for TIA today. Patient currently at a rehab facility for ORIF of left ankle on . Patient was at rehab at 10:30 AM had an episode of flaccid paralysis of left upper extremity and reduced sensation. The symptoms had resolved when patient arrived for count emergency department. Work-up was initiated and patient noted to be hyponatremia with a sodium of 116. PAST MEDICAL / SURGICAL / SOCIAL / FAMILY HISTORY      has a past medical history of Arthritis, CAD (coronary artery disease), Edema, Hematuria, Hypotension, unspecified, Pure hypercholesterolemia, Specified congenital anomalies of hair, Unspecified hypothyroidism, and Unspecified vitamin D deficiency. has a past surgical history that includes Cardiac surgery (); Colonoscopy (2008); Cataract removal with implant (Bilateral, 2016); and Colonoscopy (2018). Social History     Socioeconomic History    Marital status:      Spouse name: Not on file    Number of children: 2    Years of education: Not on file    Highest education level: Not on file   Occupational History    Occupation:    Tobacco Use    Smoking status: Former     Packs/day: 0.50     Years: 11.00     Pack years: 5.50     Types: Cigarettes     Quit date: 3/26/2008     Years since quittin.8    Smokeless tobacco: Never   Substance and Sexual Activity    Alcohol use: No    Drug use:  No Sexual activity: Not Currently   Other Topics Concern    Not on file   Social History Narrative    Not on file     Social Determinants of Health     Financial Resource Strain: Low Risk     Difficulty of Paying Living Expenses: Not hard at all   Food Insecurity: No Food Insecurity    Worried About Running Out of Food in the Last Year: Never true    Ran Out of Food in the Last Year: Never true   Transportation Needs: Not on file   Physical Activity: Inactive    Days of Exercise per Week: 0 days    Minutes of Exercise per Session: 0 min   Stress: Not on file   Social Connections: Not on file   Intimate Partner Violence: Not on file   Housing Stability: Not on file       Family History   Adopted: Yes       Allergies:  Doxycycline, Pcn [penicillins], Pneumococcal vaccines, and Tetanus toxoids    Home Medications:  Prior to Admission medications    Medication Sig Start Date End Date Taking? Authorizing Provider   vitamin C (ASCORBIC ACID) 500 MG tablet Take 500 mg by mouth daily    Historical Provider, MD   Multiple Vitamins-Minerals (THERAPEUTIC MULTIVITAMIN-MINERALS) tablet Take 1 tablet by mouth daily    Historical Provider, MD   levothyroxine (SYNTHROID) 75 MCG tablet Take 1 tablet by mouth Daily 10/11/22   Stanley Nj MD   aspirin EC 81 MG EC tablet Take 1 tablet by mouth daily 6/1/21   Stanley Nj MD   Calcium Carb-Cholecalciferol 600-800 MG-UNIT TABS Take 1 tablet by mouth daily    Historical Provider, MD   traMADol (ULTRAM) 50 MG tablet Take 50 mg by mouth 2 times daily as needed.  9/29/14   Historical Provider, MD       REVIEW OF SYSTEMS    (2-9 systems for level 4, 10 or more for level 5)      Review of Systems   Unable to perform ROS: Mental status change     PHYSICAL EXAM   (up to 7 for level 4, 8 or more for level 5)      INITIAL VITALS:   /71   Pulse 92   Temp 98.7 °F (37.1 °C) (Oral)   Resp 17   Wt 124 lb (56.2 kg)   SpO2 96%   BMI 22.68 kg/m²     Physical Exam  Constitutional:       Comments: Appears cachectic and confused   HENT:      Head: Normocephalic and atraumatic. Nose: Nose normal.      Mouth/Throat:      Mouth: Mucous membranes are moist.   Eyes:      Extraocular Movements: Extraocular movements intact. Pupils: Pupils are equal, round, and reactive to light. Cardiovascular:      Rate and Rhythm: Normal rate. Pulses: Normal pulses. Heart sounds: Normal heart sounds. Pulmonary:      Effort: Pulmonary effort is normal.      Breath sounds: Normal breath sounds. Abdominal:      Palpations: Abdomen is soft. Tenderness: There is no abdominal tenderness. Musculoskeletal:      Cervical back: No tenderness. Right lower leg: No edema. Left lower leg: No edema. Comments: Right lower extremity splint in place   Skin:     Capillary Refill: Capillary refill takes less than 2 seconds. Coloration: Skin is not jaundiced. Findings: No bruising. Neurological:      Comments: Patient alert to her self. No facial droop. No drift upper lower extremities bilaterally. Sensation intact upper lower extremities bilaterally. NIH of 1       INITIAL NIH STROKE SCALE    Time Performed:  2010 PM    Administer stroke scale items in the order listed. Record performance in each category after each subscale exam. Do not go back and change scores. Follow directions provided for each exam technique. Scores should reflect what the patient does, not what the clinician thinks the patient can do. The clinician should record answers while administering the exam and work quickly. Except where indicated, the patient should not be coached (i.e., repeated requests to patient to make a special effort). 1a.  Level of consciousness:  0 - alert; keenly responsive  1b. Level of consciousness questions:  1 - answers one question correctly  1c. Level of consciousness questions:  0 - performs both tasks correctly  2.     Best Gaze:  0 - normal  3. Visual:  0 - no visual loss  4. Facial Palsy:  0 - normal symmetric movement  5a. Motor left arm:  0 - no drift, limb holds 90 (or 45) degrees for full 10 seconds  5b. Motor right arm:  0 - no drift, limb holds 90 (or 45) degrees for full 10 seconds  6a. Motor left le - no drift; leg holds 30 degree position for full 5 seconds  6b. Motor right le - no drift; leg holds 30 degree position for full 5 seconds  7. Limb Ataxia:  0 - absent  8. Sensory:  0 - normal; no sensory loss  9. Best Language:  0 - no aphasia, normal  10. Dysarthria:  0 - normal  11.   Extinction and Inattention:  0 - no abnormality    TOTAL:  1      DIFFERENTIAL  DIAGNOSIS     PLAN (LABS / IMAGING / EKG):  Orders Placed This Encounter   Procedures    CENTRAL LINE    MRI BRAIN WO CONTRAST    CBC with Auto Differential    CMP    Lipase    Ammonia    TSH with Reflex    Protime-INR    TOX SCR, BLD, ED    Osmolality    Urinalysis with Reflex to Culture    CHLORIDE, URINE, RANDOM    SODIUM, URINE, RANDOM    OSMOLALITY, URINE    Troponin    Brain Natriuretic Peptide    ELECTROLYTES PLUS    Hemoglobin and hematocrit, blood    CALCIUM, IONIC (POC)    Immature Platelet Fraction    T4, Free    Microscopic Urinalysis    Magnesium    Phosphorus    Sodium Lab    Notify ordering physician while on Hypertonic Saline    Inpatient consult to Neurocritical care    Inpatient consult to Nephrology    Venous Blood Gas, POC    Creatinine W/GFR Point of Care    POCT urea (BUN)    Lactic Acid, POC    POCT Glucose    EKG 12 Lead    EEG awake and drowsy    ADMIT TO INPATIENT    Seizure precautions       MEDICATIONS ORDERED:  Orders Placed This Encounter   Medications    ondansetron (ZOFRAN) injection 4 mg    ondansetron (ZOFRAN) 4 MG/2ML injection     [place] Doctor: cabinet override    morphine injection 2 mg    sodium chloride 3 % solution       DIAGNOSTIC RESULTS / EMERGENCY DEPARTMENT COURSE / MDM   LAB RESULTS:  Results for orders placed or performed during the hospital encounter of 01/09/23   CBC with Auto Differential   Result Value Ref Range    WBC 11.1 3.5 - 11.3 k/uL    RBC 3.80 (L) 3.95 - 5.11 m/uL    Hemoglobin 11.3 (L) 11.9 - 15.1 g/dL    Hematocrit 31.9 (L) 36.3 - 47.1 %    MCV 83.9 82.6 - 102.9 fL    MCH 29.7 25.2 - 33.5 pg    MCHC 35.4 (H) 28.4 - 34.8 g/dL    RDW 13.0 11.8 - 14.4 %    Platelets See Reflexed IPF Result 138 - 453 k/uL    NRBC Automated 0.0 0.0 per 100 WBC    Seg Neutrophils 78 (H) 36 - 65 %    Lymphocytes 9 (L) 24 - 43 %    Monocytes 10 3 - 12 %    Eosinophils % 0 (L) 1 - 4 %    Basophils 0 0 - 2 %    Immature Granulocytes 3 (H) 0 %    Segs Absolute 8.69 (H) 1.50 - 8.10 k/uL    Absolute Lymph # 1.01 (L) 1.10 - 3.70 k/uL    Absolute Mono # 1.10 0.10 - 1.20 k/uL    Absolute Eos # <0.03 0.00 - 0.44 k/uL    Basophils Absolute <0.03 0.00 - 0.20 k/uL    Absolute Immature Granulocyte 0.28 0.00 - 0.30 k/uL   CMP   Result Value Ref Range    Glucose 98 70 - 99 mg/dL    BUN 9 8 - 23 mg/dL    Creatinine 0.71 0.50 - 0.90 mg/dL    Est, Glom Filt Rate >60 >60 mL/min/1.73m2    Calcium 8.8 8.6 - 10.4 mg/dL    Sodium 116 (LL) 135 - 144 mmol/L    Potassium 3.5 (L) 3.7 - 5.3 mmol/L    Chloride 82 (L) 98 - 107 mmol/L    CO2 17 (L) 20 - 31 mmol/L    Anion Gap 17 9 - 17 mmol/L    Alkaline Phosphatase 82 35 - 104 U/L    ALT 22 5 - 33 U/L    AST 41 (H) <32 U/L    Total Bilirubin 0.5 0.3 - 1.2 mg/dL    Total Protein 7.3 6.4 - 8.3 g/dL    Albumin 3.8 3.5 - 5.2 g/dL    Albumin/Globulin Ratio 1.1 1.0 - 2.5   Lipase   Result Value Ref Range    Lipase 51 13 - 60 U/L   Ammonia   Result Value Ref Range    Ammonia 25 11 - 51 umol/L   TSH with Reflex   Result Value Ref Range    TSH 7.06 (H) 0.30 - 5.00 uIU/mL   Protime-INR   Result Value Ref Range    Protime 10.8 9.1 - 12.3 sec    INR 1.0    TOX SCR, BLD, ED   Result Value Ref Range    Acetaminophen Level <5 (L) 10 - 30 ug/mL    Ethanol <10 <10 mg/dL    Ethanol percent <0.010 <0.010 % Salicylate Lvl <1 (L) 3 - 10 mg/dL    Toxic Tricyclic Sc,Blood NEGATIVE NEGATIVE   Urinalysis with Reflex to Culture    Specimen: Urine   Result Value Ref Range    Color, UA Yellow Yellow    Turbidity UA Clear Clear    Glucose, Ur NEGATIVE NEGATIVE    Bilirubin Urine NEGATIVE NEGATIVE    Ketones, Urine MODERATE (A) NEGATIVE    Specific Gravity, UA 1.042 (H) 1.005 - 1.030    Urine Hgb LARGE (A) NEGATIVE    pH, UA 7.5 5.0 - 8.0    Protein, UA 1+ (A) NEGATIVE    Urobilinogen, Urine Normal Normal    Nitrite, Urine NEGATIVE NEGATIVE    Leukocyte Esterase, Urine NEGATIVE NEGATIVE   CHLORIDE, URINE, RANDOM   Result Value Ref Range    Chloride, Ur 92 mmol/L   SODIUM, URINE, RANDOM   Result Value Ref Range    Sodium,Ur 114 mmol/L   OSMOLALITY, URINE   Result Value Ref Range    Osmolality, Ur 442 80 - 1300 mOsm/kg   Troponin   Result Value Ref Range    Troponin, High Sensitivity 15 (H) 0 - 14 ng/L   Brain Natriuretic Peptide   Result Value Ref Range    Pro- (H) <300 pg/mL   ELECTROLYTES PLUS   Result Value Ref Range    POC Sodium 117 (LL) 138 - 146 mmol/L    POC Potassium 3.5 3.5 - 4.5 mmol/L    POC Chloride 83 (L) 98 - 107 mmol/L    POC TCO2 18 (L) 22 - 30 mmol/L    Anion Gap 17 (H) 7 - 16 mmol/L   Hemoglobin and hematocrit, blood   Result Value Ref Range    POC Hemoglobin 13.0 12.0 - 16.0 g/dL    POC Hematocrit 38 36 - 46 %   CALCIUM, IONIC (POC)   Result Value Ref Range    POC Ionized Calcium 1.08 (L) 1.15 - 1.33 mmol/L   Immature Platelet Fraction   Result Value Ref Range    Platelet, Immature Fraction 6.5 1.1 - 10.3 %    Platelet, Fluorescence 381 138 - 453 k/uL   Microscopic Urinalysis   Result Value Ref Range    WBC, UA None 0 - 5 /HPF    RBC, UA 50  0 - 4 /HPF    Casts UA  0 - 8 /LPF     0 TO 2 HYALINE Reference range defined for non-centrifuged specimen.     Epithelial Cells UA 0 TO 2 0 - 5 /HPF   Venous Blood Gas, POC   Result Value Ref Range    pH, Mynor 7.444 (H) 7.320 - 7.430    pCO2, Mynor 28.0 (L) 41.0 - 51.0 mm Hg    pO2, Mynor 40.0 30.0 - 50.0 mm Hg    HCO3, Venous 19.2 (L) 22.0 - 29.0 mmol/L    Negative Base Excess, Mynor 4 (H) 0.0 - 2.0    O2 Sat, Mynor 78 60.0 - 85.0 %   Creatinine W/GFR Point of Care   Result Value Ref Range    POC Creatinine 0.76 0.51 - 1.19 mg/dL    eGFR, POC >60 mL/min/1.73m2   POCT urea (BUN)   Result Value Ref Range    POC BUN 8 8 - 26 mg/dL   Lactic Acid, POC   Result Value Ref Range    POC Lactic Acid 0.84 0.56 - 1.39 mmol/L   POCT Glucose   Result Value Ref Range    POC Glucose 104 (H) 74 - 100 mg/dL       RADIOLOGY:  No results found. EKG Interpretation    Interpreted by myself    Rhythm: normal sinus   Rate: normal  Axis: normal  Ectopy: premature ventricular contractions (infrequent)  Conduction: normal  ST Segments: normal  T Waves: normal  Q Waves: none    Clinical Impression: non-specific EKG    All EKG's are interpreted by the Emergency Department Physician who either signs or Co-signs this chart in the absence of a cardiologist.    MDM  Medical Decision Making  Here for altered mental status likely related to hyponatremia. Nephrology consulted patient started on 3% drip central line placed. Patient maintaining airway. There was concern for possible TIA stroke consult was called. Patient NIH of 1 for not answering questions appropriately. No focal deficits on exam.  It was noted to have this aneurysm of the left ICA outlying facility. Patient mated to critical care for further work-up    Amount and/or Complexity of Data Reviewed  External Data Reviewed: labs, radiology, ECG and notes. Labs: ordered. Radiology:  Decision-making details documented in ED Course. ECG/medicine tests: ordered. Decision-making details documented in ED Course.   Discussion of management or test interpretation with external provider(s): Spoke with nephrology regarding management hyponatremic, spoke with neurology regarding TIA, spoke with critical care regarding admission    Risk  Prescription drug management. Decision regarding hospitalization. EMERGENCY DEPARTMENT COURSE:  ED Course as of 01/09/23 2340   Mon Jan 09, 2023 2013 Patient presents as a transfer from [facility] for sodium of 116 altered mental status. There is also a reported possible TIA. Last known well 10:30 AM.  Patient presented to [facility] emergency department neurologically intact asymptomatic however there was reported left upper extremity flaccid paralysis and reduced sensation that had resolved by the time she got to the emergency department there. She appears neurologically intact here. But is only alert to her self [ZE]   2206 Patient excepted by critical care. Spoke with critical care team see patient needs to go to neuro critical care given left ICA aneurysm. Spoke with neuro critical care they have declined. We will speak again with critical care for admission [ZE]   9306 Spoke with nephrology we will start 3% at 25 cc an hour we will do every 2 sodium checks.  [ZE]   [phone] Spoke to ICU resident who stated that Dr. Michele Duran would accept the patient - was requesting Neuro and Nephro involvement which have already been contacted [CK]      ED Course User Index  [CK] Allyssa Kennedy MD  [ZE] Parvin Cardoza DO       PROCEDURES:  Central Line    Date/Time: 1/9/2023 11:40 PM  Performed by: Parvin Cardoza DO  Authorized by: Allyssa Kennedy MD     Consent:     Consent obtained:  Emergent situation  Pre-procedure details:     Indication(s): central venous access      Skin preparation:  Chlorhexidine with alcohol  Sedation:     Sedation type:  None  Anesthesia:     Anesthesia method:  Local infiltration    Local anesthetic:  Lidocaine 1% w/o epi  Procedure details:     Location:  R femoral    Patient position:  Supine    Procedural supplies:  Triple lumen    Catheter size:  7 Fr    Landmarks identified: yes      Ultrasound guidance: yes      Ultrasound guidance timing: real time      Sterile ultrasound techniques: Sterile gel and sterile probe covers were used      Number of attempts:  1    Successful placement: yes    Post-procedure details:     Post-procedure:  Dressing applied and line sutured    Assessment:  Blood return through all ports and free fluid flow    Procedure completion:  Tolerated     CONSULTS:  IP CONSULT TO NEUROCRITICAL CARE  IP CONSULT TO NEPHROLOGY    CRITICAL CARE:  See MDM    FINAL IMPRESSION      1. Altered mental status, unspecified altered mental status type    2. Hyponatremia          DISPOSITION / PLAN     DISPOSITION Admitted 01/09/2023 11:36:55 PM      PATIENT REFERRED TO:  No follow-up provider specified.     DISCHARGE MEDICATIONS:  New Prescriptions    No medications on file       Rosie Acosta DO  Emergency Medicine Resident    (Please note that portions of thisnote were completed with a voice recognition program.  Efforts were made to edit the dictations but occasionally words are mis-transcribed.)        Cassy Streeter DO  Resident  01/09/23 1625

## 2023-01-10 NOTE — CONSULTS
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 woman w/hx of hypothyroidism, CKD stage 3, osteopenia, and recent right ankle fracture at [place] time 12/25/22. She went to acute rehab on [place], during the acute rehab stay patient was found to have confused, however on the 1/9/2023, patient was sent to [facility] ED for left arm and face weakness and numbness. Her left side weakness symptoms completely resolved at time she arrived [facility] but patient remain encephalopathic. During the workup, CTA was done CTA showed ICA aneurysm. Patient was also found to have hypoantremia and was started on hypertonic saline. Patient denies previous episodes of hyponatremia and denies alcohol or tobacco use. She used to smoke for more than 50 pack year, but quitted smoking 15 years ago. Allergies  is allergic to doxycycline, pcn [penicillins], pneumococcal vaccines, and tetanus toxoids. Medications  Prior to Admission medications    Medication Sig Start Date End Date Taking? Authorizing Provider   vitamin C (ASCORBIC ACID) 500 MG tablet Take 500 mg by mouth daily    Historical Provider, MD   Multiple Vitamins-Minerals (THERAPEUTIC MULTIVITAMIN-MINERALS) tablet Take 1 tablet by mouth daily    Historical Provider, MD   levothyroxine (SYNTHROID) 75 MCG tablet Take 1 tablet by mouth Daily 10/11/22   Wendy Ambrocio MD   aspirin EC 81 MG EC tablet Take 1 tablet by mouth daily 6/1/21   Wendy Ambrocio MD   Calcium Carb-Cholecalciferol 600-800 MG-UNIT TABS Take 1 tablet by mouth daily    Sofia Schaeffer MD   traMADol (ULTRAM) 50 MG tablet Take 50 mg by mouth 2 times daily as needed.  9/29/14   Historical Provider, MD    Scheduled Meds:   aspirin EC  81 mg Oral Daily    levothyroxine  75 mcg Oral Daily    sodium chloride flush  5-40 mL IntraVENous 2 times per day    enoxaparin  40 mg SubCUTAneous Daily     Continuous Infusions:   sodium chloride       PRN Meds:.sodium chloride flush, sodium chloride, ondansetron **OR** ondansetron, polyethylene glycol, acetaminophen **OR** acetaminophen, oxyCODONE-acetaminophen  Past Medical History   has a past medical history of Arthritis, CAD (coronary artery disease), Edema, Hematuria, Hypotension, unspecified, Pure hypercholesterolemia, Specified congenital anomalies of hair, Unspecified hypothyroidism, and Unspecified vitamin D deficiency. Past Surgical History   has a past surgical history that includes Cardiac surgery (1996); Colonoscopy (4/2008); Cataract removal with implant (Bilateral, 02/17/2016); and Colonoscopy (12/04/2018). Social History   reports that she quit smoking about 14 years ago. Her smoking use included cigarettes. She has a 5.50 pack-year smoking history. She has never used smokeless tobacco.   reports no history of alcohol use. reports no history of drug use. Family History  family history is not on file. She was adopted. Review of Systems:  CONSTITUTIONAL:  negative for fevers, chills, fatigue and malaise    EYES:  negative for double vision, blurred vision and photophobia     HEENT:  negative for tinnitus, epistaxis and sore throat    RESPIRATORY:  negative for cough, shortness of breath, wheezing    CARDIOVASCULAR:  negative for chest pain, palpitations, syncope, edema    GASTROINTESTINAL:  negative for nausea, vomiting    GENITOURINARY:  negative for incontinence    MUSCULOSKELETAL:  negative for neck or back pain    NEUROLOGICAL:  Negative for weakness and tingling  negative for headaches and dizziness    PSYCHIATRIC:  negative for anxiety      Review of systems otherwise negative.       OBJECTIVE:     Vitals:    01/10/23 1400   BP:    Pulse:    Resp:    Temp: 98.5 °F (36.9 °C)   SpO2:         General:  Gen: normal habitus, NAD  HEENT: NCAT, mucosa moist  Cvs: RRR, S1 S2 normal  Resp: symmetric unlabored breathing  Abd: s/nd/nt  Ext: no edema  Skin: no lesions seen, warm and dry    Neuro:  Gen: awake and alert, oriented x3. Lang/speech: no aphasia or dysarthria. Follows commands. CN: PERRL, EOMI, VFF, V1-3 intact, face symmetric, hearing intact, shoulder shrug symmetric, tongue midline  Motor: grossly 5/5 UE and LE b/l  Sense: LT intact in all 4 ext. Coord: FTN and HTS intact b/l  DTR: deferred  Gait: narrow base gait    NIH Stroke Scale:   1a  Level of consciousness: 0 - alert; keenly responsive   1b. LOC questions:  0 - answers both questions correctly   1c. LOC commands: 0 - performs both tasks correctly   2. Best Gaze: 0 - normal   3. Visual: 0 - no visual loss   4. Facial Palsy: 1 - minor paralysis (flattened nasolabial fold, asymmetric on smiling)   5a. Motor left arm: 0 - no drift, limb holds 90 (or 45) degrees for full 10 seconds   5b. Motor right arm: 0 - no drift, limb holds 90 (or 45) degrees for full 10 seconds   6a. Motor left le - no drift; leg holds 30 degree position for full 5 seconds   6b  Motor right le - drift; leg falls by the end of the 5 second period but does not hit bed   7. Limb Ataxia: 0 - absent   8. Sensory: 0 - normal; no sensory loss   9. Best Language:  0 - no aphasia, normal   10. Dysarthria: 0 - normal   11. Extinction and Inattention: 0 - no abnormality         Total:   2 (1 point on the right leg due to fracture 3 weeks ago)     MRS: 4      LABS:   Reviewed.   Lab Results   Component Value Date    HGB 11.3 (L) 2023    WBC 11.1 2023    PLT See Reflexed IPF Result 2023     (L) 01/10/2023    BUN 9 2023    CREATININE 0.71 2023    AST 41 (H) 2023    ALT 22 2023    MG 1.5 (L) 01/10/2023    INR 1.0 2023      No results found for: COVID19    RADIOLOGY:   Images were personally reviewed including:    CTA head done on  2023 in an outside hospital: left ICA cavernous aneurysm 8mm      MRI brain 1/10/2023: no acute changes    ASSESSMENT:     82 year old woman with CKD stage 3, hypothyroidism, fell 3 weeks ago and broke her right foot, p/w acute left hemiparesis yesterday that resolved by the time she is in the ED. 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 MRI brain is neg for stroke. PLAN:     - will plan for a DSA tomorrow to investigate the aneurysm  - NPO after midnight    Risks and benefits discussed, risks include but are not limited to bruising, stroke, subarachnoid hemorrhage, death, retroperitoneal hematoma, pseudoaneurysm, lower extremity/renal/peripheral vascular compromise, informed consent obtained. Case discussed with Dr. Marina Crockett attending.     Jessica Baca MD  Stroke, [facility]  [facility]  Electronically signed 1/10/2023 at 4:11 PM

## 2023-01-10 NOTE — ED PROVIDER NOTES
[address]   Emergency Department  Emergency Medicine Attending Sign-out     Care of Lynette Blanchard was assumed from previous attending Dr. Jimbo Godfrey and is being seen for Extremity Weakness and Altered Mental Status  . The patient's initial evaluation and plan have been discussed with the prior provider who initially evaluated the patient. Attestation  I was available and discussed any additional care issues that arose and coordinated the management plans with the resident(s) caring for the patient during my duty period. Any areas of disagreement with resident's documentation of care or procedures are noted on the chart. I was personally present for the key portions of any/all procedures, during my duty period. I have documented in the chart those procedures where I was not present during the key portions. BRIEF PATIENT SUMMARY/MDM COURSE PER INITIAL PROVIDER:   RECENT VITALS:     Temp: 98.7 °F (37.1 °C),  Heart Rate: 92, Resp: 17, BP: 108/71, SpO2: 96 %    This patient is a [de-identified] y.o. Female with history from other facility due to altered mental status. There was question of left-sided deficit, however patient is currently moving everything at this time, was found to have hyponatremia at 116. CT head was negative, CTA did show an aneurysm that is not currently leaking. Neuro has been evaluated, nephrology contacted and wants 3 percent given.     DIAGNOSTICS/MEDICATIONS:     MEDICATIONS GIVEN:  ED Medication Orders (From admission, onward)      Start Ordered     Status Ordering Provider    01/09/23 2300 01/09/23 2246  sodium chloride 3 % solution  CONTINUOUS         Acknowledged SHANELLE BURKETT    01/09/23 2215 01/09/23 2200  morphine injection 2 mg  ONCE         Last MAR action: Given - by Adrienne eGe on 01/09/23 at 2203 Martita PAINTER    01/09/23 2145 01/09/23 2144  ondansetron (ZOFRAN) injection 4 mg  ONCE         Last MAR action: Given - by Adrienne Gee on 01/09/23 at 2148 Ernie Rodriguez             LABS    Labs Reviewed   CBC WITH AUTO DIFFERENTIAL - Abnormal; Notable for the following components:       Result Value    RBC 3.80 (*)     Hemoglobin 11.3 (*)     Hematocrit 31.9 (*)     MCHC 35.4 (*)     Seg Neutrophils 78 (*)     Lymphocytes 9 (*)     Eosinophils % 0 (*)     Immature Granulocytes 3 (*)     Segs Absolute 8.69 (*)     Absolute Lymph # 1.01 (*)     All other components within normal limits   COMPREHENSIVE METABOLIC PANEL - Abnormal; Notable for the following components:    Sodium 116 (*)     Potassium 3.5 (*)     Chloride 82 (*)     CO2 17 (*)     AST 41 (*)     All other components within normal limits   TSH WITH REFLEX - Abnormal; Notable for the following components:    TSH 7.06 (*)     All other components within normal limits   TOX SCR, BLD, ED - Abnormal; Notable for the following components:    Acetaminophen Level <5 (*)     Salicylate Lvl <1 (*)     All other components within normal limits   URINALYSIS WITH REFLEX TO CULTURE - Abnormal; Notable for the following components:    Ketones, Urine MODERATE (*)     Specific Gravity, UA 1.042 (*)     Urine Hgb LARGE (*)     Protein, UA 1+ (*)     All other components within normal limits   TROPONIN - Abnormal; Notable for the following components:    Troponin, High Sensitivity 15 (*)     All other components within normal limits   BRAIN NATRIURETIC PEPTIDE - Abnormal; Notable for the following components:    Pro- (*)     All other components within normal limits   ELECTROLYTES PLUS - Abnormal; Notable for the following components:    POC Sodium 117 (*)     POC Chloride 83 (*)     POC TCO2 18 (*)     Anion Gap 17 (*)     All other components within normal limits   CALCIUM, IONIC (POC) - Abnormal; Notable for the following components:    POC Ionized Calcium 1.08 (*)     All other components within normal limits   VENOUS BLOOD GAS, POINT OF CARE - Abnormal; Notable for the following components: pH, [place] 7.444 (*)     pCO2, [place] 28.0 (*)     HCO3, Venous 19.2 (*)     Negative Base Excess, M[place]or 4 (*)     All other components within normal limits   POCT GLUCOSE - Abnormal; Notable for the following components:    POC Glucose 104 (*)     All other components within normal limits   LIPASE   AMMONIA   PROTIME-INR   CHLORIDE, URINE, RANDOM   SODIUM, URINE, RANDOM   OSMOLALITY, URINE   HGB/HCT   IMMATURE PLATELET FRACTION   MICROSCOPIC URINALYSIS   OSMOLALITY   T4, FREE   MAGNESIUM   PHOSPHORUS   SODIUM   SODIUM   SODIUM   SODIUM   SODIUM   SODIUM   SODIUM   CREATININE W/GFR POINT OF CARE   UREA N (POC)   LACTIC ACID,POINT OF CARE       RADIOLOGY  No results found. OUTSTANDING TASKS / ADDITIONAL COMMENTS   Right-sided femoral central line placed by resident physician. See separate procedure note. Bedside point of care ultrasound performed. Guidewire confirmed. Read/report available in Epic under imaging section and images available in PACS. Femoral access used instead of IJ due to the fact that patient is confused were concerned that she would pull out lines if they were up in her neck. I spoke to ICU resident who did accept the patient.   Nephrology and Neurology already evaluated the patient     Terrance Barrett MD  Emergency Medicine Attending  [id number] Obando MD  01/09/23 0382

## 2023-01-11 ENCOUNTER — APPOINTMENT (OUTPATIENT)
Dept: INTERVENTIONAL RADIOLOGY/VASCULAR | Age: 83
DRG: 981 | End: 2023-01-11
Payer: COMMERCIAL

## 2023-01-11 LAB
ABSOLUTE EOS #: 0 K/UL (ref 0–0.4)
ABSOLUTE IMMATURE GRANULOCYTE: 0 K/UL (ref 0–0.3)
ABSOLUTE LYMPH #: 1.68 K/UL (ref 1–4.8)
ABSOLUTE MONO #: 0.49 K/UL (ref 0.1–0.8)
ANION GAP SERPL CALCULATED.3IONS-SCNC: 17 MMOL/L (ref 9–17)
BASOPHILS # BLD: 0 % (ref 0–2)
BASOPHILS ABSOLUTE: 0 K/UL (ref 0–0.2)
BUN BLDV-MCNC: 6 MG/DL (ref 8–23)
CALCIUM SERPL-MCNC: 8.8 MG/DL (ref 8.6–10.4)
CARBOXYHEMOGLOBIN: 1 % (ref 0–5)
CHLORIDE BLD-SCNC: 89 MMOL/L (ref 98–107)
CO2: 16 MMOL/L (ref 20–31)
CREAT SERPL-MCNC: 0.75 MG/DL (ref 0.5–0.9)
EOSINOPHILS RELATIVE PERCENT: 0 % (ref 1–4)
FIO2: ABNORMAL
GFR SERPL CREATININE-BSD FRML MDRD: >60 ML/MIN/1.73M2
GLUCOSE BLD-MCNC: 76 MG/DL (ref 70–99)
GLUCOSE BLD-MCNC: 84 MG/DL (ref 65–105)
HCO3 VENOUS: 19.5 MMOL/L (ref 24–30)
HCT VFR BLD CALC: 32.1 % (ref 36.3–47.1)
HEMOGLOBIN: 10.8 G/DL (ref 11.9–15.1)
IMMATURE GRANULOCYTES: 0 %
LYMPHOCYTES # BLD: 24 % (ref 24–44)
MCH RBC QN AUTO: 29.7 PG (ref 25.2–33.5)
MCHC RBC AUTO-ENTMCNC: 33.6 G/DL (ref 28.4–34.8)
MCV RBC AUTO: 88.2 FL (ref 82.6–102.9)
MONOCYTES # BLD: 7 % (ref 1–7)
MORPHOLOGY: NORMAL
NEGATIVE BASE EXCESS, VEN: 5.2 MMOL/L (ref 0–2)
NRBC AUTOMATED: 0 PER 100 WBC
O2 SAT, VEN: 70.1 % (ref 60–85)
PATIENT TEMP: 37
PCO2, VEN: 37.6 MM HG (ref 39–55)
PDW BLD-RTO: 13.9 % (ref 11.8–14.4)
PH VENOUS: 7.34 (ref 7.32–7.42)
PLATELET # BLD: 354 K/UL (ref 138–453)
PMV BLD AUTO: 10.2 FL (ref 8.1–13.5)
PO2, VEN: 35.7 MM HG (ref 30–50)
POTASSIUM SERPL-SCNC: 4.1 MMOL/L (ref 3.7–5.3)
RBC # BLD: 3.64 M/UL (ref 3.95–5.11)
SEG NEUTROPHILS: 69 % (ref 36–66)
SEGMENTED NEUTROPHILS ABSOLUTE COUNT: 4.83 K/UL (ref 1.8–7.7)
SODIUM BLD-SCNC: 121 MMOL/L (ref 135–144)
SODIUM BLD-SCNC: 122 MMOL/L (ref 135–144)
SODIUM BLD-SCNC: 123 MMOL/L (ref 135–144)
WBC # BLD: 7 K/UL (ref 3.5–11.3)

## 2023-01-11 PROCEDURE — C1887 CATHETER, GUIDING: HCPCS

## 2023-01-11 PROCEDURE — 82805 BLOOD GASES W/O2 SATURATION: CPT

## 2023-01-11 PROCEDURE — 6370000000 HC RX 637 (ALT 250 FOR IP): Performed by: STUDENT IN AN ORGANIZED HEALTH CARE EDUCATION/TRAINING PROGRAM

## 2023-01-11 PROCEDURE — 2580000003 HC RX 258: Performed by: PSYCHIATRY & NEUROLOGY

## 2023-01-11 PROCEDURE — 82947 ASSAY GLUCOSE BLOOD QUANT: CPT

## 2023-01-11 PROCEDURE — 6360000004 HC RX CONTRAST MEDICATION: Performed by: INTERNAL MEDICINE

## 2023-01-11 PROCEDURE — C1760 CLOSURE DEV, VASC: HCPCS

## 2023-01-11 PROCEDURE — 36224 PLACE CATH CAROTD ART: CPT

## 2023-01-11 PROCEDURE — B31R1ZZ FLUOROSCOPY OF INTRACRANIAL ARTERIES USING LOW OSMOLAR CONTRAST: ICD-10-PCS | Performed by: PSYCHIATRY & NEUROLOGY

## 2023-01-11 PROCEDURE — 84295 ASSAY OF SERUM SODIUM: CPT

## 2023-01-11 PROCEDURE — C1894 INTRO/SHEATH, NON-LASER: HCPCS

## 2023-01-11 PROCEDURE — 99291 CRITICAL CARE FIRST HOUR: CPT | Performed by: INTERNAL MEDICINE

## 2023-01-11 PROCEDURE — 36226 PLACE CATH VERTEBRAL ART: CPT

## 2023-01-11 PROCEDURE — 99232 SBSQ HOSP IP/OBS MODERATE 35: CPT | Performed by: PSYCHIATRY & NEUROLOGY

## 2023-01-11 PROCEDURE — 2580000003 HC RX 258

## 2023-01-11 PROCEDURE — 6370000000 HC RX 637 (ALT 250 FOR IP): Performed by: INTERNAL MEDICINE

## 2023-01-11 PROCEDURE — 85025 COMPLETE CBC W/AUTO DIFF WBC: CPT

## 2023-01-11 PROCEDURE — 2000000000 HC ICU R&B

## 2023-01-11 PROCEDURE — B3151ZZ FLUOROSCOPY OF BILATERAL COMMON CAROTID ARTERIES USING LOW OSMOLAR CONTRAST: ICD-10-PCS | Performed by: PSYCHIATRY & NEUROLOGY

## 2023-01-11 PROCEDURE — 2709999900 HC NON-CHARGEABLE SUPPLY

## 2023-01-11 PROCEDURE — 99152 MOD SED SAME PHYS/QHP 5/>YRS: CPT

## 2023-01-11 PROCEDURE — B31D1ZZ FLUOROSCOPY OF RIGHT VERTEBRAL ARTERY USING LOW OSMOLAR CONTRAST: ICD-10-PCS | Performed by: PSYCHIATRY & NEUROLOGY

## 2023-01-11 PROCEDURE — 99223 1ST HOSP IP/OBS HIGH 75: CPT | Performed by: INTERNAL MEDICINE

## 2023-01-11 PROCEDURE — B3181ZZ FLUOROSCOPY OF BILATERAL INTERNAL CAROTID ARTERIES USING LOW OSMOLAR CONTRAST: ICD-10-PCS | Performed by: PSYCHIATRY & NEUROLOGY

## 2023-01-11 PROCEDURE — 2500000003 HC RX 250 WO HCPCS: Performed by: STUDENT IN AN ORGANIZED HEALTH CARE EDUCATION/TRAINING PROGRAM

## 2023-01-11 PROCEDURE — 36415 COLL VENOUS BLD VENIPUNCTURE: CPT

## 2023-01-11 PROCEDURE — 99153 MOD SED SAME PHYS/QHP EA: CPT

## 2023-01-11 PROCEDURE — 6360000002 HC RX W HCPCS: Performed by: STUDENT IN AN ORGANIZED HEALTH CARE EDUCATION/TRAINING PROGRAM

## 2023-01-11 PROCEDURE — 80048 BASIC METABOLIC PNL TOTAL CA: CPT

## 2023-01-11 PROCEDURE — C1769 GUIDE WIRE: HCPCS

## 2023-01-11 RX ORDER — ASPIRIN 325 MG
325 TABLET ORAL ONCE
Status: COMPLETED | OUTPATIENT
Start: 2023-01-11 | End: 2023-01-11

## 2023-01-11 RX ORDER — CLINDAMYCIN PHOSPHATE 300 MG/50ML
INJECTION INTRAVENOUS CONTINUOUS PRN
Status: COMPLETED | OUTPATIENT
Start: 2023-01-11 | End: 2023-01-11

## 2023-01-11 RX ORDER — CLOPIDOGREL BISULFATE 75 MG/1
300 TABLET ORAL ONCE
Status: COMPLETED | OUTPATIENT
Start: 2023-01-11 | End: 2023-01-11

## 2023-01-11 RX ORDER — ONDANSETRON 4 MG/1
4 TABLET, ORALLY DISINTEGRATING ORAL EVERY 8 HOURS PRN
Status: DISCONTINUED | OUTPATIENT
Start: 2023-01-11 | End: 2023-01-12 | Stop reason: SDUPTHER

## 2023-01-11 RX ORDER — SODIUM CHLORIDE 0.9 % (FLUSH) 0.9 %
5-40 SYRINGE (ML) INJECTION PRN
Status: DISCONTINUED | OUTPATIENT
Start: 2023-01-11 | End: 2023-01-17 | Stop reason: HOSPADM

## 2023-01-11 RX ORDER — SODIUM CHLORIDE 450 MG/100ML
INJECTION, SOLUTION INTRAVENOUS CONTINUOUS
Status: DISCONTINUED | OUTPATIENT
Start: 2023-01-11 | End: 2023-01-11

## 2023-01-11 RX ORDER — FENTANYL CITRATE 50 UG/ML
INJECTION, SOLUTION INTRAMUSCULAR; INTRAVENOUS
Status: COMPLETED | OUTPATIENT
Start: 2023-01-11 | End: 2023-01-11

## 2023-01-11 RX ORDER — SODIUM CHLORIDE 1000 MG
2 TABLET, SOLUBLE MISCELLANEOUS ONCE
Status: COMPLETED | OUTPATIENT
Start: 2023-01-11 | End: 2023-01-11

## 2023-01-11 RX ORDER — ACETAMINOPHEN 325 MG/1
650 TABLET ORAL EVERY 4 HOURS PRN
Status: DISCONTINUED | OUTPATIENT
Start: 2023-01-11 | End: 2023-01-12 | Stop reason: SDUPTHER

## 2023-01-11 RX ORDER — ONDANSETRON 2 MG/ML
4 INJECTION INTRAMUSCULAR; INTRAVENOUS EVERY 6 HOURS PRN
Status: DISCONTINUED | OUTPATIENT
Start: 2023-01-11 | End: 2023-01-12 | Stop reason: SDUPTHER

## 2023-01-11 RX ORDER — CLOPIDOGREL BISULFATE 75 MG/1
75 TABLET ORAL DAILY
Status: DISCONTINUED | OUTPATIENT
Start: 2023-01-12 | End: 2023-01-17 | Stop reason: HOSPADM

## 2023-01-11 RX ORDER — MIDAZOLAM HYDROCHLORIDE 2 MG/2ML
INJECTION, SOLUTION INTRAMUSCULAR; INTRAVENOUS
Status: COMPLETED | OUTPATIENT
Start: 2023-01-11 | End: 2023-01-11

## 2023-01-11 RX ORDER — SODIUM CHLORIDE 0.9 % (FLUSH) 0.9 %
5-40 SYRINGE (ML) INJECTION EVERY 12 HOURS SCHEDULED
Status: DISCONTINUED | OUTPATIENT
Start: 2023-01-11 | End: 2023-01-17 | Stop reason: HOSPADM

## 2023-01-11 RX ORDER — SODIUM CHLORIDE 9 MG/ML
INJECTION, SOLUTION INTRAVENOUS PRN
Status: DISCONTINUED | OUTPATIENT
Start: 2023-01-11 | End: 2023-01-17 | Stop reason: HOSPADM

## 2023-01-11 RX ORDER — IODIXANOL 320 MG/ML
100 INJECTION, SOLUTION INTRAVASCULAR
Status: COMPLETED | OUTPATIENT
Start: 2023-01-11 | End: 2023-01-11

## 2023-01-11 RX ORDER — HEPARIN SODIUM 1000 [USP'U]/ML
INJECTION, SOLUTION INTRAVENOUS; SUBCUTANEOUS
Status: COMPLETED | OUTPATIENT
Start: 2023-01-11 | End: 2023-01-11

## 2023-01-11 RX ADMIN — MIDAZOLAM HYDROCHLORIDE 0.5 MG: 1 INJECTION, SOLUTION INTRAMUSCULAR; INTRAVENOUS at 12:48

## 2023-01-11 RX ADMIN — FENTANYL CITRATE 50 MCG: 50 INJECTION, SOLUTION INTRAMUSCULAR; INTRAVENOUS at 12:48

## 2023-01-11 RX ADMIN — HEPARIN SODIUM 2000 UNITS: 1000 INJECTION, SOLUTION INTRAVENOUS; SUBCUTANEOUS at 12:50

## 2023-01-11 RX ADMIN — IODIXANOL 56 ML: 320 INJECTION, SOLUTION INTRAVASCULAR at 13:53

## 2023-01-11 RX ADMIN — OXYCODONE HYDROCHLORIDE AND ACETAMINOPHEN 1 TABLET: 5; 325 TABLET ORAL at 04:10

## 2023-01-11 RX ADMIN — ASPIRIN 325 MG: 325 TABLET ORAL at 17:27

## 2023-01-11 RX ADMIN — SODIUM CHLORIDE, PRESERVATIVE FREE 10 ML: 5 INJECTION INTRAVENOUS at 23:00

## 2023-01-11 RX ADMIN — CLINDAMYCIN IN 5 PERCENT DEXTROSE 600 MG: 6 INJECTION, SOLUTION INTRAVENOUS at 12:30

## 2023-01-11 RX ADMIN — SODIUM CHLORIDE, PRESERVATIVE FREE 10 ML: 5 INJECTION INTRAVENOUS at 21:00

## 2023-01-11 RX ADMIN — SODIUM CHLORIDE TAB 1 GM 2 G: 1 TAB at 17:39

## 2023-01-11 RX ADMIN — SODIUM CHLORIDE, PRESERVATIVE FREE 10 ML: 5 INJECTION INTRAVENOUS at 09:21

## 2023-01-11 RX ADMIN — CLOPIDOGREL 300 MG: 75 TABLET, FILM COATED ORAL at 17:27

## 2023-01-11 RX ADMIN — SODIUM CHLORIDE: 4.5 INJECTION, SOLUTION INTRAVENOUS at 01:03

## 2023-01-11 ASSESSMENT — PULMONARY FUNCTION TESTS
PIF_VALUE: 0

## 2023-01-11 ASSESSMENT — PAIN DESCRIPTION - ORIENTATION
ORIENTATION: RIGHT
ORIENTATION: RIGHT

## 2023-01-11 ASSESSMENT — PAIN DESCRIPTION - LOCATION
LOCATION: OTHER (COMMENT)
LOCATION: ANKLE

## 2023-01-11 ASSESSMENT — PAIN SCALES - GENERAL
PAINLEVEL_OUTOF10: 7
PAINLEVEL_OUTOF10: 4
PAINLEVEL_OUTOF10: 0
PAINLEVEL_OUTOF10: 2

## 2023-01-11 ASSESSMENT — PAIN DESCRIPTION - PAIN TYPE: TYPE: OTHER (COMMENT)

## 2023-01-11 ASSESSMENT — PAIN - FUNCTIONAL ASSESSMENT: PAIN_FUNCTIONAL_ASSESSMENT: ACTIVITIES ARE NOT PREVENTED

## 2023-01-11 ASSESSMENT — PAIN DESCRIPTION - DESCRIPTORS: DESCRIPTORS: DISCOMFORT

## 2023-01-11 NOTE — PROGRESS NOTES
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    SUBJECTIVE:     No acute event overnight, getting DSA today, sodium level gradually improved    Review of Systems:  CONSTITUTIONAL:  negative for fevers, chills, fatigue and malaise    EYES:  negative for double vision, blurred vision and photophobia     HEENT:  negative for tinnitus, epistaxis and sore throat    RESPIRATORY:  negative for cough, shortness of breath, wheezing    CARDIOVASCULAR:  negative for chest pain, palpitations, syncope, edema    GASTROINTESTINAL:  negative for nausea, vomiting    GENITOURINARY:  negative for incontinence    MUSCULOSKELETAL:  negative for neck or back pain    NEUROLOGICAL:  Negative for weakness and tingling  negative for headaches and dizziness    PSYCHIATRIC:  negative for anxiety      Review of systems otherwise negative. OBJECTIVE:     Vitals:    01/11/23 0800   BP: (!) 92/47   Pulse: 72   Resp: (!) 8   Temp:    SpO2: 99%        General:  Gen: normal habitus, NAD  HEENT: NCAT, mucosa moist  Cvs: RRR, S1 S2 normal  Resp: symmetric unlabored breathing  Abd: s/nd/nt  Ext: no edema  Skin: no lesions seen, warm and dry    Neuro:  Gen: awake and alert, oriented x3. Lang/speech: no aphasia or dysarthria. Follows commands. CN: PERRL, EOMI, VFF, V1-3 intact, face symmetric, hearing intact, shoulder shrug symmetric, tongue midline  Motor: grossly 5/5 UE and LE b/l  Sense: LT intact in all 4 ext. Coord: FTN and HTS intact b/l  DTR: deferred  Gait: narrow base gait    NIH Stroke Scale:   1a  Level of consciousness: 0 - alert; keenly responsive   1b. LOC questions:  0 - answers both questions correctly   1c. LOC commands: 0 - performs both tasks correctly   2. Best Gaze: 0 - normal   3. Visual: 0 - no visual loss   4. Facial Palsy: 1 - minor paralysis (flattened nasolabial fold, asymmetric on smiling)   5a.  Motor left arm: 0 - no drift, limb holds 90 (or 45) degrees for full 10 seconds   5b. Motor right arm: 0 - no drift, limb holds 90 (or 45) degrees for full 10 seconds   6a. Motor left le - no drift; leg holds 30 degree position for full 5 seconds   6b  Motor right le - drift; leg falls by the end of the 5 second period but does not hit bed   7. Limb Ataxia: 0 - absent   8. Sensory: 0 - normal; no sensory loss   9. Best Language:  0 - no aphasia, normal   10. Dysarthria: 0 - normal   11. Extinction and Inattention: 0 - no abnormality         Total:   2 (1 point on the right leg due to fracture 3 weeks ago)     MRS: 4      LABS:   Reviewed. Lab Results   Component Value Date    HGB 10.8 (L) 2023    WBC 7.0 2023     2023     (L) 2023    BUN 6 (L) 2023    CREATININE 0.75 2023    AST 41 (H) 2023    ALT 22 2023    MG 1.5 (L) 01/10/2023    INR 1.0 2023      No results found for: COVID19    RADIOLOGY:   Images were personally reviewed including:    CTA head done on  2023 in an outside hospital: left ICA cavernous aneurysm 8mm      MRI brain 1/10/2023: no acute changes    ASSESSMENT:     [de-identified]year old woman with CKD stage 3, hypothyroidism, fell 3 weeks ago and broke her right foot, p/w acute left hemiparesis yesterday that resolved by the time she is in the ED. 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 MRI brain is neg for stroke. PLAN:     - will plan for a DSA today to investigate the aneurysm  - NPO after midnight    Risks and benefits discussed, risks include but are not limited to bruising, stroke, subarachnoid hemorrhage, death, retroperitoneal hematoma, pseudoaneurysm, lower extremity/renal/peripheral vascular compromise, informed consent obtained. Case discussed with Dr. Wharton [facility] attending.     Farzad Esparza MD  Stroke, [facility]  [address] [place] [address] [address] [facility]  Electronically signed 1/11/2023 at 8:48 AM

## 2023-01-11 NOTE — PROGRESS NOTES
1200: Pt transported to IR for procedure with writer. 1434: Back to patient room (3002) Patients Son waiting in room. Update provided, and POC explained.

## 2023-01-11 NOTE — PLAN OF CARE
Problem: Discharge Planning  Goal: Discharge to home or other facility with appropriate resources  Outcome: Progressing  Flowsheets (Taken 1/11/2023 0800)  Discharge to home or other facility with appropriate resources: Refer to discharge planning if patient needs post-hospital services based on physician order or complex needs related to functional status, cognitive ability or social support system     Problem: Pain  Goal: Verbalizes/displays adequate comfort level or baseline comfort level  Outcome: Progressing  Flowsheets (Taken 1/11/2023 0800)  Verbalizes/displays adequate comfort level or baseline comfort level: Assess pain using appropriate pain scale     Problem: Confusion  Goal: Confusion, delirium, dementia, or psychosis is improved or at baseline  Description: INTERVENTIONS:  1. Assess for possible contributors to thought disturbance, including medications, impaired vision or hearing, underlying metabolic abnormalities, dehydration, psychiatric diagnoses, and notify attending LIP  2. [place] high risk fall precautions, as indicated  3. Provide frequent short contacts to provide reality reorientation, refocusing and direction  4. Decrease environmental stimuli, including noise as appropriate  5. Monitor and intervene to maintain adequate nutrition, hydration, elimination, sleep and activity  6. If unable to ensure safety without constant attention obtain sitter and review sitter guidelines with assigned personnel  7.  Initiate Psychosocial CNS and Spiritual Care consult, as indicated  Outcome: Progressing  Flowsheets (Taken 1/11/2023 0800)  Effect of thought disturbance (confusion, delirium, dementia, or psychosis) are managed with adequate functional status: Assess for contributors to thought disturbance, including medications, impaired vision or hearing, underlying metabolic abnormalities, dehydration, psychiatric diagnoses, notify LIP     Problem: Safety - Adult  Goal: Free from fall injury  Outcome: Progressing  Flowsheets (Taken 1/11/2023 0800)  Free From Fall Injury: Based on caregiver fall risk screen, instruct family/caregiver to ask for assistance with transferring infant if caregiver noted to have fall risk factors     Problem: ABCDS Injury Assessment  Goal: Absence of physical injury  Outcome: Progressing  Flowsheets (Taken 1/11/2023 0800)  Absence of Physical Injury: Implement safety measures based on patient assessment     Problem: Skin/Tissue Integrity  Goal: Absence of new skin breakdown  Description: 1. Monitor for areas of redness and/or skin breakdown  2. Assess vascular access sites hourly  3. Every 4-6 hours minimum:  Change oxygen saturation probe site  4. Every 4-6 hours:  If on nasal continuous positive airway pressure, respiratory therapy assess nares and determine need for appliance change or resting period.   Outcome: Progressing

## 2023-01-11 NOTE — PROGRESS NOTES
[address] [facility] Neurology   [address] [place] [address]    Progress Note             Date:   1/11/2023  Patient name:  Roberto Frankel  Date of admission:  1/9/2023  8:01 PM  MRN:   [id number]  Account:  [de-identified]  YOB: 1940  PCP:    Arabella Goodson MD  Room:   [address] [place] [address]  Code Status:    Full Code    Chief Complaint:     Chief Complaint   Patient presents with    Extremity Weakness    Altered Mental Status       Interval hx: The patient was seen and examined at bedside. Is vitally stable, alert and oriented x 3. No acute events overnight. Brief History of Present Illness: The patient is a [de-identified] y.o. female who presents with left upper extremity numbness and weakness, left facial droop and altered mentation. Patient has a past medical history of CAD/PVD on aspirin and Lipitor as well as hypothyroidism on Synthroid. Patient recently underwent fixation of right ankle by orthopedic surgery, continued with aspirin throughout. Patient presented from acute rehab unit after they had noticed that her left upper extremity was weak and on evaluation some numbness was present. She was taken to [facility] ED where she was evaluated for potential stroke. CT head was unremarkable for any acute changes, CTA did not show a left ICA aneurysm but no LVO or critical stenosis. At this time films are not available on PACS, read based on report. Of note patient's sodium was found to be 116 on initial labs. Last known well reportedly around 9 AM.  She was transferred to [facility] ED for medical ICU admission for management of hyponatremia. Upon arrival to [facility], patient's left-sided weakness and numbness had resolved. Patient does appear encephalopathic and requires repeated stimulation to arouse. Initial NIH was 7 for level of consciousness, commands and questions, mild dysmetria in general but equal mild weakness of both legs.   With repeated prompting, patient was able to maintain both arms in the air for the full 10 seconds. Initial blood pressure 118, blood glucose 125. Of note patient is on tramadol 50 mg twice daily chronically. 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 Hola Boone         Medications Prior to Admission:     Prior to Admission medications    Medication Sig Start Date End Date Taking? Authorizing Provider   vitamin C (ASCORBIC ACID) 500 MG tablet Take 500 mg by mouth daily    Historical Provider, MD   Multiple Vitamins-Minerals (THERAPEUTIC MULTIVITAMIN-MINERALS) tablet Take 1 tablet by mouth daily    Historical Provider, MD   levothyroxine (SYNTHROID) 75 MCG tablet Take 1 tablet by mouth Daily 10/11/22   Marjan Reid MD   aspirin EC 81 MG EC tablet Take 1 tablet by mouth daily 6/1/21   Marjan Reid MD   Calcium Carb-Cholecalciferol 600-800 MG-UNIT TABS Take 1 tablet by mouth daily    Historical Provider, MD   traMADol (ULTRAM) 50 MG tablet Take 50 mg by mouth 2 times daily as needed. 9/29/14   Historical Provider, MD        Allergies:     Doxycycline, Pcn [penicillins], Pneumococcal vaccines, and Tetanus toxoids    Social History:     Tobacco:    reports that she quit smoking about 14 years ago. Her smoking use included cigarettes. She has a 5.50 pack-year smoking history.  She has never used smokeless tobacco.  Alcohol:      reports no history of alcohol use. Drug Use:  reports no history of drug use.     Family History:     Family History   Adopted: Yes       Review of Systems:     Review of Systems    Physical Exam:   BP (!) 132/50   Pulse 70   Temp 97.7 °F (36.5 °C) (Oral)   Resp (!) 0   Ht 5' 3\" (1.6 m)   Wt 119 lb 11.2 oz (54.3 kg)   SpO2 100%   BMI 21.20 kg/m²   Temp (24hrs), Av.9 °F (36.6 °C), Min:97.5 °F (36.4 °C), Max:98.5 °F (36.9 °C)    Recent Labs     01/09/23  2008 01/10/23  2247 23  0452   POCGLU 104* 84 84       Intake/Output Summary (Last 24 hours) at 2023 1632  Last data filed at 2023 0800  Gross per 24 hour   Intake 278.27 ml   Output 800 ml   Net -521.73 ml         Neurologic Exam     GENERAL  Appears comfortable and in no distress   HEENT  NC/ AT   HEART  S1 and S2 heard; palpation of pulses: radial pulse    NECK  Supple and no bruits heard   MENTAL STATUS:  Alert, oriented, intact memory, no confusion, normal speech, normal language, no hallucination or delusion   CRANIAL NERVES: II     -      Visual fields intact to confrontation  III,IV,VI -  PERR, EOMs full, no ptosis  V     -     Normal facial sensation   VII    -     Normal facial symmetry  VIII   -     Intact hearing   IX,X -     Symmetrical palate  XI    -     Symmetrical shoulder shrug  XII   -     Midline tongue, no atrophy    MOTOR FUNCTION: RUE: Significant for good strength of grade 5/5 in proximal and distal muscle groups   LUE: Significant for good strength of grade 5/5 in proximal and distal muscle groups   RLE: Significant for good strength of grade 5/5 in proximal and distal muscle groups   LLE: Significant for good strength of grade 5/5 in proximal and distal muscle groups      Normal bulk, normal tone and no involuntary movements, no tremor   SENSORY FUNCTION:  Normal touch, normal pinprick, normal vibration, normal proprioception   CEREBELLAR FUNCTION:  Intact fine motor control over upper limbs and lower limbs   REFLEX FUNCTION:  Symmetric in upper and lower extremities, no Babinski sign   STATION and GAIT  Normal gait and tandem station, normal tip toes and heel walking       Investigations:      Laboratory Testing:  Recent Results (from the past 24 hour(s))   Sodium Lab    Collection Time: 01/10/23  5:04 PM   Result Value Ref Range    Sodium 120 (L) 135 - 144 mmol/L   Sodium Lab    Collection Time: 01/10/23  7:10 PM   Result Value Ref Range    Sodium 120 (L) 135 - 144 mmol/L   Sodium Lab    Collection Time: 01/10/23  8:47 PM   Result Value Ref Range    Sodium 121 (L) 135 - 144 mmol/L   Sodium Lab    Collection Time: 01/10/23 10:41 PM   Result Value Ref Range    Sodium 123 (L) 135 - 144 mmol/L   POC Glucose Fingerstick    Collection Time: 01/10/23 10:47 PM   Result Value Ref Range    POC Glucose 84 65 - 105 mg/dL   Sodium Lab    Collection Time: 01/11/23  1:43 AM   Result Value Ref Range    Sodium 122 (L) 135 - 144 mmol/L   Sodium Lab    Collection Time: 01/11/23  3:17 AM   Result Value Ref Range    Sodium 121 (L) 135 - 144 mmol/L   POC Glucose Fingerstick    Collection Time: 01/11/23  4:52 AM   Result Value Ref Range    POC Glucose 84 65 - 105 mg/dL   Basic Metabolic Panel w/ Reflex to MG    Collection Time: 01/11/23  4:54 AM   Result Value Ref Range    Glucose 76 70 - 99 mg/dL    BUN 6 (L) 8 - 23 mg/dL    Creatinine 0.75 0.50 - 0.90 mg/dL    Est, Glom Filt Rate >60 >60 mL/min/1.73m2    Calcium 8.8 8.6 - 10.4 mg/dL    Sodium 122 (L) 135 - 144 mmol/L    Potassium 4.1 3.7 - 5.3 mmol/L    Chloride 89 (L) 98 - 107 mmol/L    CO2 16 (L) 20 - 31 mmol/L    Anion Gap 17 9 - 17 mmol/L   CBC with Auto Differential    Collection Time: 01/11/23  4:54 AM   Result Value Ref Range    WBC 7.0 3.5 - 11.3 k/uL    RBC 3.64 (L) 3.95 - 5.11 m/uL    Hemoglobin 10.8 (L) 11.9 - 15.1 g/dL    Hematocrit 32.1 (L) 36.3 - 47.1 %    MCV 88.2 82.6 - 102.9 fL    MCH 29.7 25.2 - 33.5 pg    MCHC 33.6 28.4 - 34.8 g/dL    RDW 13.9 11.8 - 14.4 %    Platelets 111 694 - 342 k/uL    MPV 10.2 8.1 - 13.5 fL    NRBC Automated 0.0 0.0 per 100 WBC    Immature Granulocytes 0 0 %    Seg Neutrophils 69 (H) 36 - 66 %    Lymphocytes 24 24 - 44 %    Monocytes 7 1 - 7 %    Eosinophils % 0 (L) 1 - 4 %    Basophils 0 0 - 2 %    Absolute Immature Granulocyte 0.00 0.00 - 0.30 k/uL    Segs Absolute 4.83 1.8 - 7.7 k/uL    Absolute Lymph # 1.68 1.0 - 4.8 k/uL    Absolute Mono # 0.49 0.1 - 0.8 k/uL    Absolute Eos # 0.00 0.0 - 0.4 k/uL    Basophils Absolute 0.00 0.0 - 0.2 k/uL    Morphology Normal    BLOOD GAS, VENOUS    Collection Time: 01/11/23 10:33 AM   Result Value Ref Range    pH, Mynor 7.336 7.320 - 7.420    pCO2, Mynor 37.6 (L) 39 - 55 mm Hg    pO2, Mynor 35.7 30 - 50 mm Hg    HCO3, Venous 19.5 (L) 24 - 30 mmol/L    Negative Base Excess, Mynor 5.2 (H) 0.0 - 2.0 mmol/L    O2 Sat, Mynor 70.1 60.0 - 85.0 %    Carboxyhemoglobin 1.0 0 - 5 %    Pt Temp 37.0     FIO2 INFORMATION NOT PROVIDED    Sodium    Collection Time: 01/11/23 10:33 AM   Result Value Ref Range    Sodium 122 (L) 135 - 144 mmol/L   Sodium    Collection Time: 01/11/23  3:14 PM   Result Value Ref Range    Sodium 123 (L) 135 - 144 mmol/L     Recent Labs     01/11/23 0454   WBC 7.0   RBC 3.64*   HGB 10.8*   HCT 32.1*   MCV 88.2   MCH 29.7   MCHC 33.6   RDW 13.9      MPV 10.2     Recent Labs     01/09/23 2024 01/09/23  2300 01/11/23  0454 01/11/23  1033 01/11/23  1514   *   < > 122*   < > 123*   K 3.5*  --  4.1  --   --    CL 82*  --  89*  --   --    CO2 17*  --  16*  --   --    BUN 9  --  6*  --   --    CREATININE 0.71  --  0.75  --   --    GLUCOSE 98  --  76  --   --    CALCIUM 8.8  --  8.8  --   --    PROT 7.3  --   --   --   --    LABALBU 3.8  --   --   --   --    BILITOT 0.5  --   --   --   --    ALKPHOS 82  --   --   --   --    AST 41*  --   --   --   --    ALT 22  --   --   --   --     < > = values in this interval not displayed.      No results found for: LABA1C    Assessment :      Primary Problem  Hyponatremia    Active Hospital Problems    Diagnosis Date Noted    Seizure-like activity ([facility] [address] [R56.9] 01/10/2023     Priority: Medium    Carotid aneurysm, left ([facility] [address] [I72.0] 01/10/2023     Priority: Medium    Altered mental status [R41.82] 01/10/2023     Priority: Medium    Cerebrovascular aneurysm [I67.1] 01/10/2023     Priority: Medium    TIA (transient ischemic attack) [G45.9] 01/10/2023     Priority: Medium    Hyponatremia [E87.1] 01/09/2023     Priority: Medium    Chronic renal failure, stage 3b ([facility] [address] [N18.32] 12/23/2021    Osteopenia [M85.80] 02/02/2016    History of DVT (deep vein thrombosis) [Z86.718] 02/02/2016    Hypothyroidism [E03.9] 10/08/2014    Pure hypercholesterolemia [E78.00] 10/08/2014    Vitamin D deficiency [E55.9] 10/08/2014    Arthritis [M19.90] 10/08/2014    CAD (coronary artery disease) [I25.10] 10/08/2014       Patient is a [de-identified] y.o. female past medical history of CAD, PVD on aspirin and Lipitor, hypothyroidism,   Patient underwent fixation of right ankle. Came from acute rehab, presented with persistent left upper extremity and weakness, left facial droop and altered mentation. CT head was unremarkable for any acute changes, CTA did not show a left ICA aneurysm but no LVO or critical stenosis. Sodium 116 initially. Plan:     MRI brain wo: 8 mm medially projecting left cavernous segment ICA aneurysm  EEG Disorganized and slow background suggested moderate to severe encephalopathy of non specific etiology. Underwent DSA  Hyponatremia management   Cont' ASA 81 and Lipitor 20 mg       Follow-up further recommendations after discussing the case with attending  The plan was discussed with the patient, patient's family and the medical staff.    Consultations:   IP CONSULT TO NEUROCRITICAL CARE  IP CONSULT TO NEPHROLOGY  IP CONSULT TO NEUROLOGY  IP CONSULT TO ENDOVASCULAR NEUROSURGERY    Patient is admitted as inpatient status because of co-morbidities listed above, severity of signs and symptoms as outlined, requirement for current medical therapies and most importantly because of direct risk to patient if care not provided in a hospital setting.     Aisha Rivera MD  Neurology Resident PGY-2   1/11/2023  4:32 PM    Copy sent to Dr. Christian Cabrera MD

## 2023-01-11 NOTE — PROGRESS NOTES
SUBJECTIVE    Chart reviewed. Patient was seen and examined. The initial H&P from Dr. Santiago Done as well as follow-up note from Dr. Mo Neither was reviewed. Patient is for PSA today for evaluation of aneurysm. She is NPO. Her most recent sodium is 122. Her admitting sodium was 116 on 10 PM on 2023. Patient is alert and awake. She is not nauseous. She denies any confusion weakness or lethargy. Patient remained hemodynamically stable. Her bicarbonate is low and 16. Her creatinine remains within normal limits  OBJECTIVE      CURRENT TEMPERATURE:  Temp: 97.7 °F (36.5 °C)  MAXIMUM TEMPERATURE OVER 24HRS:  Temp (24hrs), Av.1 °F (36.7 °C), Min:97.5 °F (36.4 °C), Max:99 °F (37.2 °C)    CURRENT RESPIRATORY RATE:  Resp: 10  CURRENT PULSE:  Heart Rate: 78  CURRENT BLOOD PRESSURE:  BP: (!) 126/53  24HR BLOOD PRESSURE RANGE:  Systolic (46IMN), QSA:835 , Min:80 , IE   ; Diastolic (28OFR), HGB:55, Min:47, Max:76    24HR INTAKE/OUTPUT:    Intake/Output Summary (Last 24 hours) at 2023 1001  Last data filed at 2023 0730  Gross per 24 hour   Intake 395.39 ml   Output 1105 ml   Net -709.61 ml     WEIGHT :Patient Vitals for the past 96 hrs (Last 3 readings):   Weight   01/10/23 0521 119 lb 11.2 oz (54.3 kg)   23 124 lb (56.2 kg)     PHYSICAL EXAM      GENERAL APPEARANCE: Awake and alert x3   SKIN: Warm to touch  EYES: Yony Orf was  ENT: no thrush no pharyngeal congestion   NECK:   No JVD. No carotid bruits and no carotid lymphadenopathy .   PULMONARY: Bilateral air entry and clear   CADRDIOVASCULAR: S1 and S2 audible no S3   ABDOMEN: soft nontender, bowel sounds present, no organomegaly,  no ascites EXTREMITIES: Right lower extremity and foot was wrapped in a dressing  CURRENT MEDICATIONS      aspirin EC tablet 81 mg, Daily  levothyroxine (SYNTHROID) tablet 75 mcg, Daily  sodium chloride flush 0.9 % injection 5-40 mL, 2 times per day  sodium chloride flush 0.9 % injection 5-40 mL, PRN  0.9 % sodium chloride infusion, PRN  enoxaparin (LOVENOX) injection 40 mg, Daily  ondansetron (ZOFRAN-ODT) disintegrating tablet 4 mg, Q8H PRN   Or  ondansetron (ZOFRAN) injection 4 mg, Q6H PRN  polyethylene glycol (GLYCOLAX) packet 17 g, Daily PRN  acetaminophen (TYLENOL) tablet 650 mg, Q6H PRN   Or  acetaminophen (TYLENOL) suppository 650 mg, Q6H PRN  oxyCODONE-acetaminophen (PERCOCET) 5-325 MG per tablet 1 tablet, Q6H PRN          LABS      CBC:   Recent Labs     01/09/23 2024 01/11/23  0454   WBC 11.1 7.0   RBC 3.80* 3.64*   HGB 11.3* 10.8*   HCT 31.9* 32.1*   MCV 83.9 88.2   MCH 29.7 29.7   MCHC 35.4* 33.6   RDW 13.0 13.9   PLT See Reflexed IPF Result 354   MPV  --  10.2      BMP:   Recent Labs     01/09/23 2008 01/09/23 2024 01/09/23  2300 01/11/23  0143 01/11/23  0317 01/11/23  0454   NA  --  116*   < > 122* 121* 122*   K  --  3.5*  --   --   --  4.1   CL  --  82*  --   --   --  89*   CO2  --  17*  --   --   --  16*   BUN  --  9  --   --   --  6*   CREATININE 0.76 0.71  --   --   --  0.75   GLUCOSE  --  98  --   --   --  76   CALCIUM  --  8.8  --   --   --  8.8    < > = values in this interval not displayed. BNP:No results found for: BNP  PHOSPHORUS:    Recent Labs     01/09/23 2024   PHOS 2.5*     MAGNESIUM:   Recent Labs     01/09/23  2024 01/10/23  0302   MG 1.7 1.5*     ALBUMIN:   Recent Labs     01/09/23 2024   LABALBU 3.8     IRON:    Lab Results   Component Value Date/Time    IRON 43 10/05/2022 12:00 AM     IRON SATURATION:  No results found for: LABIRON  TIBC:    Lab Results   Component Value Date/Time    TIBC 280 10/05/2022 12:00 AM     FERRITIN:    Lab Results   Component Value Date/Time    FERRITIN 67.0 10/05/2022 12:00 AM     JAZMINE: No results found for: JAZMINE    SPEP:   Lab Results   Component Value Date/Time    PROT 7.3 01/09/2023 08:24 PM     RADIOLOGY      Reviewed as available. ASSESSMENT    1. Symptomatic hyponatremia patient was treated with 3% saline.   Last 36 hours her correction is somewhere around 6 mEq/L and patient is completely asymptomatic now.  Her 3% saline was discontinued yesterday.  2.  Hyponatremia most likely due to increase in circulating ADH due to pain due to fracture, underlying SIADH cannot be ruled out  3.  Metabolic acidosis with bicarbonate of 16 respiratory versus metabolic  4.  Confusion and work-up is in progress  PLAN      1.  Fluid restriction 1500 mL a day  2.  Will follow serum sodium levels every 4 hours  3.  We will try a salt tablet if needed for correction of hyponatremia  4.  Patient has a normal cortisol and TSH levels      Please do not hesitate to call with questions.    Electronically signed by Miguel Tang MD on 1/11/2023 at 10:01 AM

## 2023-01-11 NOTE — PLAN OF CARE
Problem: Discharge Planning  Goal: Discharge to home or other facility with appropriate resources  1/11/2023 0154 by Jaky Lobo RN  Outcome: Progressing  1/10/2023 1623 by Catarina Pennington RN  Outcome: Progressing     Problem: Pain  Goal: Verbalizes/displays adequate comfort level or baseline comfort level  1/11/2023 0154 by Jaky Lobo RN  Outcome: Progressing  1/10/2023 1623 by Catarina Pennington RN  Outcome: Progressing     Problem: Confusion  Goal: Confusion, delirium, dementia, or psychosis is improved or at baseline  Description: INTERVENTIONS:  1. Assess for possible contributors to thought disturbance, including medications, impaired vision or hearing, underlying metabolic abnormalities, dehydration, psychiatric diagnoses, and notify attending LIP  2. Meredith high risk fall precautions, as indicated  3. Provide frequent short contacts to provide reality reorientation, refocusing and direction  4. Decrease environmental stimuli, including noise as appropriate  5. Monitor and intervene to maintain adequate nutrition, hydration, elimination, sleep and activity  6. If unable to ensure safety without constant attention obtain sitter and review sitter guidelines with assigned personnel  7. Initiate Psychosocial CNS and Spiritual Care consult, as indicated  1/11/2023 0154 by Jaky Lobo RN  Outcome: Progressing  1/10/2023 1623 by Catarina Pennington RN  Outcome: Progressing     Problem: Safety - Adult  Goal: Free from fall injury  Outcome: Progressing     Problem: ABCDS Injury Assessment  Goal: Absence of physical injury  Outcome: Progressing     Problem: Skin/Tissue Integrity  Goal: Absence of new skin breakdown  Description: 1. Monitor for areas of redness and/or skin breakdown  2. Assess vascular access sites hourly  3. Every 4-6 hours minimum:  Change oxygen saturation probe site  4.   Every 4-6 hours:  If on nasal continuous positive airway pressure, respiratory therapy assess nares and determine need for appliance change or resting period.  Outcome: Progressing

## 2023-01-11 NOTE — PROGRESS NOTES
Critical Care Team - Daily Progress Note      Date and time: 1/11/2023 7:01 AM  Patient's name:  Nora Carolina  Medical Record Number: [id number]  Patient's account/billing number: [de-identified]  Patient's YOB: 1940  Age: [de-identified] y.o. Date of Admission: 1/9/2023  8:01 PM  Length of stay during current admission: 2      Primary Care Physician: Christian Cabrera MD  ICU Attending Physician: Dr. Ravi Ruano Status: Full Code    Reason for ICU admission:   Chief Complaint   Patient presents with    Extremity Weakness    Altered Mental Status       Subjective:     OVERNIGHT EVENTS:           No acute events overnight  Patient remained hemodynamically stable, afebrile. /49 in am  Na 122 in the morning, K 4.1, creatinine normal   Not on any fluids. NPO for now for DSA today  UO 1.24 L, intake 334 cc: total - 964 since admission  Labs showed   Bicarb 16  Glucose 76    TSH 7.06, T4 1.31: on synthroid 75 mcg daily  CBC shows wbc 7, hb 11.3 > 10.8, plateletes 171  CXR yesterday negative  EEG showed moderate to severe encephalopathy   MRI brain showed -   No acute infarct or other acute intracranial process. Suspected 8 mm medially projecting left cavernous segment ICA aneurysm. Nonemergent CTA head imaging recommended. Endovascular is consulted - plan for DSA, NPO overnight    DVT prophylaxis - lovenox    HISTORY OF PRESENT ILLNESS:    Nora Carolina is a [de-identified] y.o. woman w/hx of hypothyroidism, osteopenia, and recent right ankle fracturewho presented to OSH for PT for a right broken ankle and was unable to perform exercises due to flaccid paralysis of the LUE and reduced sensation. She had partial resolution of symptoms by the time of presentation. W/u noted critical hyponatremia, and CTA showed ICA aneurysm. Neurology is seeing for stroke w/u and nephro is following for hyponatremia, started on hypertonic saline.   Patient denies previous episodes of hyponatremia and denies alcohol or tobacco use.    AWAKE & FOLLOWING COMMANDS:  [] No   [x] Yes    CURRENT VENTILATION STATUS:     [] Ventilator  [] BIPAP  [] Nasal Cannula [x] Room Air      IF INTUBATED, ET TUBE MARKING AT LOWER LIP:       cms    SECRETIONS Amount:  [] Small [] Moderate  [] Large  [x] None  Color:     [] White [] Colored  [] Bloody    SEDATION:  RAAS Score:  [] Propofol gtt  [] Versed gtt  [] Ativan gtt   [x] No Sedation    PARALYZED:  [x] No    [] Yes    DIARRHEA:                [x] No                [] Yes  (C. Difficile status: [] positive                                                                                                                       [] negative                                                                                                                     [] pending)    VASOPRESSORS:  [x] No    [] Yes    If yes -   [] Levophed       [] Dopamine     [] Vasopressin       [] Dobutamine  [] Phenylephrine         [] Epinephrine    CENTRAL LINES:     [x] No   [] Yes   (Date of Insertion:   )           If yes -     [] Right IJ     [] Left IJ [] Right Femoral [] Left Femoral                   [] Right Subclavian [] Left Subclavian       WEAVER'S CATHETER:   [x] No   [] Yes  (Date of Insertion:   )     URINE OUTPUT:            [x] Good   [] Low              [] Anuric    REVIEW OF SYSTEMS:  Constitutional: Negative for Fever, chills  Eyes: Negative for visual changes, diplopia  ENT: Negative for mouth sores, sore throat. Cardiovascular: Negative for lightheadedness ,chest pain, palpitations   Respiratory:Negative for Shortness of breath,cough or wheezing. Gastrointestinal: Negative for nausea/vomiting, change in bowel habits, abdominal pain  Genitourinary:Negative for change in bladder habits, dysuria, hematuria.   Musculoskeletal: Negative for joint pain   Neurological: Negative for headache, change in muscle strength numbness/tingling      OBJECTIVE:     VITAL SIGNS:  BP (!) 120/49   Pulse 71   Temp 97.5 °F (36.4 °C) (Oral)   Resp 13   Ht 5' 3\" (1.6 m)   Wt 119 lb 11.2 oz (54.3 kg)   SpO2 100%   BMI 21.20 kg/m²   Tmax over 24 hours:  Temp (24hrs), Av.3 °F (36.8 °C), Min:97.5 °F (36.4 °C), Max:99.1 °F (37.3 °C)      Patient Vitals for the past 8 hrs:   BP Temp Temp src Pulse Resp SpO2   23 0605 (!) 120/49 -- -- 71 13 100 %   23 0505 (!) 80/52 -- -- 74 10 100 %   23 0400 (!) 92/54 97.5 °F (36.4 °C) Oral 78 12 99 %   23 0300 97/64 -- -- 76 11 99 %   23 0200 99/63 97.7 °F (36.5 °C) Oral 75 (!) 9 100 %   23 0100 99/60 -- -- 82 13 100 %   23 0000 (!) 96/58 97.7 °F (36.5 °C) Oral 70 (!) 9 98 %         Intake/Output Summary (Last 24 hours) at 2023 0701  Last data filed at 2023 0600  Gross per 24 hour   Intake 334.02 ml   Output 1248 ml   Net -913.98 ml     Date 23 0000 - 23 2359   Shift 6756-2512 1644-6282 6215-2562 24 Hour Total   INTAKE   Shift Total(mL/kg)       OUTPUT   Urine(mL/kg/hr) 290   290   Shift Total(mL/kg) 290(5.3)   290(5.3)   Weight (kg) 54.3 54.3 54.3 54.3     Wt Readings from Last 3 Encounters:   01/10/23 119 lb 11.2 oz (54.3 kg)   22 124 lb 6.4 oz (56.4 kg)   22 126 lb 12.8 oz (57.5 kg)     Body mass index is 21.2 kg/m². PHYSICAL EXAM:  Constitutional: not on any distress  HEENT: PERRLA, EOMI, sclera clear, anicteric  Respiratory: clear to auscultation, no wheezes or rales and unlabored breathing. Cardiovascular: regular rate and rhythm, normal S1, S2, no murmur noted and 2+ pulses throughout  Abdomen: soft, nontender, nondistended, no masses or organomegaly  NEUROLOGIC: Awake, alert, oriented to name and time. Cranial nerves II-XII are grossly intact. Motor is 5 out of 5 bilaterally. Sensory is intact. Extremities:  peripheral pulses normal, no pedal edema,.       Any additional physical findings:      MEDICATIONS:  Scheduled Meds:   aspirin EC  81 mg Oral Daily    levothyroxine  75 mcg Oral Daily    sodium chloride flush 5-40 mL IntraVENous 2 times per day    enoxaparin  40 mg SubCUTAneous Daily     Continuous Infusions:   sodium chloride       PRN Meds:   sodium chloride flush, 5-40 mL, PRN  sodium chloride, , PRN  ondansetron, 4 mg, Q8H PRN   Or  ondansetron, 4 mg, Q6H PRN  polyethylene glycol, 17 g, Daily PRN  acetaminophen, 650 mg, Q6H PRN   Or  acetaminophen, 650 mg, Q6H PRN  oxyCODONE-acetaminophen, 1 tablet, Q6H PRN        SUPPORT DEVICES: [] Ventilator [] BIPAP  [] Nasal Cannula [] Room Air    VENT SETTINGS (Comprehensive) (if applicable): Additional Respiratory Assessments  Heart Rate: 71  Resp: 13  SpO2: 100 %    ABGs:     No results found for: PHART, PH, NOA7LZA, PCO2, PO2ART, PO2, HHN5ODQ, HCO3, BEART, BE, THGBART, THB, RXF9OVO, L8OMKOBK, O2SAT, FIO2  Lactic Acid: No results found for: LACTA      DATA:  Complete Blood Count:   Recent Labs     01/09/23 2024 01/11/23 0454   WBC 11.1 7.0   HGB 11.3* 10.8*   MCV 83.9 88.2   PLT See Reflexed IPF Result 354   RBC 3.80* 3.64*   HCT 31.9* 32.1*   MCH 29.7 29.7   MCHC 35.4* 33.6   RDW 13.0 13.9   MPV  --  10.2        PT/INR:    Lab Results   Component Value Date/Time    PROTIME 10.8 01/09/2023 08:24 PM    INR 1.0 01/09/2023 08:24 PM     PTT:  No results found for: APTT, PTT    Basal Metabolic Profile:   Recent Labs     01/09/23 2008 01/09/23 2024 01/09/23  2300 01/11/23  0143 01/11/23 0317 01/11/23 0454   NA  --  116*   < > 122* 121* 122*   K  --  3.5*  --   --   --  4.1   BUN  --  9  --   --   --  6*   CREATININE 0.76 0.71  --   --   --  0.75   CL  --  82*  --   --   --  89*   CO2  --  17*  --   --   --  16*    < > = values in this interval not displayed. Magnesium:   Lab Results   Component Value Date/Time    MG 1.5 01/10/2023 03:02 AM    MG 1.7 01/09/2023 08:24 PM     Phosphorus:   Lab Results   Component Value Date/Time    PHOS 2.5 01/09/2023 08:24 PM     S. Calcium:  Recent Labs     01/11/23  0454   CALCIUM 8.8     S.  Ionized Calcium:No results for input(s): IONCA in the last 72 hours. Urinalysis:   Lab Results   Component Value Date/Time    NITRU NEGATIVE 01/09/2023 08:31 PM    COLORU Yellow 01/09/2023 08:31 PM    PHUR 7.5 01/09/2023 08:31 PM    WBCUA None 01/09/2023 08:31 PM    RBCUA 50  01/09/2023 08:31 PM    CLARITYU clear 09/21/2022 10:13 AM    SPECGRAV 1.042 01/09/2023 08:31 PM    LEUKOCYTESUR NEGATIVE 01/09/2023 08:31 PM    UROBILINOGEN Normal 01/09/2023 08:31 PM    BILIRUBINUR NEGATIVE 01/09/2023 08:31 PM    BILIRUBINUR neg 09/21/2022 10:13 AM    BLOODU moderate** 09/21/2022 10:13 AM    GLUCOSEU NEGATIVE 01/09/2023 08:31 PM    KETUA MODERATE 01/09/2023 08:31 PM       CARDIAC ENZYMES: No results for input(s): CKMB, CKMBINDEX, TROPONINI in the last 72 hours. Invalid input(s): CKTOTAL;3  BNP: No results for input(s): BNP in the last 72 hours. LFTS  Recent Labs     01/09/23 2024   ALKPHOS 82   ALT 22   AST 41*   BILITOT 0.5   LABALBU 3.8       AMYLASE/LIPASE/AMMONIA  Recent Labs     01/09/23 2024   LIPASE 51   AMMONIA 25       Last 3 Blood Glucose:   Recent Labs     01/09/23 2024 01/11/23  0454   GLUCOSE 98 76      HgBA1c:  No results found for: LABA1C      TSH:    Lab Results   Component Value Date/Time    TSH 7.06 01/09/2023 08:24 PM     ANEMIA STUDIES  No results for input(s): LABIRON, TIBC, FERRITIN, SFHBZHON40, FOLATE, OCCULTBLD in the last 72 hours.       Cultures during this admission:     Blood cultures:                 [] None drawn      [] Negative             []  Positive (Details:  )  Urine Culture:                   [] None drawn      [] Negative             []  Positive (Details:  )  Sputum Culture:               [] None drawn       [] Negative             []  Positive (Details:  )   Endotracheal aspirate:     [] None drawn       [] Negative             []  Positive (Details:  )        ASSESSMENT:     Principal Problem:    Hyponatremia  Active Problems:    Seizure-like activity (HCC)    Carotid aneurysm, left (HCC)    Altered mental status    Cerebrovascular aneurysm    TIA (transient ischemic attack)    Hypothyroidism    Pure hypercholesterolemia    Vitamin D deficiency    Arthritis    CAD (coronary artery disease)    Osteopenia    History of DVT (deep vein thrombosis)    Chronic renal failure, stage 3b (HCC)  Resolved Problems:    * No resolved hospital problems. *        PLAN:     CNS  Alert oriented x 2 for me  EEG showed moderate to severe encephalopathy   Na 122 in the morning  ICA aneurysm (Suspected 8 mm medially projecting left cavernous segment ICA aneurysm) - endovascular surgery on board - plan for DSA today  On percocet prn q6h for right ankle fracture s/p fixation     CVS  Hemodynamically stable    Respiratory  On room air  MRSA DNA negative    Renal - hyponatremia  S/p hypertonic saline  Sodium in the morning - 122: NPO overnight due to procedure planned for today. Not on any fluids per nephrology: q4 sodium checks  Nephrology following  UO 1.24 L, intake 334 cc: total - 964 since admission    Endocrinology: Hypothyroidism   TSH 7.06, T4 1.31: on synthroid 75 mcg daily  Glucose 76    GI   GI prophylaxis  NPO for procedure today    Hematology  CBC shows wbc 7, hb 11.3 > 10.8, plateletes 600  Dvt prophylaxis - Gold Valle MD, M.D. Department of Internal Medicine/ Critical care  [facility] ([address] [place] [address])             1/11/2023, 7:01 AM    Attending Physician Statement  I have discussed the care of Mckenna Seek, including pertinent history and exam findings with the resident. I have reviewed the key elements of all parts of the encounter with the resident. I have seen and examined the patient with the resident. I agree with the assessment and plan and status of the problem list as documented. I seen the patient during around today, chart reviewed, labs and medications reviewed overnight events noted.   Patient is alert and oriented not confused disoriented to place at times.  Her sodium is 122 this morning. She is hemodynamically stable. She is currently off fluid. No increased urine output was reported. She was on half saline but she is currently off. VBG was 7.3 3/37/35/19 this morning. Will continue follow-up with nephrology sodium is every 4 hour. Angiogram was ordered by neurology/neuro endovascular     Discussed with nursing staff, treatment and plan discussed. Total critical care time caring for this patient with life threatening, unstable organ failure, including direct patient contact, management of life support systems, review of data including imaging and labs, discussions with other team members and physicians at least 27  Min so far today, excluding procedures. Please note that this chart was generated using voice recognition Dragon dictation software. Although every effort was made to ensure the accuracy of this automated transcription, some errors in transcription may have occurred.      Nii Castellon MD  1/11/2023 1:22 PM

## 2023-01-11 NOTE — CARE COORDINATION
Met with pt to complete a PHQ 9 assessment. Pt is alert and oriented. She states that she has been at [facility] since she broke her ankle in Dec.  Pt states that she has been a little down since she has been unable to walk but denies any depression. Patient Health Questionnaire-9 (PHQ-9)    Over the last 2 weeks, how often have you been bothered by any of the following problems? 1. Little Interest or pleasure in doing things? [x] Not at all  [] Several Days  [] More than half the day  []  Nearly every day    2. Feeling down, depressed or hopeless? [] Not at all  [x] Several Days  [] More than half the day  []  Nearly every day    3. Trouble falling or staying asleep, or sleeping too much? [x] Not at all  [] Several Days  [] More than half the day  []  Nearly every day    4. Feeling tired or having little energy? [x] Not at all  [] Several Days  [] More than half the day  []  Nearly every day    5. Poor apettite or overeating? [x] Not at all  [] Several Days  [] More than half the day  []  Nearly every day    6. Feeling bad about yourself-or that you are a failure or have let yourself or your family down? [x] Not at all  [] Several Days  [] More than half the day  []  Nearly every day    7. Trouble concentrating on things, such as reading the newspaper or watching television? [x] Not at all  [] Several Days  [] More than half the day  []  Nearly every day    8. Moving or speaking so slowly that other people could have noticed? Or the opposite-being so fidgety or restless that you have been moving around a lot more than usual?   [x] Not at all  [] Several Days  [] More than half the day  []  Nearly every day    9. Thoughts that you would be better off dead or of hurting yourself in some way?    [x] Not at all  [] Several Days  [] More than half the day  []  Nearly every day    Total Score: 1    If you checked off any problems, how difficult have these problems made it for you to do your work, take care of things at home, or get along with other people?    [x] Not difficult at all  [] Somewhat Difficult  [] Very Difficult  []  Extremely Difficult

## 2023-01-11 NOTE — PROGRESS NOTES
Physician Progress Note      Berenice Wetzel  CSN #:                  [id number]  :                       1940  ADMIT DATE:       2023 8:01 PM  [address] [place] DATE:  RESPONDING  PROVIDER #:        Ron Gaviria          QUERY TEXT:    Pt admitted with hyponatremia. Pt noted to have AMS documented. If possible,   please document in the progress notes and discharge summary if you are   evaluating and / or treating any of the following: The medical record reflects the following:  Risk Factors: hyponatremia  Clinical Indicators: presented with LUE weakness, facial droop and AMS, per   neuro \"Patient does appear encephalopathic and requires repeated stimulation   to arouse\", CT head negative, found to have Na level of 116, down to 113 1/10  Treatment: 3% saline, nephrology consult, Q 2hr Na checks, CT's, pending EEG   and MRI, ICU monitoring    Thank you, Jack Wallace, CDI  email - [email or web address]  cell- [phone]  office hours [id number]  Options provided:  -- Metabolic encephalopathy  -- Toxic metabolic encephalopathy  -- Other - I will add my own diagnosis  -- Disagree - Not applicable / Not valid  -- Disagree - Clinically unable to determine / Unknown  -- Refer to Clinical Documentation Reviewer    PROVIDER RESPONSE TEXT:    This patient has metabolic encephalopathy.     Query created by: Christiana Antunez on 1/10/2023 7:39 AM      Electronically signed by:  Ron Gaviria 1/10/2023 7:30 PM

## 2023-01-11 NOTE — BRIEF OP NOTE
Brief Postoperative Note                    [facility]    NEUROENDOVASCULAR SERVICE: POST-OP NOTE: 1/11/2023    Pt Name: Shwetha Sterling  MRN: [id number]  YOB: 1940  Date of Procedure: 1/11/2023  Primary Care Physician: Calin Arnold MD        Pre-Procedural Diagnosis:Left ICA intracranial cavernous segment aneurysm   Post-Procedural Diagnosis:Same as above       Procedure Performed:Diagnostic Cerebral Angiogram    Surgeon:   Perfecto Fernández MD    Fellow:  Tyra Cortez MD and Sheila Nina MD     Assisting Tech:  [employer]     PRE-PROCEDURAL EXAM:  Prestroke baseline mRS MODIFIED [place] SCORE: 0 - No symptoms at all. Neurological exam performed and unchanged from initial H&P or consult      Anesthesia: IV Moderate Sedation  Complications: none    Intra-Operative EXAM:  Neurological exam performed and unchanged from initial H&P or consult    EBL: < less than 50       Cc            Specimens: Were not Obtained  Contrast:     Visipaque 320 low osmolar 56 Cc             Fluoro: 24.5 min    Findings:  Please see dictated Radiology note for further details  Left ICA wide necked, medially oriented, side wall, cavernous segment aneurysm with measurements neck 7.64 mm, length 10.69 mm, width 6.98 mm, height 8.99 mm   Right fetal PCA   Left subclavian artery origin occlusion         POST-PROCEDURAL EXAM :   Stable neurological Exam  Neurological exam performed and unchanged from initial H&P or consult    Closure:  left [place] 5   F        POST-PROCEDURAL MONITORING : see orders  Disposition: Neuro Stepdown      Recommendations:  Back to Neuro Stepdown  Do not bend right leg for 3 hours. Groin checks per protocol. Peripheral pulse checks per protocol. SBP goal 100-140 mm Hg   Follow up with Sheila Nina MD  2 weeks after discharge and Dr. Ang Daily 3 months after discharge.         MD Perfecto Ryan MD   Pager: [phone]  Stroke, Neurocritical Care & [facility]  [address]  Electronically signed 1/11/2023 at 1:54 PM

## 2023-01-12 ENCOUNTER — ANESTHESIA EVENT (OUTPATIENT)
Dept: INTERVENTIONAL RADIOLOGY/VASCULAR | Age: 83
End: 2023-01-12
Payer: COMMERCIAL

## 2023-01-12 ENCOUNTER — ANESTHESIA (OUTPATIENT)
Dept: INTERVENTIONAL RADIOLOGY/VASCULAR | Age: 83
End: 2023-01-12
Payer: COMMERCIAL

## 2023-01-12 ENCOUNTER — APPOINTMENT (OUTPATIENT)
Dept: GENERAL RADIOLOGY | Age: 83
DRG: 981 | End: 2023-01-12
Payer: COMMERCIAL

## 2023-01-12 ENCOUNTER — APPOINTMENT (OUTPATIENT)
Dept: INTERVENTIONAL RADIOLOGY/VASCULAR | Age: 83
DRG: 981 | End: 2023-01-12
Payer: COMMERCIAL

## 2023-01-12 LAB
ABO/RH: NORMAL
ABSOLUTE EOS #: <0.03 K/UL (ref 0–0.44)
ABSOLUTE IMMATURE GRANULOCYTE: 0.2 K/UL (ref 0–0.3)
ABSOLUTE LYMPH #: 1.82 K/UL (ref 1.1–3.7)
ABSOLUTE MONO #: 1.3 K/UL (ref 0.1–1.2)
ACTIVATED CLOTTING TIME: 126 SEC (ref 79–149)
ACTIVATED CLOTTING TIME: 211 SEC (ref 79–149)
ACTIVATED CLOTTING TIME: 245 SEC (ref 79–149)
ACTIVATED CLOTTING TIME: 254 SEC (ref 79–149)
ANION GAP SERPL CALCULATED.3IONS-SCNC: 12 MMOL/L (ref 9–17)
ANTIBODY SCREEN: NEGATIVE
ARM BAND NUMBER: NORMAL
BASOPHILS # BLD: 0 % (ref 0–2)
BASOPHILS ABSOLUTE: 0.04 K/UL (ref 0–0.2)
BUN BLDV-MCNC: 12 MG/DL (ref 8–23)
CALCIUM SERPL-MCNC: 8.4 MG/DL (ref 8.6–10.4)
CHLORIDE BLD-SCNC: 93 MMOL/L (ref 98–107)
CO2: 19 MMOL/L (ref 20–31)
CREAT SERPL-MCNC: 0.88 MG/DL (ref 0.5–0.9)
EOSINOPHILS RELATIVE PERCENT: 0 % (ref 1–4)
EXPIRATION DATE: NORMAL
GFR SERPL CREATININE-BSD FRML MDRD: >60 ML/MIN/1.73M2
GLUCOSE BLD-MCNC: 109 MG/DL (ref 65–105)
GLUCOSE BLD-MCNC: 94 MG/DL (ref 70–99)
HCT VFR BLD CALC: 29.6 % (ref 36.3–47.1)
HEMOGLOBIN: 9.7 G/DL (ref 11.9–15.1)
IMMATURE GRANULOCYTES: 2 %
LYMPHOCYTES # BLD: 20 % (ref 24–43)
MCH RBC QN AUTO: 29.3 PG (ref 25.2–33.5)
MCHC RBC AUTO-ENTMCNC: 32.8 G/DL (ref 28.4–34.8)
MCV RBC AUTO: 89.4 FL (ref 82.6–102.9)
MONOCYTES # BLD: 14 % (ref 3–12)
NRBC AUTOMATED: 0 PER 100 WBC
PDW BLD-RTO: 14.3 % (ref 11.8–14.4)
PLATELET # BLD: 341 K/UL (ref 138–453)
PMV BLD AUTO: 10 FL (ref 8.1–13.5)
POTASSIUM SERPL-SCNC: 3.9 MMOL/L (ref 3.7–5.3)
RBC # BLD: 3.31 M/UL (ref 3.95–5.11)
SEG NEUTROPHILS: 63 % (ref 36–65)
SEGMENTED NEUTROPHILS ABSOLUTE COUNT: 5.79 K/UL (ref 1.5–8.1)
SODIUM BLD-SCNC: 122 MMOL/L (ref 135–144)
SODIUM BLD-SCNC: 124 MMOL/L (ref 135–144)
SODIUM BLD-SCNC: 125 MMOL/L (ref 135–144)
SODIUM BLD-SCNC: 127 MMOL/L (ref 135–144)
SODIUM BLD-SCNC: 132 MMOL/L (ref 135–144)
VITAMIN D 25-HYDROXY: 26 NG/ML
WBC # BLD: 9.2 K/UL (ref 3.5–11.3)

## 2023-01-12 PROCEDURE — 73610 X-RAY EXAM OF ANKLE: CPT

## 2023-01-12 PROCEDURE — 82306 VITAMIN D 25 HYDROXY: CPT

## 2023-01-12 PROCEDURE — 3700000000 HC ANESTHESIA ATTENDED CARE

## 2023-01-12 PROCEDURE — 6360000004 HC RX CONTRAST MEDICATION: Performed by: PSYCHIATRY & NEUROLOGY

## 2023-01-12 PROCEDURE — 86901 BLOOD TYPING SEROLOGIC RH(D): CPT

## 2023-01-12 PROCEDURE — 2580000003 HC RX 258: Performed by: ANESTHESIOLOGY

## 2023-01-12 PROCEDURE — 2000000000 HC ICU R&B

## 2023-01-12 PROCEDURE — 86900 BLOOD TYPING SEROLOGIC ABO: CPT

## 2023-01-12 PROCEDURE — 7100000000 HC PACU RECOVERY - FIRST 15 MIN

## 2023-01-12 PROCEDURE — C1894 INTRO/SHEATH, NON-LASER: HCPCS

## 2023-01-12 PROCEDURE — 36415 COLL VENOUS BLD VENIPUNCTURE: CPT

## 2023-01-12 PROCEDURE — 2580000003 HC RX 258

## 2023-01-12 PROCEDURE — 6370000000 HC RX 637 (ALT 250 FOR IP)

## 2023-01-12 PROCEDURE — 6360000002 HC RX W HCPCS: Performed by: NURSE ANESTHETIST, CERTIFIED REGISTERED

## 2023-01-12 PROCEDURE — 85025 COMPLETE CBC W/AUTO DIFF WBC: CPT

## 2023-01-12 PROCEDURE — 75898 FOLLOW-UP ANGIOGRAPHY: CPT

## 2023-01-12 PROCEDURE — B3141ZZ FLUOROSCOPY OF LEFT COMMON CAROTID ARTERY USING LOW OSMOLAR CONTRAST: ICD-10-PCS | Performed by: PSYCHIATRY & NEUROLOGY

## 2023-01-12 PROCEDURE — 61624 TCAT PERM OCCLS/EMBOLJ CNS: CPT

## 2023-01-12 PROCEDURE — 6370000000 HC RX 637 (ALT 250 FOR IP): Performed by: STUDENT IN AN ORGANIZED HEALTH CARE EDUCATION/TRAINING PROGRAM

## 2023-01-12 PROCEDURE — C1889 IMPLANT/INSERT DEVICE, NOC: HCPCS

## 2023-01-12 PROCEDURE — 80048 BASIC METABOLIC PNL TOTAL CA: CPT

## 2023-01-12 PROCEDURE — 2709999900 HC NON-CHARGEABLE SUPPLY

## 2023-01-12 PROCEDURE — 36224 PLACE CATH CAROTD ART: CPT

## 2023-01-12 PROCEDURE — 2580000003 HC RX 258: Performed by: NURSE ANESTHETIST, CERTIFIED REGISTERED

## 2023-01-12 PROCEDURE — C1887 CATHETER, GUIDING: HCPCS

## 2023-01-12 PROCEDURE — 2580000003 HC RX 258: Performed by: STUDENT IN AN ORGANIZED HEALTH CARE EDUCATION/TRAINING PROGRAM

## 2023-01-12 PROCEDURE — 85347 COAGULATION TIME ACTIVATED: CPT

## 2023-01-12 PROCEDURE — C2628 CATHETER, OCCLUSION: HCPCS

## 2023-01-12 PROCEDURE — 99232 SBSQ HOSP IP/OBS MODERATE 35: CPT | Performed by: INTERNAL MEDICINE

## 2023-01-12 PROCEDURE — 3700000001 HC ADD 15 MINUTES (ANESTHESIA)

## 2023-01-12 PROCEDURE — 84295 ASSAY OF SERUM SODIUM: CPT

## 2023-01-12 PROCEDURE — 7100000001 HC PACU RECOVERY - ADDTL 15 MIN

## 2023-01-12 PROCEDURE — 03VL3HZ RESTRICTION OF LEFT INTERNAL CAROTID ARTERY WITH INTRALUMINAL DEVICE, FLOW DIVERTER, PERCUTANEOUS APPROACH: ICD-10-PCS | Performed by: PSYCHIATRY & NEUROLOGY

## 2023-01-12 PROCEDURE — 2580000003 HC RX 258: Performed by: PSYCHIATRY & NEUROLOGY

## 2023-01-12 PROCEDURE — C1769 GUIDE WIRE: HCPCS

## 2023-01-12 PROCEDURE — 82947 ASSAY GLUCOSE BLOOD QUANT: CPT

## 2023-01-12 PROCEDURE — 75894 X-RAYS TRANSCATH THERAPY: CPT

## 2023-01-12 PROCEDURE — 86850 RBC ANTIBODY SCREEN: CPT

## 2023-01-12 PROCEDURE — B3171ZZ FLUOROSCOPY OF LEFT INTERNAL CAROTID ARTERY USING LOW OSMOLAR CONTRAST: ICD-10-PCS | Performed by: PSYCHIATRY & NEUROLOGY

## 2023-01-12 PROCEDURE — C1760 CLOSURE DEV, VASC: HCPCS

## 2023-01-12 PROCEDURE — 2500000003 HC RX 250 WO HCPCS: Performed by: NURSE ANESTHETIST, CERTIFIED REGISTERED

## 2023-01-12 RX ORDER — PHENYLEPHRINE HCL IN 0.9% NACL 0.5 MG/5ML
SYRINGE (ML) INTRAVENOUS PRN
Status: DISCONTINUED | OUTPATIENT
Start: 2023-01-12 | End: 2023-01-12 | Stop reason: SDUPTHER

## 2023-01-12 RX ORDER — FENTANYL CITRATE 50 UG/ML
INJECTION, SOLUTION INTRAMUSCULAR; INTRAVENOUS PRN
Status: DISCONTINUED | OUTPATIENT
Start: 2023-01-12 | End: 2023-01-12 | Stop reason: SDUPTHER

## 2023-01-12 RX ORDER — ROCURONIUM BROMIDE 10 MG/ML
INJECTION, SOLUTION INTRAVENOUS PRN
Status: DISCONTINUED | OUTPATIENT
Start: 2023-01-12 | End: 2023-01-12 | Stop reason: SDUPTHER

## 2023-01-12 RX ORDER — SODIUM CHLORIDE 0.9 % (FLUSH) 0.9 %
5-40 SYRINGE (ML) INJECTION PRN
Status: DISCONTINUED | OUTPATIENT
Start: 2023-01-12 | End: 2023-01-17 | Stop reason: HOSPADM

## 2023-01-12 RX ORDER — PROPOFOL 10 MG/ML
INJECTION, EMULSION INTRAVENOUS PRN
Status: DISCONTINUED | OUTPATIENT
Start: 2023-01-12 | End: 2023-01-12 | Stop reason: SDUPTHER

## 2023-01-12 RX ORDER — SODIUM CHLORIDE 9 MG/ML
INJECTION, SOLUTION INTRAVENOUS PRN
Status: DISCONTINUED | OUTPATIENT
Start: 2023-01-12 | End: 2023-01-17 | Stop reason: HOSPADM

## 2023-01-12 RX ORDER — ACETAMINOPHEN 325 MG/1
650 TABLET ORAL EVERY 4 HOURS PRN
Status: DISCONTINUED | OUTPATIENT
Start: 2023-01-12 | End: 2023-01-12 | Stop reason: SDUPTHER

## 2023-01-12 RX ORDER — CHOLECALCIFEROL (VITAMIN D3) 125 MCG
5 CAPSULE ORAL NIGHTLY PRN
Status: DISCONTINUED | OUTPATIENT
Start: 2023-01-12 | End: 2023-01-17 | Stop reason: HOSPADM

## 2023-01-12 RX ORDER — SODIUM CHLORIDE 0.9 % (FLUSH) 0.9 %
5-40 SYRINGE (ML) INJECTION EVERY 12 HOURS SCHEDULED
Status: DISCONTINUED | OUTPATIENT
Start: 2023-01-12 | End: 2023-01-17 | Stop reason: HOSPADM

## 2023-01-12 RX ORDER — DEXAMETHASONE SODIUM PHOSPHATE 10 MG/ML
INJECTION, SOLUTION INTRAMUSCULAR; INTRAVENOUS PRN
Status: DISCONTINUED | OUTPATIENT
Start: 2023-01-12 | End: 2023-01-12 | Stop reason: SDUPTHER

## 2023-01-12 RX ORDER — ONDANSETRON 2 MG/ML
INJECTION INTRAMUSCULAR; INTRAVENOUS PRN
Status: DISCONTINUED | OUTPATIENT
Start: 2023-01-12 | End: 2023-01-12 | Stop reason: SDUPTHER

## 2023-01-12 RX ORDER — SODIUM CHLORIDE 9 MG/ML
INJECTION, SOLUTION INTRAVENOUS CONTINUOUS PRN
Status: DISCONTINUED | OUTPATIENT
Start: 2023-01-12 | End: 2023-01-12 | Stop reason: SDUPTHER

## 2023-01-12 RX ORDER — LIDOCAINE HYDROCHLORIDE 10 MG/ML
INJECTION, SOLUTION INFILTRATION; PERINEURAL PRN
Status: DISCONTINUED | OUTPATIENT
Start: 2023-01-12 | End: 2023-01-12 | Stop reason: SDUPTHER

## 2023-01-12 RX ORDER — PROTAMINE SULFATE 10 MG/ML
INJECTION, SOLUTION INTRAVENOUS PRN
Status: DISCONTINUED | OUTPATIENT
Start: 2023-01-12 | End: 2023-01-12 | Stop reason: SDUPTHER

## 2023-01-12 RX ORDER — SODIUM CHLORIDE, SODIUM LACTATE, POTASSIUM CHLORIDE, CALCIUM CHLORIDE 600; 310; 30; 20 MG/100ML; MG/100ML; MG/100ML; MG/100ML
INJECTION, SOLUTION INTRAVENOUS CONTINUOUS PRN
Status: DISCONTINUED | OUTPATIENT
Start: 2023-01-12 | End: 2023-01-12 | Stop reason: SDUPTHER

## 2023-01-12 RX ORDER — IODIXANOL 320 MG/ML
150 INJECTION, SOLUTION INTRAVASCULAR
Status: COMPLETED | OUTPATIENT
Start: 2023-01-12 | End: 2023-01-12

## 2023-01-12 RX ORDER — ONDANSETRON 2 MG/ML
4 INJECTION INTRAMUSCULAR; INTRAVENOUS EVERY 6 HOURS PRN
Status: DISCONTINUED | OUTPATIENT
Start: 2023-01-12 | End: 2023-01-17 | Stop reason: HOSPADM

## 2023-01-12 RX ORDER — HEPARIN SODIUM 1000 [USP'U]/ML
INJECTION, SOLUTION INTRAVENOUS; SUBCUTANEOUS PRN
Status: DISCONTINUED | OUTPATIENT
Start: 2023-01-12 | End: 2023-01-12 | Stop reason: SDUPTHER

## 2023-01-12 RX ORDER — ONDANSETRON 4 MG/1
4 TABLET, ORALLY DISINTEGRATING ORAL EVERY 8 HOURS PRN
Status: DISCONTINUED | OUTPATIENT
Start: 2023-01-12 | End: 2023-01-17 | Stop reason: HOSPADM

## 2023-01-12 RX ADMIN — ROCURONIUM BROMIDE 10 MG: 10 SOLUTION INTRAVENOUS at 12:48

## 2023-01-12 RX ADMIN — DEXAMETHASONE SODIUM PHOSPHATE 4 MG: 10 INJECTION INTRAMUSCULAR; INTRAVENOUS at 12:39

## 2023-01-12 RX ADMIN — ACETAMINOPHEN 650 MG: 325 TABLET ORAL at 20:48

## 2023-01-12 RX ADMIN — IODIXANOL 83 ML: 320 INJECTION, SOLUTION INTRAVASCULAR at 14:53

## 2023-01-12 RX ADMIN — OXYCODONE HYDROCHLORIDE AND ACETAMINOPHEN 1 TABLET: 5; 325 TABLET ORAL at 23:23

## 2023-01-12 RX ADMIN — ONDANSETRON 4 MG: 2 INJECTION INTRAMUSCULAR; INTRAVENOUS at 14:23

## 2023-01-12 RX ADMIN — SODIUM CHLORIDE, PRESERVATIVE FREE 10 ML: 5 INJECTION INTRAVENOUS at 07:48

## 2023-01-12 RX ADMIN — PHENYLEPHRINE HYDROCHLORIDE 100 MCG/MIN: 10 INJECTION INTRAVENOUS at 12:10

## 2023-01-12 RX ADMIN — SODIUM CHLORIDE, PRESERVATIVE FREE 10 ML: 5 INJECTION INTRAVENOUS at 20:48

## 2023-01-12 RX ADMIN — LIDOCAINE HYDROCHLORIDE 50 MG: 10 INJECTION, SOLUTION INFILTRATION; PERINEURAL at 11:44

## 2023-01-12 RX ADMIN — Medication 100 MCG: at 11:44

## 2023-01-12 RX ADMIN — Medication 5 MG: at 20:48

## 2023-01-12 RX ADMIN — SODIUM CHLORIDE, POTASSIUM CHLORIDE, SODIUM LACTATE AND CALCIUM CHLORIDE: 600; 310; 30; 20 INJECTION, SOLUTION INTRAVENOUS at 11:40

## 2023-01-12 RX ADMIN — PROTAMINE SULFATE 35 MG: 10 INJECTION, SOLUTION INTRAVENOUS at 14:21

## 2023-01-12 RX ADMIN — Medication 81 MG: at 07:50

## 2023-01-12 RX ADMIN — Medication 100 MCG: at 12:09

## 2023-01-12 RX ADMIN — ROCURONIUM BROMIDE 10 MG: 10 SOLUTION INTRAVENOUS at 13:33

## 2023-01-12 RX ADMIN — SODIUM CHLORIDE: 9 INJECTION, SOLUTION INTRAVENOUS at 11:52

## 2023-01-12 RX ADMIN — FENTANYL CITRATE 50 MCG: 50 INJECTION, SOLUTION INTRAMUSCULAR; INTRAVENOUS at 11:44

## 2023-01-12 RX ADMIN — CLOPIDOGREL 75 MG: 75 TABLET, FILM COATED ORAL at 07:49

## 2023-01-12 RX ADMIN — HEPARIN SODIUM 2000 UNITS: 1000 INJECTION INTRAVENOUS; SUBCUTANEOUS at 13:10

## 2023-01-12 RX ADMIN — Medication 100 MCG: at 12:14

## 2023-01-12 RX ADMIN — PROPOFOL 100 MG: 10 INJECTION, EMULSION INTRAVENOUS at 11:44

## 2023-01-12 RX ADMIN — Medication 100 MCG: at 12:07

## 2023-01-12 RX ADMIN — OXYCODONE HYDROCHLORIDE AND ACETAMINOPHEN 1 TABLET: 5; 325 TABLET ORAL at 01:33

## 2023-01-12 RX ADMIN — FENTANYL CITRATE 25 MCG: 50 INJECTION, SOLUTION INTRAMUSCULAR; INTRAVENOUS at 14:35

## 2023-01-12 RX ADMIN — HEPARIN SODIUM 1000 UNITS: 1000 INJECTION INTRAVENOUS; SUBCUTANEOUS at 13:36

## 2023-01-12 RX ADMIN — LEVOTHYROXINE SODIUM 75 MCG: 75 TABLET ORAL at 07:50

## 2023-01-12 RX ADMIN — ROCURONIUM BROMIDE 50 MG: 10 SOLUTION INTRAVENOUS at 11:44

## 2023-01-12 RX ADMIN — Medication 100 MCG: at 12:03

## 2023-01-12 RX ADMIN — SODIUM CHLORIDE: 9 INJECTION, SOLUTION INTRAVENOUS at 21:30

## 2023-01-12 RX ADMIN — SUGAMMADEX 109 MG: 100 INJECTION, SOLUTION INTRAVENOUS at 14:24

## 2023-01-12 RX ADMIN — HEPARIN SODIUM 3800 UNITS: 1000 INJECTION INTRAVENOUS; SUBCUTANEOUS at 12:47

## 2023-01-12 RX ADMIN — SODIUM CHLORIDE, PRESERVATIVE FREE 10 ML: 5 INJECTION INTRAVENOUS at 22:20

## 2023-01-12 ASSESSMENT — PAIN SCALES - GENERAL
PAINLEVEL_OUTOF10: 7
PAINLEVEL_OUTOF10: 8
PAINLEVEL_OUTOF10: 7
PAINLEVEL_OUTOF10: 0

## 2023-01-12 ASSESSMENT — PULMONARY FUNCTION TESTS
PIF_VALUE: 0

## 2023-01-12 ASSESSMENT — PAIN DESCRIPTION - ORIENTATION
ORIENTATION: RIGHT
ORIENTATION: RIGHT

## 2023-01-12 ASSESSMENT — PAIN DESCRIPTION - LOCATION
LOCATION: ANKLE
LOCATION: GROIN;ANKLE

## 2023-01-12 ASSESSMENT — PAIN DESCRIPTION - DESCRIPTORS: DESCRIPTORS: OTHER (COMMENT)

## 2023-01-12 NOTE — H&P
Neuro ICU History & Physical    Patient Name: Jeronimo Salguero  Patient : 1940  Room/Bed: 0115/0115-01  Code Status: Full code  Allergies: Allergies   Allergen Reactions    Doxycycline Other (See Comments)     GI upset. Pcn [Penicillins] Hives    Pneumococcal Vaccines Hives     lg local reaction    Tetanus Toxoids        CHIEF COMPLAINT     Left ICA aneurysm embolization    HPI    History Obtained From: Patient, EMR    The patient is a [de-identified] y.o. female with PMH of HLD, CAD/PVD on aspirin and Lipitor, hypothyroidism, osteopenia, arthritis, history of DVT (not on any anticoagulation at home), CKD stage III, presented from acute rehab after patient was noted to have left upper extremity weakness and numbness. Patient was at the acute rehab due to recent ORIF of the left ankle on . She was initially taken to [facility] ED where she was evaluated for potential stroke. CT head was unremarkable for any acute changes, CTA showed a left ICA aneurysm but no LVO or critical stenosis was seen. Patient was found to have a sodium of 116 initial labs. She was subsequently transferred to [facility] for further work-up and management. Patient was initially evaluated by stroke team and was found to have an NIH of 7 due to altered mentation and decreased responsiveness, no focal numbness or weakness was seen. Patient was initially admitted to medical ICU and underwent treatment for hyponatremia. Sodium continued to gradually improve. Patient underwent DSA on 2023 which confirmed left ICA cavernous segment aneurysm. Neurovascular neurosurgery decided to take patient for embolization of the aneurysm, underwent pipeline device embolization. Postprocedure patient arrived to the floor and was neurologically intact. No focal deficits noted.     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 Gordon Johnson        Social History:   Social History     Socioeconomic History    Marital status:      Spouse name: Not on file    Number of children: 2    Years of education: Not on file    Highest education level: Not on file   Occupational History    Occupation:    Tobacco Use    Smoking status: Former     Packs/day: 0.50     Years: 11.00     Pack years: 5.50     Types: Cigarettes     Quit date: 3/26/2008     Years since quittin.8    Smokeless tobacco: Never   Substance and Sexual Activity    Alcohol use: No    Drug use: No    Sexual activity: Not Currently   Other Topics Concern    Not on file   Social History Narrative    Not on file     Social Determinants of Health     Financial Resource Strain: Low Risk     Difficulty of Paying Living Expenses: Not hard at all   Food Insecurity: No Food Insecurity    Worried About Running Out of Food in the Last Year: Never true    Ran Out of Food in the Last Year: Never true   Transportation Needs: Not on file   Physical Activity: Inactive    Days of Exercise per Week: 0 days    Minutes of Exercise per Session: 0 min   Stress: Not on file   Social Connections: Not on file   Intimate Partner Violence: Not on file   Housing Stability: Not on file       Family History:       Adopted:  Yes Allergies:    Doxycycline, Pcn [penicillins], Pneumococcal vaccines, and Tetanus toxoids    Medications Prior to Admission:    Medications Prior to Admission: vitamin C (ASCORBIC ACID) 500 MG tablet, Take 500 mg by mouth daily  Multiple Vitamins-Minerals (THERAPEUTIC MULTIVITAMIN-MINERALS) tablet, Take 1 tablet by mouth daily  levothyroxine (SYNTHROID) 75 MCG tablet, Take 1 tablet by mouth Daily  aspirin EC 81 MG EC tablet, Take 1 tablet by mouth daily  Calcium Carb-Cholecalciferol 600-800 MG-UNIT TABS, Take 1 tablet by mouth daily  traMADol (ULTRAM) 50 MG tablet, Take 50 mg by mouth 2 times daily as needed.     Current Medications:  Current Facility-Administered Medications: sodium chloride flush 0.9 % injection 5-40 mL, 5-40 mL, IntraVENous, 2 times per day  sodium chloride flush 0.9 % injection 5-40 mL, 5-40 mL, IntraVENous, PRN  0.9 % sodium chloride infusion, , IntraVENous, PRN  sodium chloride flush 0.9 % injection 5-40 mL, 5-40 mL, IntraVENous, 2 times per day  sodium chloride flush 0.9 % injection 5-40 mL, 5-40 mL, IntraVENous, PRN  0.9 % sodium chloride infusion, , IntraVENous, PRN  ondansetron (ZOFRAN-ODT) disintegrating tablet 4 mg, 4 mg, Oral, Q8H PRN **OR** ondansetron (ZOFRAN) injection 4 mg, 4 mg, IntraVENous, Q6H PRN  sodium chloride flush 0.9 % injection 5-40 mL, 5-40 mL, IntraVENous, 2 times per day  sodium chloride flush 0.9 % injection 5-40 mL, 5-40 mL, IntraVENous, PRN  0.9 % sodium chloride infusion, , IntraVENous, PRN  clopidogrel (PLAVIX) tablet 75 mg, 75 mg, Oral, Daily  aspirin EC tablet 81 mg, 81 mg, Oral, Daily  levothyroxine (SYNTHROID) tablet 75 mcg, 75 mcg, Oral, Daily  sodium chloride flush 0.9 % injection 5-40 mL, 5-40 mL, IntraVENous, 2 times per day  sodium chloride flush 0.9 % injection 5-40 mL, 5-40 mL, IntraVENous, PRN  0.9 % sodium chloride infusion, , IntraVENous, PRN  enoxaparin (LOVENOX) injection 40 mg, 40 mg, SubCUTAneous, Daily  polyethylene glycol (GLYCOLAX) packet 17 g, 17 g, Oral, Daily PRN  acetaminophen (TYLENOL) tablet 650 mg, 650 mg, Oral, Q6H PRN **OR** acetaminophen (TYLENOL) suppository 650 mg, 650 mg, Rectal, Q6H PRN  oxyCODONE-acetaminophen (PERCOCET) 5-325 MG per tablet 1 tablet, 1 tablet, Oral, Q6H PRN    REVIEW OF SYSTEMS     CONSTITUTIONAL: negative for fatigue and malaise   EYES: negative for double vision and photophobia    HEENT: negative for tinnitus and sore throat   RESPIRATORY: negative for cough, shortness of breath   CARDIOVASCULAR: negative for chest pain, palpitations, or syncope   GASTROINTESTINAL: negative for abdominal pain, nausea, vomiting, diarrhea, or constipation    GENITOURINARY: negative for incontinence or retention    MUSCULOSKELETAL: negative for neck or back pain, negative for extremity pain   NEUROLOGICAL: Negative for seizures, headaches, weakness, numbness, confusion, aphasia, dysarthria    PSYCHIATRIC: negative for agitation, hallucination, SI/HI   SKIN Negative for spontaneous contusions, rashes, or lesions      PHYSICAL EXAM:     BP (!) 102/50   Pulse 78   Temp 97.9 °F (36.6 °C) (Temporal)   Resp 11   Ht 5' 3\" (1.6 m)   Wt 119 lb 11.2 oz (54.3 kg)   SpO2 100%   BMI 21.20 kg/m²     PHYSICAL EXAM:  CONSTITUTIONAL:  Well developed, well nourished, alert and oriented x 3, in no acute distress. GCS 15. Nontoxic. No dysarthria. No aphasia.    HEAD:  normocephalic, atraumatic    EYES:  PERRLA, EOMI.   ENT:  moist mucous membranes   LUNGS:  Equal air entry bilaterally   CARDIOVASCULAR:  normal s1 / s2   ABDOMEN:  Soft, no rigidity   NECK supple, symmetric, no midline tenderness to palpation    BACK without midline tenderness, step-offs or deformities    EXTREMITIES Normal ROM with no deformities   NEUROLOGIC:  Mental Status:  A & O x3,awake             Cranial Nerves:    cranial nerves II-XII are grossly intact    Motor Exam:    Drift:  absent  Tone:  normal    Motor exam is symmetrical 5 out of 5 all extremities bilaterally    Sensory:    Touch:    Right Upper Extremity:  normal  Left Upper Extremity:  normal  Right Lower Extremity:  normal  Left Lower Extremity:  normal    Deep Tendon Reflexes:    Right Bicep:  2+  Left Bicep:  2+  Right Knee:  2+  Left Knee:  2+    Plantar Response:  Right:  downgoing  Left:  downgoing    Clonus:  N/A  Arteaga's:  N/A    Coordination/Dysmetria:  Heel to Shin:  Right:  normal  Left:  normal  Finger to Nose:   Right:  normal  Left:  normal   Dysdiadochokinesia:  absent    Gait: Deferred   SKIN No obvious ecchymosis, rashes, or lesions        LABS AND IMAGING:     RECENT LABS:  CBC with Differential:    Lab Results   Component Value Date/Time    WBC 9.2 01/12/2023 04:12 AM    RBC 3.31 01/12/2023 04:12 AM    HGB 9.7 01/12/2023 04:12 AM    HCT 29.6 01/12/2023 04:12 AM     01/12/2023 04:12 AM    MCV 89.4 01/12/2023 04:12 AM    MCH 29.3 01/12/2023 04:12 AM    MCHC 32.8 01/12/2023 04:12 AM    RDW 14.3 01/12/2023 04:12 AM    LYMPHOPCT 20 01/12/2023 04:12 AM    LYMPHOPCT 56.1 05/04/2021 12:00 AM    MONOPCT 14 01/12/2023 04:12 AM    MONOPCT 11.7 05/04/2021 12:00 AM    EOSPCT 1.3 05/04/2021 12:00 AM    BASOPCT 0 01/12/2023 04:12 AM    BASOPCT 0.6 05/04/2021 12:00 AM    MONOSABS 1.30 01/12/2023 04:12 AM    MONOSABS 0.7 05/04/2021 12:00 AM    LYMPHSABS 1.82 01/12/2023 04:12 AM    LYMPHSABS 3.1 05/04/2021 12:00 AM    EOSABS <0.03 01/12/2023 04:12 AM    EOSABS 0.1 05/04/2021 12:00 AM    BASOSABS 0.04 01/12/2023 04:12 AM     BMP:    Lab Results   Component Value Date/Time     01/12/2023 07:31 AM    K 3.9 01/12/2023 04:12 AM    CL 93 01/12/2023 04:12 AM    CO2 19 01/12/2023 04:12 AM    BUN 12 01/12/2023 04:12 AM    LABALBU 3.8 01/09/2023 08:24 PM    CREATININE 0.88 01/12/2023 04:12 AM    CALCIUM 8.4 01/12/2023 04:12 AM    LABGLOM >60 01/12/2023 04:12 AM    GLUCOSE 94 01/12/2023 04:12 AM       RADIOLOGY:     XR ANKLE RIGHT (MIN 3 VIEWS)   Final Result   Previous ORIF of lateral and medial malleolar fracture. No hardware   complication. No acute osseous findings. XR CHEST PORTABLE   Final Result   No acute process. Stable chest x-ray. MRI BRAIN WO CONTRAST   Final Result   No acute infarct or other acute intracranial process. Suspected 8 mm medially projecting left cavernous segment ICA aneurysm. Nonemergent CTA head imaging recommended. IR ANGIOGRAM CAROTID C EREBRAL BILATERAL    (Results Pending)   IR ANGIOGRAM CAROTID C EREBRAL BILATERAL    (Results Pending)       Labs and Images reviewed with:    [] Mohammed S. Cherl Frankel, MD    [x] Aranza Navarro MD  [] Emily Azul MD  [] there are no new interval images to review. ASSESSMENT AND PLAN:         ASSESSMENT:     This is a [de-identified] y.o. female who initially presented with hyponatremia and left sided numbness and weakness which had resolved by the time she presented to [facility].  Initial NIH of 7 for decreased consciousness and mild dysmetria. CTA showed left ICA aneurysm and was taken for embolization by endovascular neurosurgery. Hyponatremia has been gradually improving over the last few days. Patient care will be discussed with attending, will reevaluate patient along with attending. PLAN/MEDICAL DECISION MAKING:      NEUROLOGIC:  - MRI brain without contrast: No acute infarct. Suspected 8 mm medially projecting left cavernous segment ICA aneurysm. - DSA 1/11/2023: Left ICA wide necked medially oriented sidewall cavernous segment aneurysm.   - S/p left ICA cavernous segment aneurysm pipeline device embolization  - Continue aspirin 81 mg daily and Plavix 75 mg daily  - -140  - Neuro checks per protocol    CARDIOVASCULAR:  - Goal -140  - Continue telemetry    PULMONARY:  -On room air    RENAL/FLUID/ELECTROLYTE:  - BUN 12/ Creatinine 0.88  - Urine output 518/735  - IVF: None  - Fluid restriction 1500 mL  - Replace electrolytes PRN  - Daily BMP    GI/NUTRITION:  NUTRITION:  ADULT DIET; Regular  - Bowel regimen: GlycoLax as needed  - GI prophylaxis: Not indicated    ID:  -Afebrile  -9.2  - Continue to monitor for fevers  - Daily CBC    HEME:   - H&H 9.7/29.6  - Platelets 936  - Daily CBC    ENDOCRINE:  - Continue to monitor blood glucose, goal <180    OTHER:  - PT/OT/ST     PROPHYLAXIS:  Stress ulcer: Not indicated    DVT PROPHYLAXIS:  - SCD sleeves - Thigh High   - No chemoprophylaxis anticoagulation at this time.     DISPOSITION: Remain in ICU for close monitoring      Petros Lake MD  Neuro Critical Care Service   Pager [phone]  1/12/2023     4:04 PM

## 2023-01-12 NOTE — PLAN OF CARE
Problem: Discharge Planning  Goal: Discharge to home or other facility with appropriate resources  1/12/2023 0318 by Ramez Sanchez RN  Outcome: Progressing  1/11/2023 1816 by Philly Tompkins RN  Outcome: Progressing  Flowsheets (Taken 1/11/2023 0800)  Discharge to home or other facility with appropriate resources: Refer to discharge planning if patient needs post-hospital services based on physician order or complex needs related to functional status, cognitive ability or social support system     Problem: Pain  Goal: Verbalizes/displays adequate comfort level or baseline comfort level  1/12/2023 0318 by Ramez Sanchez RN  Outcome: Progressing  Flowsheets (Taken 1/12/2023 0000)  Verbalizes/displays adequate comfort level or baseline comfort level: Assess pain using appropriate pain scale  1/11/2023 1816 by Philly Tompkins RN  Outcome: Progressing  Flowsheets (Taken 1/11/2023 0800)  Verbalizes/displays adequate comfort level or baseline comfort level: Assess pain using appropriate pain scale     Problem: Confusion  Goal: Confusion, delirium, dementia, or psychosis is improved or at baseline  Description: INTERVENTIONS:  1. Assess for possible contributors to thought disturbance, including medications, impaired vision or hearing, underlying metabolic abnormalities, dehydration, psychiatric diagnoses, and notify attending LIP  2. [place] high risk fall precautions, as indicated  3. Provide frequent short contacts to provide reality reorientation, refocusing and direction  4. Decrease environmental stimuli, including noise as appropriate  5. Monitor and intervene to maintain adequate nutrition, hydration, elimination, sleep and activity  6. If unable to ensure safety without constant attention obtain sitter and review sitter guidelines with assigned personnel  7.  Initiate Psychosocial CNS and Spiritual Care consult, as indicated  1/12/2023 0318 by Ramez Sanchez RN  Outcome: Progressing  1/11/2023 1816 by Rey Bentley Rock Ezekiel RN  Outcome: Progressing  Flowsheets (Taken 1/11/2023 0800)  Effect of thought disturbance (confusion, delirium, dementia, or psychosis) are managed with adequate functional status: Assess for contributors to thought disturbance, including medications, impaired vision or hearing, underlying metabolic abnormalities, dehydration, psychiatric diagnoses, notify LIP     Problem: Safety - Adult  Goal: Free from fall injury  1/12/2023 0318 by Alanis Mahajan RN  Outcome: Progressing  1/11/2023 1816 by Lanny Marcelino RN  Outcome: Progressing  Flowsheets (Taken 1/11/2023 0800)  Free From Fall Injury: Based on caregiver fall risk screen, instruct family/caregiver to ask for assistance with transferring infant if caregiver noted to have fall risk factors     Problem: ABCDS Injury Assessment  Goal: Absence of physical injury  1/12/2023 0318 by Alanis Mahajan RN  Outcome: Progressing  1/11/2023 1816 by Lanny Marcelino RN  Outcome: Progressing  Flowsheets (Taken 1/11/2023 0800)  Absence of Physical Injury: Implement safety measures based on patient assessment     Problem: Skin/Tissue Integrity  Goal: Absence of new skin breakdown  Description: 1. Monitor for areas of redness and/or skin breakdown  2. Assess vascular access sites hourly  3. Every 4-6 hours minimum:  Change oxygen saturation probe site  4. Every 4-6 hours:  If on nasal continuous positive airway pressure, respiratory therapy assess nares and determine need for appliance change or resting period.   1/12/2023 0318 by Alanis Mahajan RN  Outcome: Progressing  1/11/2023 1816 by Lanny Marcelino RN  Outcome: Progressing

## 2023-01-12 NOTE — PROGRESS NOTES
Endovascular Neurosurgery Progress Note    SUBJECTIVE:   S/p diagnostic cerebral angiogram on 1/11/2023. No acute overnight events reported. Patient states that she is doing okay and has no specific complaints. Sodium level continues to gradually improve. Groin healing well without active bleeding, peripheral pulses intact. Getting aneurysm treatment today, loaded with aspirin and Plavix yesterday.    Review of Systems:  CONSTITUTIONAL:  negative for fevers, chills, fatigue and malaise    EYES:  negative for double vision, blurred vision and photophobia     HEENT:  negative for tinnitus, epistaxis and sore throat    RESPIRATORY:  negative for cough, shortness of breath, wheezing    CARDIOVASCULAR:  negative for chest pain, palpitations, syncope, edema    GASTROINTESTINAL:  negative for nausea, vomiting    GENITOURINARY:  negative for incontinence    MUSCULOSKELETAL:  negative for neck or back pain    NEUROLOGICAL:  Negative for weakness and tingling  negative for headaches and dizziness    PSYCHIATRIC:  negative for anxiety      Review of systems otherwise negative.    OBJECTIVE:     Vitals:    01/12/23 0500   BP: (!) 115/51   Pulse: 64   Resp:    Temp:    SpO2: 100%        General:  Gen: normal habitus, NAD  HEENT: NCAT, mucosa moist  Cvs: RRR, S1 S2 normal  Resp: symmetric unlabored breathing  Abd: s/nd/nt  Ext: no edema  Skin: no lesions seen, warm and dry    Neuro:  Gen: awake and alert, oriented x3.   Lang/speech: no aphasia or dysarthria. Follows commands.  CN: PERRL, EOMI, VFF, V1-3 intact, face symmetric, hearing intact, shoulder shrug symmetric, tongue midline  Motor: grossly 5/5 UE and LE b/l  Sense: LT intact in all 4 ext.  Coord: FTN and HTS intact b/l  DTR: deferred  Gait: deferred    NIH Stroke Scale:   1a  Level of consciousness: 0 - alert; keenly responsive   1b. LOC questions:  0 - answers both questions correctly   1c. LOC commands: 0 - performs both tasks correctly   2.  Best Gaze: 0 - normal  3. Visual: 0 - no visual loss   4. Facial Palsy: 1 - minor paralysis (flattened nasolabial fold, asymmetric on smiling)   5a. Motor left arm: 0 - no drift, limb holds 90 (or 45) degrees for full 10 seconds   5b. Motor right arm: 0 - no drift, limb holds 90 (or 45) degrees for full 10 seconds   6a. Motor left le - no drift; leg holds 30 degree position for full 5 seconds   6b  Motor right le - drift; leg falls by the end of the 5 second period but does not hit bed   7. Limb Ataxia: 0 - absent   8. Sensory: 0 - normal; no sensory loss   9. Best Language:  0 - no aphasia, normal   10. Dysarthria: 0 - normal   11. Extinction and Inattention: 0 - no abnormality         Total:   2 (1 point on right leg due to fracture 3 weeks ago)     MRS: 4      LABS:   Reviewed. Lab Results   Component Value Date    HGB 9.7 (L) 2023    WBC 9.2 2023     2023     (L) 2023    BUN 12 2023    CREATININE 0.88 2023    AST 41 (H) 2023    ALT 22 2023    MG 1.5 (L) 01/10/2023    INR 1.0 2023      No results found for: COVID19    RADIOLOGY:   Images were personally reviewed including:    Diagnostic Cerebral Angiogram done on 2023  Left ICA wide necked, medially oriented, side wall, cavernous segment aneurysm with measurements neck 7.64 mm, length 10.69 mm, width 6.98 mm, height 8.99 mm   Right fetal PCA   Left subclavian artery origin occlusion           CTA head done on  2023 in an outside hospital: left ICA cavernous aneurysm 8mm     MRI brain 1/10/2023: no acute changes    ASSESSMENT:   [de-identified]year old woman with CKD stage 3, hypothyroidism, fell 3 weeks ago and broke her right foot, p/w acute left hemiparesis that resolved by the time she is in the ED. 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  MRI brain is neg for stroke  - DSA showed ICA cavernous segment aneurysm  - Groin without active bleeding and intact peripheral pulses    PLAN:   - plan to treat aneurysm today  - groin checks per protocol  - peripheral pulse checks per protocol  - SBP goal 100-140  - continue with aspirin and Plavix  - follow up with Vita Laird MD 2 weeks after discharge and Dr. Lenny Velasco 3 months after discharge    Case discussed with Dr. Zac Mosquera fellow and Dr. Lenny Velasco attending.     Cliff Dewitt, OMS 3   Stroke, [facility]  [id number]  Electronically signed 1/12/2023 at 8:26 AM

## 2023-01-12 NOTE — BRIEF OP NOTE
Brief Postoperative Note                  [facility]    NEUROENDOVASCULAR SERVICE: POST-OP NOTE: 1/12/2023    Pt Name: Mckenna Jefferson  MRN: [id number]  YOB: 1940  Date of Procedure: 1/12/2023  Primary Care Physician: Maria D Mohr MD        Pre-Procedural Diagnosis:Left ICA cavernous segment aneurysm   Post-Procedural Diagnosis:same as above       Procedure Performed: Left ICA cavernous segment aneurysm  Pipeline embolization device  embolization     Surgeon:   Markos Monzon MD    Fellow:  Racheal Mark MD and Farzad Esparza MD     Assisting Tech:  Jane Dewitt    PRE-PROCEDURAL EXAM:  Prestroke baseline mRS MODIFIED WOODROW SCORE: 0 - No symptoms at all. Neurological exam performed and unchanged from initial H&P or consult      Anesthesia: General Anesthesia  Complications: none    Intra-Operative EXAM:  Patient sedated with unchanged limited neurological exam    EBL: < less than 50       Cc            Specimens: Were not Obtained  Contrast:     Visipaque 320 low osmolar 83 Cc             Fluoro: 35.9 min    Findings:  Please see dictated Radiology note for further details  Left ICA medially oriented cavernous segment aneurysm with measurements neck 7.64 mm, length 10.69 mm, width 6.98 mm, height 8.99 mm   The above lesion was treated with 6 fr long sheath, Phenom-27 microcatheter, Pipeline embolization device 5x20mm. Sceptor 4 x11 mm balloon was then used to open the flow diverter. Well apposed flow diverter, Hanane Living score-3         POST-PROCEDURAL EXAM :   Stable neurological Exam  Neurological exam performed and unchanged from initial H&P or consult    Closure:  right [place] 6   F        POST-PROCEDURAL MONITORING : see orders  Disposition: Neuro ICU      Recommendations:  Back to Neuro ICU  Do not bend right leg for 3 hours. Groin checks per protocol. Peripheral pulse checks per protocol.   SBP goal 100-140 mm Hg   C/w ASA and Plavix   Follow up with Jacques Real MD Yudy  2 weeks after discharge and Dr. Javier Hartley 3 months after discharge.         MD Arnoldo Cuellar MD   Pager: [phone]  Stroke, [facility]  [facility] [address]  Electronically signed 1/12/2023 at 2:28 PM

## 2023-01-12 NOTE — PROGRESS NOTES
Critical care team - Resident sign-out to medicine service      Date and time: 1/12/2023 12:22 PM  Patient's name:  Eve Sever  Medical Record Number: [id number]  Patient's account/billing number: [de-identified]  Patient's YOB: 1940  Age: [de-identified] y.o. Date of Admission: 1/9/2023  8:01 PM  Length of stay during current admission: 3    Primary Care Physician: Yves Miles MD    Code Status: Full Code    Mode of physician to physician communication:        [x] Via telephone   [] In person     Date and time of sign-out: 1/12/2023 12:22 PM    Accepting Internal Medicine resident: Dr. Paige Diez    Accepting team's attending: Dr. Alejandrina Alexander    Patient's current ICU Bed:  1784     Patient's assigned bed on floor:  115        [] Med-Surg Monitored [] Step-down       [x] Neuro ICU       [] Psych floor     Reason for ICU admission:     [] Intubated (date ) and Extubated (date )                                   []  Protect airway      [] ARF              [] Other     [] Monitoring more than 1 hour   [] Agressive IVF resuscitation       [] Titrating drips to maintain hemodynamic stability                   []  Levo                [] Vaso               [] Capo                [] other   [] Post arrest   [] CRRT/ CVVH      Others: Hyponatremia    ICU course summary:     Eve Sever is a [de-identified] y.o. woman w/hx of hypothyroidism, osteopenia, and recent right ankle fracturewho presented to OSH for PT for a right broken ankle and was unable to perform exercises due to flaccid paralysis of the LUE and reduced sensation. She had partial resolution of symptoms by the time of presentation. W/u noted critical hyponatremia, and CTA showed ICA aneurysm. Neurology is seeing for stroke w/u and nephro is following for hyponatremia, started on hypertonic saline. Patient denies previous episodes of hyponatremia and denies alcohol or tobacco use. Went for endovascular procedure today.     Procedures during patient's ICU stay: [] CVC (date )         [] HD ( access )           []  EGD/ Colonoscopy   []  EEG   []  Other     Current:     Temp (24hrs), Av °F (36.7 °C), Min:96.8 °F (36 °C), Max:98.8 °F (96.0 °C)    Systolic (65ZVX), VVS:308 , Min:99 , KHS:950     Diastolic (31NSG), NSX:63, Min:44, Max:91      Support device:   [] Ventilator [] BIPAP  [] Nasal Cannula [x] Room Air    Nutrition:    [x] NPO [] Tube Feeding (Specify:) [] TPN  [] PO    Cultures:     Blood cultures:                  [x] None drawn        [] Negative               []  Positive (Details:  )      Urine Culture:                     [x] None drawn        [] Negative               []  Positive (Details:  )       Endotracheal aspirate:       [x] None drawn         [] Negative               []  Positive (Details:  )       Consults:     Consultations:  [] Cardiology   [x] Nephrology      [] Hemo onco   [] GI   [] ID                            [] ENT      []Physiotherapy     Others: Endovascular    Assessment:     Principal Problem:    Hyponatremia  Active Problems:    Seizure-like activity (HCC)    Carotid aneurysm, left (HCC)    Altered mental status    Cerebrovascular aneurysm    TIA (transient ischemic attack)    Hypothyroidism    Pure hypercholesterolemia    Vitamin D deficiency    Arthritis    CAD (coronary artery disease)    Osteopenia    History of DVT (deep vein thrombosis)    Chronic renal failure, stage 3b (HCC)  Resolved Problems:    * No resolved hospital problems.  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 Not on any fluids per nephrology: q4 sodium checks  Nephrology following  UO 1.24 L, intake 334 cc: total - 964 since admission     Endocrinology: Hypothyroidism   TSH 7.06, T4 1.31: on synthroid 75 mcg daily  Glucose 76     GI   GI prophylaxis  NPO for procedure today     Hematology  CBC shows wbc 7, hb 11.3 > 10.8 >9.7, plateletes 075  Dvt prophylaxis - lovenox     Plan for neuro icu after procedure  Dr Eladia Bell wants ortho consult    Discharge planning: Activity: as tolerated  Disposition:   PO intake: Diet NPO Exceptions are: Sips of Water with Meds, Other (Specify); Specify Other Exceptions: give aspirin and plavix with sip of water in morning  [x] NPO [] Tube Feeding (Specify: ) [] TPN  [] PO  Mosquera: No    Above mentioned assessment and plan was discussed by me with the admitting medicine resident. The medicine team assigned to the patient by medicine admitting resident will be following up the patient from now onwards on the floor.      _________________________________  Jil Lees,   Orthopedic Surgery Resident, PGY-1  [address]

## 2023-01-12 NOTE — ANESTHESIA POSTPROCEDURE EVALUATION
Department of Anesthesiology  Postprocedure Note    Patient: Susu Locke  MRN: [id number]  YOB: 1940  Date of evaluation: 1/12/2023      Procedure Summary     Date: 01/12/23 Room / Location: [facility] Special Procedures    Anesthesia Start: 1141 Anesthesia Stop: 1503    Procedure: IR ANGIOGRAM CAROTID CEREBRAL BILATERAL Diagnosis: (stent assisted coil embo of left ICA aneurysm with anesthesia assistance)    Scheduled Providers:  Responsible Provider: Samira Flores MD    Anesthesia Type: general ASA Status: 3          Anesthesia Type: No value filed.    Bhaskar Phase I:      Bhaskar Phase II:        Anesthesia Post Evaluation    Patient location during evaluation: PACU  Patient participation: complete - patient participated  Level of consciousness: awake and alert  Pain score: 1  Airway patency: patent  Nausea & Vomiting: no nausea and no vomiting  Complications: no  Cardiovascular status: hemodynamically stable  Respiratory status: acceptable  Hydration status: euvolemic

## 2023-01-12 NOTE — SEDATION DOCUMENTATION
Pt arrives to neuro IR alert and oriented. NIH 1 for RLE (ankle fxr). Pedal pulses marked. Right pedal by doppler, unable to obtain posterior tib pulse dt bandage/dressing. Mosquera already in place and intact. Anesthesia placed art line and intubated. Continue to closely monitor.

## 2023-01-12 NOTE — PLAN OF CARE
Problem: Discharge Planning  Goal: Discharge to home or other facility with appropriate resources  1/12/2023 1114 by Irene Ricardo RN  Outcome: Progressing  1/12/2023 0318 by Yosvany Galvez RN  Outcome: Progressing     Problem: Pain  Goal: Verbalizes/displays adequate comfort level or baseline comfort level  1/12/2023 1114 by Irene Ricardo RN  Outcome: Progressing  Flowsheets  Taken 1/12/2023 0800 by Irene Ricardo RN  Verbalizes/displays adequate comfort level or baseline comfort level:   Encourage patient to monitor pain and request assistance   Assess pain using appropriate pain scale   Administer analgesics based on type and severity of pain and evaluate response   Implement non-pharmacological measures as appropriate and evaluate response  Taken 1/12/2023 0400 by Yosvany Galvez RN  Verbalizes/displays adequate comfort level or baseline comfort level: Assess pain using appropriate pain scale  1/12/2023 0318 by Yosvany Galvez RN  Outcome: Progressing  Flowsheets (Taken 1/12/2023 0000)  Verbalizes/displays adequate comfort level or baseline comfort level: Assess pain using appropriate pain scale     Problem: Confusion  Goal: Confusion, delirium, dementia, or psychosis is improved or at baseline  Description: INTERVENTIONS:  1. Assess for possible contributors to thought disturbance, including medications, impaired vision or hearing, underlying metabolic abnormalities, dehydration, psychiatric diagnoses, and notify attending LIP  2. [place] high risk fall precautions, as indicated  3. Provide frequent short contacts to provide reality reorientation, refocusing and direction  4. Decrease environmental stimuli, including noise as appropriate  5. Monitor and intervene to maintain adequate nutrition, hydration, elimination, sleep and activity  6. If unable to ensure safety without constant attention obtain sitter and review sitter guidelines with assigned personnel  7.  Initiate Psychosocial CNS and Spiritual Care consult, as indicated  1/12/2023 1114 by Cathy Horton RN  Outcome: Progressing  Flowsheets (Taken 1/12/2023 0800)  Effect of thought disturbance (confusion, delirium, dementia, or psychosis) are managed with adequate functional status:   Assess for contributors to thought disturbance, including medications, impaired vision or hearing, underlying metabolic abnormalities, dehydration, psychiatric diagnoses, notify [facility] high risk fall precautions, as indicated   Provide frequent short contacts to provide reality reorientation, refocusing and direction   Decrease environmental stimuli, including noise as appropriate  1/12/2023 0318 by Ramon Ariza RN  Outcome: Progressing     Problem: Safety - Adult  Goal: Free from fall injury  1/12/2023 1114 by Cathy Horton RN  Outcome: Progressing  1/12/2023 0318 by Ramon Ariza RN  Outcome: Progressing     Problem: ABCDS Injury Assessment  Goal: Absence of physical injury  1/12/2023 1114 by Cathy Horton RN  Outcome: Progressing  1/12/2023 0318 by Ramon Ariza RN  Outcome: Progressing     Problem: Skin/Tissue Integrity  Goal: Absence of new skin breakdown  Description: 1. Monitor for areas of redness and/or skin breakdown  2. Assess vascular access sites hourly  3. Every 4-6 hours minimum:  Change oxygen saturation probe site  4. Every 4-6 hours:  If on nasal continuous positive airway pressure, respiratory therapy assess nares and determine need for appliance change or resting period.   1/12/2023 1114 by Cathy Horton RN  Outcome: Progressing  1/12/2023 0318 by Ramon Ariza RN  Outcome: Progressing

## 2023-01-12 NOTE — SEDATION DOCUMENTATION
Post Procedure Transfer from IR Table  [x] Tubes and Lines intact     Post Procedure Neuro-Checks/NIHSS/Vitals  [x] Completed post procedure  [x] Completed bedside handoff  [x] Frequency ordered  [x] Verbal communication of frequency      Post Procedure Puncture Site Checks  [x] Completed post procedure  [x] Completed bedside handoff  [x] Frequency ordered  [x] Verbal communication of frequency    Post Procedure Pulse  Checks  [x] Completed post procedure  [x] Completed bedside handoff  [x] Frequency ordered  [x] Verbal communication of frequency    Order Set  [x] Post Neuro-Endo Procedure  [] Stroke  [] t-PA     B/P control  [x] Verbal Communication   [x] Order in Care Path    Medication Review  [x] Given during procedure  [x] Current drips/meds/fluids    [x] Physician Notified of All changes in Assessment    Family  [x] Location Post Procedure; Dr Abdi Polo to call and update Marielena Gupta

## 2023-01-12 NOTE — CARE COORDINATION
Transitional planning. Bed Hold at [facility]- needs Pre-cert, will need PT/OT notes. Seizure precautions, NA Q 4 hrs.  Endovascular Neurosurgery- IR today for Angiogram.

## 2023-01-12 NOTE — PROGRESS NOTES
SUBJECTIVE    Patient was seen and examined. Patient was sitting comfortably. She was alert and awake. She voiced no complaint.   This morning her sodium is up to 127 and for the period of 3 days her sodium improved from 116-127 and correction is appropriate  Her bicarbonate improved and now up to 19  Pressure remains under good control    OBJECTIVE      CURRENT TEMPERATURE:  Temp: 97.5 °F (36.4 °C)  MAXIMUM TEMPERATURE OVER 24HRS:  Temp (24hrs), Av.2 °F (36.8 °C), Min:97.5 °F (36.4 °C), Max:98.8 °F (37.1 °C)    CURRENT RESPIRATORY RATE:  Resp: 15  CURRENT PULSE:  Heart Rate: 76  CURRENT BLOOD PRESSURE:  BP: (!) 106/91  24HR BLOOD PRESSURE RANGE:  Systolic (60SWO), GFC:073 , Min:97 , CEH:670   ; Diastolic (23PSO), VCJ:04, Min:42, Max:91    24HR INTAKE/OUTPUT:    Intake/Output Summary (Last 24 hours) at 2023 0955  Last data filed at 2023 0600  Gross per 24 hour   Intake 240 ml   Output 640 ml   Net -400 ml     WEIGHT :Patient Vitals for the past 96 hrs (Last 3 readings):   Weight   01/10/23 0521 119 lb 11.2 oz (54.3 kg)   23 124 lb (56.2 kg)     PHYSICAL EXAM      GENERAL APPEARANCE: Alert and awake  SKIN: Warm to touch  EYES: Pulse reactive to light   ENT: no thrush no pharyngeal congestion   NECK: No JVD or carotid bruit   PULMONARY: Bilateral air entry and clear   CADRDIOVASCULAR: S1 and S2 audible no S3   ABDOMEN: soft nontender, bowel sounds present, no organomegaly,  no ascites EXTREMITIES: Right lower extremity and foot was wrapped in a dressing  CURRENT MEDICATIONS      sodium chloride flush 0.9 % injection 5-40 mL, 2 times per day  sodium chloride flush 0.9 % injection 5-40 mL, PRN  0.9 % sodium chloride infusion, PRN  acetaminophen (TYLENOL) tablet 650 mg, Q4H PRN  ondansetron (ZOFRAN-ODT) disintegrating tablet 4 mg, Q8H PRN   Or  ondansetron (ZOFRAN) injection 4 mg, Q6H PRN  clopidogrel (PLAVIX) tablet 75 mg, Daily  aspirin EC tablet 81 mg, Daily  levothyroxine (SYNTHROID) tablet 75 mcg, Daily  sodium chloride flush 0.9 % injection 5-40 mL, 2 times per day  sodium chloride flush 0.9 % injection 5-40 mL, PRN  0.9 % sodium chloride infusion, PRN  enoxaparin (LOVENOX) injection 40 mg, Daily  ondansetron (ZOFRAN-ODT) disintegrating tablet 4 mg, Q8H PRN   Or  ondansetron (ZOFRAN) injection 4 mg, Q6H PRN  polyethylene glycol (GLYCOLAX) packet 17 g, Daily PRN  acetaminophen (TYLENOL) tablet 650 mg, Q6H PRN   Or  acetaminophen (TYLENOL) suppository 650 mg, Q6H PRN  oxyCODONE-acetaminophen (PERCOCET) 5-325 MG per tablet 1 tablet, Q6H PRN        LABS      CBC:   Recent Labs     01/09/23 2024 01/11/23 0454 01/12/23 0412   WBC 11.1 7.0 9.2   RBC 3.80* 3.64* 3.31*   HGB 11.3* 10.8* 9.7*   HCT 31.9* 32.1* 29.6*   MCV 83.9 88.2 89.4   MCH 29.7 29.7 29.3   MCHC 35.4* 33.6 32.8   RDW 13.0 13.9 14.3   PLT See Reflexed IPF Result 354 341   MPV  --  10.2 10.0      BMP:   Recent Labs     01/09/23 2024 01/09/23 2300 01/11/23 0454 01/11/23  1033 01/11/23 2303 01/12/23 0412 01/12/23  0731   *   < > 122*   < > 122* 124* 127*   K 3.5*  --  4.1  --   --  3.9  --    CL 82*  --  89*  --   --  93*  --    CO2 17*  --  16*  --   --  19*  --    BUN 9  --  6*  --   --  12  --    CREATININE 0.71  --  0.75  --   --  0.88  --    GLUCOSE 98  --  76  --   --  94  --    CALCIUM 8.8  --  8.8  --   --  8.4*  --     < > = values in this interval not displayed.       BNP:No results found for: BNP  PHOSPHORUS:    Recent Labs     01/09/23 2024   PHOS 2.5*     MAGNESIUM:   Recent Labs     01/09/23  2024 01/10/23  0302   MG 1.7 1.5*     ALBUMIN:   Recent Labs     01/09/23 2024   LABALBU 3.8     IRON:    Lab Results   Component Value Date/Time    IRON 43 10/05/2022 12:00 AM     IRON SATURATION:  No results found for: LABIRON  TIBC:    Lab Results   Component Value Date/Time    TIBC 280 10/05/2022 12:00 AM     FERRITIN:    Lab Results   Component Value Date/Time    FERRITIN 67.0 10/05/2022 12:00 AM     JAZMINE: No results found for: JAZMINE    SPEP:   Lab Results   Component Value Date/Time    PROT 7.3 01/09/2023 08:24 PM     RADIOLOGY      Reviewed as available. ASSESSMENT    1. Symptomatic hyponatremia: Improving and now sodium is up to 127. Her urine studies are suggestive of high circulating ADH  2. Hyponatremia most likely due to increase in circulating ADH due to pain due to fracture, underlying SIADH cannot be ruled out. Sodium is improving with conservative management  3. Metabolic acidosis: Improving and now bicarbonate is up to 19 and no anion gap   4. Confusion: Improving  PLAN      1. Continue restriction 1500 mL a day  2. We will change sodium checks to every 6 hours by the end of the day  3. Call if serum sodium is greater than 130.  4. We will follow with you      Please do not hesitate to call with questions.     Electronically signed by Bharath Uribe MD on 1/12/2023 at 9:55 AM

## 2023-01-12 NOTE — PROGRESS NOTES
Critical Care Team - Daily Progress Note      Date and time: 1/12/2023 7:08 AM  Patient's name:  Garrett Serna  Medical Record Number: [id number]  Patient's account/billing number: [de-identified]  Patient's YOB: 1940  Age: [de-identified] y.o. Date of Admission: 1/9/2023  8:01 PM  Length of stay during current admission: 3      Primary Care Physician: Jaja Johnson MD  ICU Attending Physician: Dr. Darryl Rivera Status: Full Code    Reason for ICU admission:   Chief Complaint   Patient presents with    Extremity Weakness    Altered Mental Status       Subjective:     OVERNIGHT EVENTS:           No acute events overnight  Patient underwent DSA > left ICA aneurysm (neck 7.6) 10.6 x 6 x 8.9. left subclavian artery occlusion   Neuro - plan for aneurysm repair today  SBP goal 100-140 mm Hg     Patient remained hemodynamically stable, afebrile  Alert and oriented. Spoke to dr olivera today > plan to go to neuro icu after procedure at 1 pm.   /51 in am  Sodium 124 in am > 127:  cc yesterday  CBC is stable 10.8 > 9.7  Platelets 265    HISTORY OF PRESENT ILLNESS:    Garrett Serna is a [de-identified] y.o. woman w/hx of hypothyroidism, osteopenia, and recent right ankle fracturewho presented to OSH for PT for a right broken ankle and was unable to perform exercises due to flaccid paralysis of the LUE and reduced sensation. She had partial resolution of symptoms by the time of presentation. W/u noted critical hyponatremia, and CTA showed ICA aneurysm. Neurology is seeing for stroke w/u and nephro is following for hyponatremia, started on hypertonic saline. Patient denies previous episodes of hyponatremia and denies alcohol or tobacco use.     AWAKE & FOLLOWING COMMANDS:  [] No   [x] Yes    CURRENT VENTILATION STATUS:     [] Ventilator  [] BIPAP  [] Nasal Cannula [x] Room Air      IF INTUBATED, ET TUBE MARKING AT LOWER LIP:       cms    SECRETIONS Amount:  [] Small [] Moderate  [] Large  [x] None  Color:     [] White [] Colored  [] Bloody    SEDATION:  RAAS Score:  [] Propofol gtt  [] Versed gtt  [] Ativan gtt   [x] No Sedation    PARALYZED:  [x] No    [] Yes    DIARRHEA:                [x] No                [] Yes  (C. Difficile status: [] positive                                                                                                                       [] negative                                                                                                                     [] pending)    VASOPRESSORS:  [x] No    [] Yes    If yes -   [] Levophed       [] Dopamine     [] Vasopressin       [] Dobutamine  [] Phenylephrine         [] Epinephrine    CENTRAL LINES:     [x] No   [] Yes   (Date of Insertion:   )           If yes -     [] Right IJ     [] Left IJ [] Right Femoral [] Left Femoral                   [] Right Subclavian [] Left Subclavian       WEAVER'S CATHETER:   [x] No   [] Yes  (Date of Insertion:   )     URINE OUTPUT:            [x] Good   [] Low              [] Anuric    REVIEW OF SYSTEMS:  Constitutional: Negative for Fever, chills  Eyes: Negative for visual changes, diplopia  ENT: Negative for mouth sores, sore throat. Cardiovascular: Negative for lightheadedness ,chest pain, palpitations   Respiratory:Negative for Shortness of breath,cough or wheezing. Gastrointestinal: Negative for nausea/vomiting, change in bowel habits, abdominal pain  Genitourinary:Negative for change in bladder habits, dysuria, hematuria.   Musculoskeletal: Negative for joint pain   Neurological: Negative for headache, change in muscle strength numbness/tingling      OBJECTIVE:     VITAL SIGNS:  BP (!) 115/51   Pulse 64   Temp 98.1 °F (36.7 °C) (Oral)   Resp 15   Ht 5' 3\" (1.6 m)   Wt 119 lb 11.2 oz (54.3 kg)   SpO2 100%   BMI 21.20 kg/m²   Tmax over 24 hours:  Temp (24hrs), Av.2 °F (36.8 °C), Min:97.5 °F (36.4 °C), Max:98.8 °F (37.1 °C)      Patient Vitals for the past 8 hrs:   BP Temp Temp src Pulse Resp SpO2 01/12/23 0500 (!) 115/51 -- -- 64 -- 100 %   01/12/23 0400 (!) 105/46 98.1 °F (36.7 °C) Oral 65 -- 98 %   01/12/23 0300 (!) 99/44 -- -- 71 -- 98 %   01/12/23 0203 -- -- -- -- 15 --   01/12/23 0200 (!) 114/57 -- -- 76 -- 98 %   01/12/23 0100 (!) 108/55 -- -- 74 -- 97 %   01/12/23 0000 (!) 111/46 98.5 °F (36.9 °C) Oral 79 15 97 %         Intake/Output Summary (Last 24 hours) at 1/12/2023 0708  Last data filed at 1/12/2023 0300  Gross per 24 hour   Intake 518.27 ml   Output 685 ml   Net -166.73 ml     Date 01/12/23 0000 - 01/12/23 2359   Shift 9498-8150 5838-7279 2388-7350 24 Hour Total   INTAKE   Shift Total(mL/kg)       OUTPUT   Urine(mL/kg/hr) 90   90   Shift Total(mL/kg) 90(1.7)   90(1.7)   Weight (kg) 54.3 54.3 54.3 54.3     Wt Readings from Last 3 Encounters:   01/10/23 119 lb 11.2 oz (54.3 kg)   12/07/22 124 lb 6.4 oz (56.4 kg)   09/13/22 126 lb 12.8 oz (57.5 kg)     Body mass index is 21.2 kg/m². PHYSICAL EXAM:  Constitutional: not on any distress  HEENT: PERRLA, EOMI, sclera clear, anicteric  Respiratory: clear to auscultation, no wheezes or rales and unlabored breathing. Cardiovascular: regular rate and rhythm, normal S1, S2, no murmur noted and 2+ pulses throughout  Abdomen: soft, nontender, nondistended, no masses or organomegaly  NEUROLOGIC: Awake, alert, oriented to name and time. Cranial nerves II-XII are grossly intact. Motor is 5 out of 5 bilaterally. Sensory is intact. Extremities:  peripheral pulses normal, no pedal edema,.       Any additional physical findings:      MEDICATIONS:  Scheduled Meds:   sodium chloride flush  5-40 mL IntraVENous 2 times per day    clopidogrel  75 mg Oral Daily    aspirin EC  81 mg Oral Daily    levothyroxine  75 mcg Oral Daily    sodium chloride flush  5-40 mL IntraVENous 2 times per day    enoxaparin  40 mg SubCUTAneous Daily     Continuous Infusions:   sodium chloride      sodium chloride       PRN Meds:   sodium chloride flush, 5-40 mL, PRN  sodium chloride, , PRN  acetaminophen, 650 mg, Q4H PRN  ondansetron, 4 mg, Q8H PRN   Or  ondansetron, 4 mg, Q6H PRN  sodium chloride flush, 5-40 mL, PRN  sodium chloride, , PRN  ondansetron, 4 mg, Q8H PRN   Or  ondansetron, 4 mg, Q6H PRN  polyethylene glycol, 17 g, Daily PRN  acetaminophen, 650 mg, Q6H PRN   Or  acetaminophen, 650 mg, Q6H PRN  oxyCODONE-acetaminophen, 1 tablet, Q6H PRN      SUPPORT DEVICES: [] Ventilator [] BIPAP  [] Nasal Cannula [] Room Air    VENT SETTINGS (Comprehensive) (if applicable): Additional Respiratory Assessments  Heart Rate: 64  Resp: 15  SpO2: 100 %  End Tidal CO2: 0 (%)    ABGs:     Lab Results   Component Value Date/Time    FIO2 INFORMATION NOT PROVIDED 01/11/2023 10:33 AM     Lactic Acid: No results found for: LACTA      DATA:  Complete Blood Count:   Recent Labs     01/09/23 2024 01/11/23 0454 01/12/23 0412   WBC 11.1 7.0 9.2   HGB 11.3* 10.8* 9.7*   MCV 83.9 88.2 89.4   PLT See Reflexed IPF Result 354 341   RBC 3.80* 3.64* 3.31*   HCT 31.9* 32.1* 29.6*   MCH 29.7 29.7 29.3   MCHC 35.4* 33.6 32.8   RDW 13.0 13.9 14.3   MPV  --  10.2 10.0        PT/INR:    Lab Results   Component Value Date/Time    PROTIME 10.8 01/09/2023 08:24 PM    INR 1.0 01/09/2023 08:24 PM     PTT:  No results found for: APTT, PTT    Basal Metabolic Profile:   Recent Labs     01/09/23 2024 01/09/23 2300 01/11/23 0454 01/11/23 1033 01/11/23 1926 01/11/23 2303 01/12/23 0412   *   < > 122*   < > 122* 122* 124*   K 3.5*  --  4.1  --   --   --  3.9   BUN 9  --  6*  --   --   --  12   CREATININE 0.71  --  0.75  --   --   --  0.88   CL 82*  --  89*  --   --   --  93*   CO2 17*  --  16*  --   --   --  19*    < > = values in this interval not displayed.       Magnesium:   Lab Results   Component Value Date/Time    MG 1.5 01/10/2023 03:02 AM    MG 1.7 01/09/2023 08:24 PM     Phosphorus:   Lab Results   Component Value Date/Time    PHOS 2.5 01/09/2023 08:24 PM     S. Calcium:  Recent Labs     01/12/23  0412   CALCIUM 8.4*     S. Ionized Calcium:No results for input(s): IONCA in the last 72 hours. Urinalysis:   Lab Results   Component Value Date/Time    NITRU NEGATIVE 01/09/2023 08:31 PM    COLORU Yellow 01/09/2023 08:31 PM    PHUR 7.5 01/09/2023 08:31 PM    WBCUA None 01/09/2023 08:31 PM    RBCUA 50  01/09/2023 08:31 PM    CLARITYU clear 09/21/2022 10:13 AM    SPECGRAV 1.042 01/09/2023 08:31 PM    LEUKOCYTESUR NEGATIVE 01/09/2023 08:31 PM    UROBILINOGEN Normal 01/09/2023 08:31 PM    BILIRUBINUR NEGATIVE 01/09/2023 08:31 PM    BILIRUBINUR neg 09/21/2022 10:13 AM    BLOODU moderate** 09/21/2022 10:13 AM    GLUCOSEU NEGATIVE 01/09/2023 08:31 PM    KETUA MODERATE 01/09/2023 08:31 PM       CARDIAC ENZYMES: No results for input(s): CKMB, CKMBINDEX, TROPONINI in the last 72 hours. Invalid input(s): CKTOTAL;3  BNP: No results for input(s): BNP in the last 72 hours. LFTS  Recent Labs     01/09/23 2024   ALKPHOS 82   ALT 22   AST 41*   BILITOT 0.5   LABALBU 3.8       AMYLASE/LIPASE/AMMONIA  Recent Labs     01/09/23 2024   LIPASE 51   AMMONIA 25       Last 3 Blood Glucose:   Recent Labs     01/09/23 2024 01/11/23  0454 01/12/23  0412   GLUCOSE 98 76 94      HgBA1c:  No results found for: LABA1C      TSH:    Lab Results   Component Value Date/Time    TSH 7.06 01/09/2023 08:24 PM     ANEMIA STUDIES  No results for input(s): LABIRON, TIBC, FERRITIN, HFUAUKCH21, FOLATE, OCCULTBLD in the last 72 hours.       Cultures during this admission:     Blood cultures:                 [] None drawn      [] Negative             []  Positive (Details:  )  Urine Culture:                   [] None drawn      [] Negative             []  Positive (Details:  )  Sputum Culture:               [] None drawn       [] Negative             []  Positive (Details:  )   Endotracheal aspirate:     [] None drawn       [] Negative             []  Positive (Details:  )        ASSESSMENT:     Principal Problem:    Hyponatremia  Active Problems: Seizure-like activity ([facility] [address]    Carotid aneurysm, left (HCC)    Altered mental status    Cerebrovascular aneurysm    TIA (transient ischemic attack)    Hypothyroidism    Pure hypercholesterolemia    Vitamin D deficiency    Arthritis    CAD (coronary artery disease)    Osteopenia    History of DVT (deep vein thrombosis)    Chronic renal failure, stage 3b (HCC)  Resolved Problems:    * No resolved hospital problems. *        PLAN:     CNS  Alert oriented x 2 for me  EEG showed moderate to severe encephalopathy   Na 127 in the morning  ICA aneurysm (Suspected 8 mm medially projecting left cavernous segment ICA aneurysm) - endovascular surgery on board - plan for Vascular procedure today  On percocet prn q6h for right ankle fracture s/p fixation     CVS  Hemodynamically stable    Respiratory  On room air  MRSA DNA negative    Renal - hyponatremia  S/p hypertonic saline  Sodium in the morning - 127: NPO overnight due to procedure planned for today. Not on any fluids per nephrology: q4 sodium checks  Nephrology following  UO 1.24 L, intake 334 cc: total - 964 since admission    Endocrinology: Hypothyroidism   TSH 7.06, T4 1.31: on synthroid 75 mcg daily  Glucose 76    GI   GI prophylaxis  NPO for procedure today    Hematology  CBC shows wbc 7, hb 11.3 > 10.8 >9.7, plateletes 341  Dvt prophylaxis - lovenox    Plan for neuro icu after procedure  Dr Olivarez Later wants ortho consult    Lovely [place], MD, [place].              Department of Internal Medicine/ Critical care  [facility]             1/12/2023, 7:08 AM      Attending Physician      Patient was not seen by me today as this morning she was taken to neuro endovascular lab for intervention/angio gram by neuro endovascular and plan was to transfer the patient to neuro ICU under neuro critical care team.      Marinell Boeck, MD  1/12/2023 5:54 PM

## 2023-01-12 NOTE — ANESTHESIA PRE PROCEDURE
Department of Anesthesiology  Preprocedure Note       Name:  Jeff Medina   Age:  [de-identified] y.o.  :  1940                                          MRN:  [id number]         Date:  2023      Surgeon: * No surgeons listed *    Procedure: * No procedures listed *    Medications prior to admission:   Prior to Admission medications    Medication Sig Start Date End Date Taking? Authorizing Provider   vitamin C (ASCORBIC ACID) 500 MG tablet Take 500 mg by mouth daily    Historical Provider, MD   Multiple Vitamins-Minerals (THERAPEUTIC MULTIVITAMIN-MINERALS) tablet Take 1 tablet by mouth daily    Historical Provider, MD   levothyroxine (SYNTHROID) 75 MCG tablet Take 1 tablet by mouth Daily 10/11/22   Luis Westfall MD   aspirin EC 81 MG EC tablet Take 1 tablet by mouth daily 21   Luis Westfall MD   Calcium Carb-Cholecalciferol 600-800 MG-UNIT TABS Take 1 tablet by mouth daily    Historical Provider, MD   traMADol (ULTRAM) 50 MG tablet Take 50 mg by mouth 2 times daily as needed.  14   Historical Provider, MD       Current medications:    Current Facility-Administered Medications   Medication Dose Route Frequency Provider Last Rate Last Admin    sodium chloride flush 0.9 % injection 5-40 mL  5-40 mL IntraVENous 2 times per day Radha Azul MD   10 mL at 23 0748    sodium chloride flush 0.9 % injection 5-40 mL  5-40 mL IntraVENous PRN Radha Azul MD        0.9 % sodium chloride infusion   IntraVENous PRN Radha Azul MD        acetaminophen (TYLENOL) tablet 650 mg  650 mg Oral Q4H PRN Radha Azul MD        ondansetron (ZOFRAN-ODT) disintegrating tablet 4 mg  4 mg Oral Q8H PRN Radha Azul MD        Or    ondansetron [facility]) injection 4 mg  4 mg IntraVENous Q6H PRN Radha Azul MD        clopidogrel (PLAVIX) tablet 75 mg  75 mg Oral Daily Cecile Diehl MD   75 mg at 23 0749    aspirin EC tablet 81 mg  81 mg Oral Daily Mariana Vance MD   81 mg at 23 5684    levothyroxine (SYNTHROID) tablet 75 mcg  75 mcg Oral Daily Verito Miramontes MD   75 mcg at 01/12/23 0750    sodium chloride flush 0.9 % injection 5-40 mL  5-40 mL IntraVENous 2 times per day Verito Miramontes MD   10 mL at 01/11/23 2100    sodium chloride flush 0.9 % injection 5-40 mL  5-40 mL IntraVENous PRN Verito Miramontes MD   10 mL at 01/10/23 0836    0.9 % sodium chloride infusion   IntraVENous PRN Verito Miramontes MD        enoxaparin (LOVENOX) injection 40 mg  40 mg SubCUTAneous Daily Verito Miramontes MD   40 mg at 01/10/23 0828    ondansetron (ZOFRAN-ODT) disintegrating tablet 4 mg  4 mg Oral Q8H PRN Verito Miramontes MD        Or    ondansetron [facility]) injection 4 mg  4 mg IntraVENous Q6H PRN Verito Miramontes MD   4 mg at 01/10/23 0552    polyethylene glycol (GLYCOLAX) packet 17 g  17 g Oral Daily PRN Verito Miramontes MD        acetaminophen (TYLENOL) tablet 650 mg  650 mg Oral Q6H PRN Verito Miramontes MD   650 mg at 01/10/23 1141    Or    acetaminophen (TYLENOL) suppository 650 mg  650 mg Rectal Q6H PRN Verito Miramontes MD        oxyCODONE-acetaminophen (PERCOCET) 5-325 MG per tablet 1 tablet  1 tablet Oral Q6H PRN Liset Ricardo DO   1 tablet at 01/12/23 0133       Allergies: Allergies   Allergen Reactions    Doxycycline Other (See Comments)     GI upset.     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    Vitamin D deficiency E55.9    Arthritis M19.90    CAD (coronary artery disease) I25.10    Hearing loss H91.90    Osteopenia M85.80    History of DVT (deep vein thrombosis) Z86.718    Microhematuria R31.29    Chronic renal failure, stage 3b (HCC) N18.32    Hyponatremia E87.1    Seizure-like activity ([address] R56.9    Carotid aneurysm, left ([facility]) I72.0    Altered mental status R41.82    Cerebrovascular aneurysm I67.1    TIA (transient ischemic attack) G45.9       Past Medical History:        Diagnosis Date    Arthritis 10/08/2014    CAD (coronary artery disease) 10/08/2014    Edema 10/08/2014    Hematuria     Hypotension, unspecified 10/08/2014    Pure hypercholesterolemia 10/08/2014    Specified congenital anomalies of hair 10/08/2014    Unspecified hypothyroidism 10/08/2014    Unspecified vitamin D deficiency 10/08/2014       Past Surgical History:        Procedure Laterality Date    CARDIAC SURGERY      cardiac stent     CATARACT REMOVAL WITH IMPLANT Bilateral 2016    Dr Melyssa Jorgensen    COLONOSCOPY  2008    COLONOSCOPY  2018    Dr. Fredy Giles        Social History:    Social History     Tobacco Use    Smoking status: Former     Packs/day: 0.50     Years: 11.00     Pack years: 5.50     Types: Cigarettes     Quit date: 3/26/2008     Years since quittin.8    Smokeless tobacco: Never   Substance Use Topics    Alcohol use: No                                Counseling given: Not Answered      Vital Signs (Current):   Vitals:    23 0700 23 0800 23 0900 23 1000   BP: (!) 113/50 (!) 112/50 (!) 106/91 (!) 115/50   Pulse: 64 63 76 69   Resp: 16 14 15 11   Temp:  97.5 °F (36.4 °C)     TempSrc:  Oral     SpO2: 99% 100% 100% 98%   Weight:       Height:                                                  BP Readings from Last 3 Encounters:   23 (!) 115/50   22 118/60   22 100/62       NPO Status:                                                                                 BMI:   Wt Readings from Last 3 Encounters:   01/10/23 119 lb 11.2 oz (54.3 kg)   22 124 lb 6.4 oz (56.4 kg)   22 126 lb 12.8 oz (57.5 kg)     Body mass index is 21.2 kg/m².     CBC:   Lab Results   Component Value Date/Time    WBC 9.2 2023 04:12 AM    RBC 3.31 2023 04:12 AM    HGB 9.7 2023 04:12 AM    HCT 29.6 01/12/2023 04:12 AM    MCV 89.4 01/12/2023 04:12 AM    RDW 14.3 01/12/2023 04:12 AM     01/12/2023 04:12 AM       CMP:   Lab Results   Component Value Date/Time     01/12/2023 07:31 AM    K 3.9 01/12/2023 04:12 AM    CL 93 01/12/2023 04:12 AM    CO2 19 01/12/2023 04:12 AM    BUN 12 01/12/2023 04:12 AM    CREATININE 0.88 01/12/2023 04:12 AM    LABGLOM >60 01/12/2023 04:12 AM    GLUCOSE 94 01/12/2023 04:12 AM    PROT 7.3 01/09/2023 08:24 PM    CALCIUM 8.4 01/12/2023 04:12 AM    BILITOT 0.5 01/09/2023 08:24 PM    ALKPHOS 82 01/09/2023 08:24 PM    AST 41 01/09/2023 08:24 PM    ALT 22 01/09/2023 08:24 PM       POC Tests:   Recent Labs     01/09/23  2008 01/10/23  2247 01/11/23  0452   POCGLU 104*   < > 84   POCNA 117*  --   --    POCK 3.5  --   --    POCCL 83*  --   --    POCBUN 8  --   --    POCHEMO 13.0  --   --    POCHCT 38  --   --     < > = values in this interval not displayed. Coags:   Lab Results   Component Value Date/Time    PROTIME 10.8 01/09/2023 08:24 PM    INR 1.0 01/09/2023 08:24 PM       HCG (If Applicable): No results found for: PREGTESTUR, PREGSERUM, HCG, HCGQUANT     ABGs: No results found for: PHART, PO2ART, ONI1FGL, UND8EDE, BEART, N9TNHARN     Type & Screen (If Applicable):  No results found for: LABABO, LABRH    Drug/Infectious Status (If Applicable):  No results found for: HIV, HEPCAB    COVID-19 Screening (If Applicable): No results found for: COVID19        Anesthesia Evaluation    Airway: Mallampati: II     Neck ROM: full     Dental:    (+) poor dentition      Pulmonary:Negative Pulmonary ROS breath sounds clear to auscultation                             Cardiovascular:    (+) CAD:, hyperlipidemia        Rhythm: regular  Rate: normal                    Neuro/Psych:   (+) CVA:, TIA,             GI/Hepatic/Renal:   (+) renal disease: CRI,           Endo/Other:    (+) hypothyroidism: arthritis:., electrolyte abnormalities, .                  Abdominal:         (-) obese Vascular:   + DVT, . Other Findings:           Anesthesia Plan      general     ASA 3       Induction: intravenous. arterial line    Anesthetic plan and risks discussed with patient. Use of blood products discussed with patient whom consented to blood products. Plan discussed with CRNA.                     Lala High MD   1/12/2023

## 2023-01-12 NOTE — FLOWSHEET NOTE
1202: Report called to Treasure PEREA on 1B. All questions were answered. Pt to be transferred to 1B following the scheduled procedure in IR, pt has no belongings to be transferred to 1B.     Electronically signed by Rodrigo Acosta RN on 1/12/2023 at 1:41 PM

## 2023-01-13 PROBLEM — E87.8 LOW BICARBONATE: Status: ACTIVE | Noted: 2023-01-13

## 2023-01-13 LAB
ABSOLUTE EOS #: <0.03 K/UL (ref 0–0.44)
ABSOLUTE IMMATURE GRANULOCYTE: 0.16 K/UL (ref 0–0.3)
ABSOLUTE LYMPH #: 1.55 K/UL (ref 1.1–3.7)
ABSOLUTE MONO #: 0.88 K/UL (ref 0.1–1.2)
ANION GAP SERPL CALCULATED.3IONS-SCNC: 12 MMOL/L (ref 9–17)
ANION GAP SERPL CALCULATED.3IONS-SCNC: 12 MMOL/L (ref 9–17)
BASOPHILS # BLD: 0 % (ref 0–2)
BASOPHILS ABSOLUTE: <0.03 K/UL (ref 0–0.2)
BUN BLDV-MCNC: 12 MG/DL (ref 8–23)
BUN BLDV-MCNC: 14 MG/DL (ref 8–23)
CALCIUM SERPL-MCNC: 8.5 MG/DL (ref 8.6–10.4)
CALCIUM SERPL-MCNC: 8.9 MG/DL (ref 8.6–10.4)
CHLORIDE BLD-SCNC: 101 MMOL/L (ref 98–107)
CHLORIDE BLD-SCNC: 98 MMOL/L (ref 98–107)
CO2: 18 MMOL/L (ref 20–31)
CO2: 21 MMOL/L (ref 20–31)
CREAT SERPL-MCNC: 0.74 MG/DL (ref 0.5–0.9)
CREAT SERPL-MCNC: 0.9 MG/DL (ref 0.5–0.9)
EOSINOPHILS RELATIVE PERCENT: 0 % (ref 1–4)
GFR SERPL CREATININE-BSD FRML MDRD: >60 ML/MIN/1.73M2
GFR SERPL CREATININE-BSD FRML MDRD: >60 ML/MIN/1.73M2
GLUCOSE BLD-MCNC: 143 MG/DL (ref 70–99)
GLUCOSE BLD-MCNC: 93 MG/DL (ref 70–99)
HCT VFR BLD CALC: 22.6 % (ref 36.3–47.1)
HEMOGLOBIN: 7.9 G/DL (ref 11.9–15.1)
IMMATURE GRANULOCYTES: 2 %
LYMPHOCYTES # BLD: 17 % (ref 24–43)
MCH RBC QN AUTO: 30.3 PG (ref 25.2–33.5)
MCHC RBC AUTO-ENTMCNC: 35 G/DL (ref 28.4–34.8)
MCV RBC AUTO: 86.6 FL (ref 82.6–102.9)
MONOCYTES # BLD: 9 % (ref 3–12)
NRBC AUTOMATED: 0 PER 100 WBC
PDW BLD-RTO: 14.6 % (ref 11.8–14.4)
PLATELET # BLD: 303 K/UL (ref 138–453)
PMV BLD AUTO: 10.3 FL (ref 8.1–13.5)
POTASSIUM SERPL-SCNC: 3.8 MMOL/L (ref 3.7–5.3)
POTASSIUM SERPL-SCNC: 4 MMOL/L (ref 3.7–5.3)
RBC # BLD: 2.61 M/UL (ref 3.95–5.11)
RBC # BLD: ABNORMAL 10*6/UL
SEG NEUTROPHILS: 72 % (ref 36–65)
SEGMENTED NEUTROPHILS ABSOLUTE COUNT: 6.73 K/UL (ref 1.5–8.1)
SODIUM BLD-SCNC: 131 MMOL/L (ref 135–144)
SODIUM BLD-SCNC: 131 MMOL/L (ref 135–144)
WBC # BLD: 9.3 K/UL (ref 3.5–11.3)

## 2023-01-13 PROCEDURE — 6360000002 HC RX W HCPCS

## 2023-01-13 PROCEDURE — 6370000000 HC RX 637 (ALT 250 FOR IP): Performed by: STUDENT IN AN ORGANIZED HEALTH CARE EDUCATION/TRAINING PROGRAM

## 2023-01-13 PROCEDURE — 36415 COLL VENOUS BLD VENIPUNCTURE: CPT

## 2023-01-13 PROCEDURE — 99232 SBSQ HOSP IP/OBS MODERATE 35: CPT | Performed by: INTERNAL MEDICINE

## 2023-01-13 PROCEDURE — 85025 COMPLETE CBC W/AUTO DIFF WBC: CPT

## 2023-01-13 PROCEDURE — 97535 SELF CARE MNGMENT TRAINING: CPT

## 2023-01-13 PROCEDURE — 2580000003 HC RX 258: Performed by: STUDENT IN AN ORGANIZED HEALTH CARE EDUCATION/TRAINING PROGRAM

## 2023-01-13 PROCEDURE — 6370000000 HC RX 637 (ALT 250 FOR IP)

## 2023-01-13 PROCEDURE — 2580000003 HC RX 258: Performed by: ANESTHESIOLOGY

## 2023-01-13 PROCEDURE — 6370000000 HC RX 637 (ALT 250 FOR IP): Performed by: NURSE PRACTITIONER

## 2023-01-13 PROCEDURE — 97530 THERAPEUTIC ACTIVITIES: CPT

## 2023-01-13 PROCEDURE — 97162 PT EVAL MOD COMPLEX 30 MIN: CPT

## 2023-01-13 PROCEDURE — 97166 OT EVAL MOD COMPLEX 45 MIN: CPT

## 2023-01-13 PROCEDURE — 97116 GAIT TRAINING THERAPY: CPT

## 2023-01-13 PROCEDURE — 99223 1ST HOSP IP/OBS HIGH 75: CPT | Performed by: PSYCHIATRY & NEUROLOGY

## 2023-01-13 PROCEDURE — 92523 SPEECH SOUND LANG COMPREHEN: CPT

## 2023-01-13 PROCEDURE — 80048 BASIC METABOLIC PNL TOTAL CA: CPT

## 2023-01-13 PROCEDURE — 2060000000 HC ICU INTERMEDIATE R&B

## 2023-01-13 RX ORDER — ERGOCALCIFEROL 1.25 MG/1
50000 CAPSULE ORAL WEEKLY
Status: DISCONTINUED | OUTPATIENT
Start: 2023-01-13 | End: 2023-01-17 | Stop reason: HOSPADM

## 2023-01-13 RX ORDER — TRAMADOL HYDROCHLORIDE 50 MG/1
50 TABLET ORAL 2 TIMES DAILY PRN
Status: DISCONTINUED | OUTPATIENT
Start: 2023-01-13 | End: 2023-01-17 | Stop reason: HOSPADM

## 2023-01-13 RX ADMIN — LEVOTHYROXINE SODIUM 75 MCG: 75 TABLET ORAL at 08:16

## 2023-01-13 RX ADMIN — TRAMADOL HYDROCHLORIDE 50 MG: 50 TABLET, COATED ORAL at 19:42

## 2023-01-13 RX ADMIN — SODIUM CHLORIDE, PRESERVATIVE FREE 10 ML: 5 INJECTION INTRAVENOUS at 08:22

## 2023-01-13 RX ADMIN — SODIUM CHLORIDE, PRESERVATIVE FREE 10 ML: 5 INJECTION INTRAVENOUS at 19:38

## 2023-01-13 RX ADMIN — SODIUM CHLORIDE, PRESERVATIVE FREE 10 ML: 5 INJECTION INTRAVENOUS at 08:18

## 2023-01-13 RX ADMIN — ENOXAPARIN SODIUM 40 MG: 100 INJECTION SUBCUTANEOUS at 08:16

## 2023-01-13 RX ADMIN — OXYCODONE HYDROCHLORIDE AND ACETAMINOPHEN 1 TABLET: 5; 325 TABLET ORAL at 05:29

## 2023-01-13 RX ADMIN — ERGOCALCIFEROL 50000 UNITS: 1.25 CAPSULE ORAL at 14:48

## 2023-01-13 RX ADMIN — Medication 5 MG: at 19:42

## 2023-01-13 RX ADMIN — Medication 81 MG: at 08:16

## 2023-01-13 RX ADMIN — CLOPIDOGREL 75 MG: 75 TABLET, FILM COATED ORAL at 08:16

## 2023-01-13 ASSESSMENT — PAIN SCALES - GENERAL
PAINLEVEL_OUTOF10: 4
PAINLEVEL_OUTOF10: 2
PAINLEVEL_OUTOF10: 5

## 2023-01-13 ASSESSMENT — PAIN DESCRIPTION - LOCATION
LOCATION: ANKLE
LOCATION: FOOT

## 2023-01-13 ASSESSMENT — PAIN DESCRIPTION - ORIENTATION
ORIENTATION: RIGHT
ORIENTATION: RIGHT

## 2023-01-13 ASSESSMENT — PAIN DESCRIPTION - DESCRIPTORS: DESCRIPTORS: ACHING

## 2023-01-13 NOTE — PROGRESS NOTES
Endovascular Neurosurgery Progress Note    SUBJECTIVE:   No acute overnight events reported. Patient states she is feeling okay with the exception of a headache that began overnight and visual floaters that she states are a chronic problem. Neurological exam is unchanged from yesterday, and her groin is without bleeding bilaterally. Peripheral pulses intact bilaterally. Blood pressures stable with SBP range 113-137. Review of Systems:  CONSTITUTIONAL:  negative for fevers, chills, fatigue and malaise    EYES:  negative for double vision, blurred vision and photophobia     HEENT:  negative for tinnitus, epistaxis and sore throat    RESPIRATORY:  negative for cough, shortness of breath, wheezing    CARDIOVASCULAR:  negative for chest pain, palpitations, syncope, edema    GASTROINTESTINAL:  negative for nausea, vomiting    GENITOURINARY:  negative for incontinence    MUSCULOSKELETAL:  negative for neck or back pain    NEUROLOGICAL:  Positive for headache, visual floaters that patient states are a chronic problem  Negative for weakness and tingling  negative for dizziness    PSYCHIATRIC:  negative for anxiety      Review of systems otherwise negative. OBJECTIVE:     Vitals:    01/13/23 0700   BP: 122/61   Pulse: 78   Resp: 12   Temp: (P) 98 °F (36.7 °C)   SpO2: 97%        General:  Gen: normal habitus, NAD  HEENT: NCAT, mucosa moist  Cvs: RRR, S1 S2 normal  Resp: symmetric unlabored breathing  Abd: s/nd/nt  Ext: no edema  Skin: no lesions seen, warm and dry     Neuro:  Gen: awake and alert, oriented x3. Lang/speech: no aphasia or dysarthria. Follows commands. CN: PERRL, EOMI, VFF, V1-3 intact, face symmetric, hearing intact, shoulder shrug symmetric, tongue midline  Motor: grossly 5/5 UE and LE b/l  Sense: LT intact in all 4 ext. Coord: FTN and HTS intact b/l  DTR: deferred  Gait: deferred    NIH Stroke Scale:   1a  Level of consciousness: 0 - alert; keenly responsive   1b.  LOC questions:  0 - answers both questions correctly   1c. LOC commands: 0 - performs both tasks correctly   2. Best Gaze: 0 - normal   3. Visual: 0 - no visual loss   4. Facial Palsy: 1 - minor paralysis (flattened nasolabial fold, asymmetric on smiling)   5a. Motor left arm: 0 - no drift, limb holds 90 (or 45) degrees for full 10 seconds   5b. Motor right arm: 0 - no drift, limb holds 90 (or 45) degrees for full 10 seconds   6a. Motor left le - no drift; leg holds 30 degree position for full 5 seconds   6b  Motor right le - no drift; leg holds 30 degree position for full 5 seconds   7. Limb Ataxia: 0 - absent   8. Sensory: 0 - normal; no sensory loss   9. Best Language:  0 - no aphasia, normal   10. Dysarthria: 0 - normal   11. Extinction and Inattention: 0 - no abnormality         Total:   1     LABS:   Reviewed. Lab Results   Component Value Date    HGB 7.9 (L) 2023    WBC 9.3 2023     2023     (L) 2023    BUN 14 2023    CREATININE 0.74 2023    AST 41 (H) 2023    ALT 22 2023    MG 1.5 (L) 01/10/2023    INR 1.0 2023      No results found for: COVID19    RADIOLOGY:   Images were personally reviewed including:     Diagnostic Cerebral Angiogram done on 2023  Left ICA wide necked, medially oriented, side wall, cavernous segment aneurysm with measurements neck 7.64 mm, length 10.69 mm, width 6.98 mm, height 8.99 mm   Right fetal PCA   Left subclavian artery origin occlusion         CTA head done on  2023 in an outside hospital: left ICA cavernous aneurysm 8mm     MRI brain 1/10/2023: no acute changes    ASSESSMENT:   [de-identified]year old woman with CKD stage 3, hypothyroidism, fell 3 weeks ago and broke her right foot, p/w acute left hemiparesis that resolved by the time she is in the ED. 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  MRI brain is neg for stroke  - s/p left ICA cavernous segment aneurysm pipeline embolization on 1/13/2023  - bilateral groin without active bleeding and intact peripheral pulses  - hyponatremia improving  - hemoglobin dropped from 9.7 on 1/12 to 7.9 on 1/13    PLAN:   - s/p left ICA cavernous segment aneurysm pipeline embolization  - groin checks per protocol  - peripheral pulse checks per protocol  - SBP goal 100-140  - continue with aspirin and Plavix  - Bed hold at [facility] needs pre-cert  - follow up with Joana Mak MD 2 weeks after discharge and Dr. Cony Ye 3 months after discharge     Case discussed with Dr. Jada Kimble fellow and Dr. Cony Ye attending.     Brandon Dias, OMS 3   Stroke, [facility]  [id number] [place]  Electronically signed 1/13/2023 at 8:02 AM

## 2023-01-13 NOTE — PROGRESS NOTES
Speech Language Pathology  Facility/Department: [address] NEURO ICU  Initial Speech/Language/Cognitive Assessment    NAME: Mckenna Jefferson  : 1940   MRN: [id number]  ADMISSION DATE: 2023  ADMITTING DIAGNOSIS: has Hypothyroidism; Pure hypercholesterolemia; Specified congenital anomalies of hair; Vitamin D deficiency; Arthritis; CAD (coronary artery disease); Hearing loss; Osteopenia; History of DVT (deep vein thrombosis); Microhematuria; Chronic renal failure, stage 3b ([address] Hyponatremia; Seizure-like activity ([address] Carotid aneurysm, left ([address] Altered mental status; Cerebrovascular aneurysm; TIA (transient ischemic attack); and Low bicarbonate on their problem list.    Date of Eval: 2023   Evaluating Therapist: RAÚL Jones    Primary Complaint: The patient is a [de-identified] y.o. female with PMH of HLD, CAD/PVD on aspirin and Lipitor, hypothyroidism, osteopenia, arthritis, history of DVT (not on any anticoagulation at home), CKD stage III, presented from acute rehab after patient was noted to have left upper extremity weakness and numbness. Patient was at the acute rehab due to recent ORIF of the left ankle on . She was initially taken to [facility] ED where she was evaluated for potential stroke. CT head was unremarkable for any acute changes, CTA showed a left ICA aneurysm but no LVO or critical stenosis was seen. Patient was found to have a sodium of 116 initial labs. She was subsequently transferred to [facility] for further work-up and management. Patient was initially evaluated by stroke team and was found to have an NIH of 7 due to altered mentation and decreased responsiveness, no focal numbness or weakness was seen. Patient was initially admitted to medical ICU and underwent treatment for hyponatremia. Sodium continued to gradually improve. Patient underwent DSA on 2023 which confirmed left ICA cavernous segment aneurysm.   Neurovascular neurosurgery decided to take patient for embolization of the aneurysm, underwent pipeline device embolization. Postprocedure patient arrived to the floor and was neurologically intact. No focal deficits noted. Pain:  Pain Assessment  Pain Assessment: 0-10  Pain Level: 0    Vision/ Hearing  Vision  Vision: Within Functional Limits  Hearing  Hearing: Within functional limits    Assessment:  Pt presents with mild cognitive deficits characterized by difficulties with word associations, immediate recall of 5 units, abstract reasoning and deductive reasoning. Pt. Presents with no dysarthria, no O/M deficits at this time. ST to follow up and provide treatment to address noted deficits. Education provided. Recommendations:  Recommendations  Requires SLP Intervention: Yes  Patient Education Response: Verbalizes understanding  Frequency: 3-5 x week  Further therapy recommended at discharge. Plan:   Speech Therapy Prognosis  Prognosis: Fair  Individuals consulted  Consulted and agree with results and recommendations: Patient;RN    Goals:  Short Term Goals  Goal 1: Pt. will recall 3-5 units without distractions with 90% accuracy. Goal 2: Pt. will utilize memory compensatory strategies to aid in recall. Goal 3: Pt. will complete abstract and deductive reasoning tasks with 90% accuracy. Goal 4: Pt. will generate 8-10 members in a category with 90% accuracy.    Patient/family involved in developing goals and treatment plan: yes    Subjective:     Social/Functional History  Lives With: Son  Active : Yes  Vision  Vision: Within Functional Limits  Hearing  Hearing: Within functional limits           Objective:       Oral Motor   Labial: No impairment  Lingual: No impairment    Motor Speech  Apraxic Characteristics: None  Dysarthric Characteristics: None  Intelligibility: No impairment  Overall Impairment Severity: None    Expression  Primary Mode of Expression: Verbal      Cognition:      Orientation  Overall Orientation Status: Within Normal Limits  Attention  Attention: Within Functional Limits  Memory  Memory: Exceptions to [facility]  Short-term Memory: WFL  Immediate Memory: Mild (3/3, 2/5, 3/3)  Problem Solving  Problem Solving: Exceptions to [facility]  Verbal Reasoning Skills: Mild (Deductive Reasoning: 3/5, Antonyms: 2/3, Word Associations: 3/4)  Sequencing: [facility]  Abstract Reasoning  Abstract Reasoning: Exceptions to [facility]  Divergent Thinking: Mild (+ 6)  Safety/Judgment  Safety/Judgment: Within Functional Limits      Prognosis:  Speech Therapy Prognosis  Prognosis: Fair  Individuals consulted  Consulted and agree with results and recommendations: Patient;RN    Education:  Patient Education Response: Verbalizes understanding          Therapy Time:   Individual Concurrent Group Co-treatment   Time In  1136         Time Out  1146         Minutes  10                 Electronically signed by Quentin Horton M.A.  CCC-SLP on 1/13/2023 at 1:11 PM

## 2023-01-13 NOTE — PROGRESS NOTES
Occupational Therapy  Facility/Department: [address] NEURO ICU  Occupational Therapy Initial Assessment      Name: Susu Locke  : 1940  MRN: [id number]  Date of Service: 2023    Discharge Recommendations:  24 hour supervision or assist, Patient would benefit from continued therapy after discharge  OT Equipment Recommendations  Equipment Needed: No     Patient Diagnosis(es): The primary encounter diagnosis was Altered mental status, unspecified altered mental status type. A diagnosis of Hyponatremia was also pertinent to this visit.  Past Medical History:  has a past medical history of Arthritis, CAD (coronary artery disease), Edema, Hematuria, Hypotension, unspecified, Pure hypercholesterolemia, Specified congenital anomalies of hair, Unspecified hypothyroidism, and Unspecified vitamin D deficiency.  Past Surgical History:  has a past surgical history that includes Cardiac surgery (); Colonoscopy (2008); Cataract removal with implant (Bilateral, 2016); Colonoscopy (2018); and Carotid angioplasty (Bilateral, 2023).      Assessment   Performance deficits / Impairments: Decreased functional mobility ;Decreased endurance;Decreased coordination;Decreased ADL status;Decreased balance;Decreased strength;Decreased cognition;Decreased safe awareness;Decreased high-level IADLs;Decreased posture  Assessment: Pt has deficits at this time with her ability to independently / safely complete daily tasks, balance and mobility, and endurance.  She is also demonstrating impaired // decreased cognition, as evidenced by decreased safety awareness, decreased insight to deficits.  At this time, the Pt has decreased ability to return to PLOF and prior living situation; secondary to her functional deficits will require 24 hours supervision and assist upon DC.  Pt will also benefit from continuation of OT servies (Both acute and Post-acute) to improve functional activity participation and regain  independence. Prognosis: Fair;Good  Decision Making: Medium Complexity  REQUIRES OT FOLLOW-UP: Yes  Activity Tolerance  Activity Tolerance: Patient Tolerated treatment well;Patient limited by fatigue;Patient limited by pain          Plan   Occupational Therapy Plan  Times Per Week: 4-6x/week  (Neuro)  Times Per Day: Once a day  Current Treatment Recommendations: Strengthening, Balance training, Functional mobility training, Endurance training, Neuromuscular re-education, Equipment evaluation, education, & procurement, Patient/Caregiver education & training, Safety education & training, Cognitive reorientation, Self-Care / ADL, Home management training, Cognitive/Perceptual training, Coordination training       Restrictions  Restrictions/Precautions  Restrictions/Precautions: Up as Tolerated, Weight Bearing, Seizure, Surgical protocol, Surgical Protocols, Fall Risk  Required Braces or Orthoses?: No  Lower Extremity Weight Bearing Restrictions  Right Lower Extremity Weight Bearing: Non Weight Bearing  Partial Weight Bearing Percentage Or Pounds: RLE NWB (per Pt, s/p ORIF Ankle)  Left Lower Extremity Weight Bearing: Non Weight Bearing  Position Activity Restriction  Other position/activity restrictions: Restrictions for Pt include:  SBP Goal 100-140 (per Endovascular neurosurgery)  //  RLE NWB (per Pt, s/p ORIF). Subjective   General  Patient assessed for rehabilitation services?: Yes  Diagnosis: [de-identified]year old woman who fell 3 weeks ago, subsequently broke RIGHT ankle, underwent RIGHT ANKLE ORIF on 12/28/2022, was DC'd to SNF for Rehab. Pt does not currently have RLE weight-bearing orders in chart, but reports that she is strict RLE NWB. Pt then returned to ED on 1/9/2023 with Acute Left Hemiparesis that resolved by the time she was in the ED; was found to have hyponatremia of 116 and CTA showed Left ICA Cavernous Aneurysm of 8mm. MRI brain was neg for stroke.   On 1/13/2023 Pt underwent Left ICA Cavernous Segment Aneurysm Pipeline Embolization. Restrictions for Pt include:  SBP Goal 100-140 (per Endovascular neurosurgery)  //  RLE NWB (per Pt, s/p ORIF). Subjective  Subjective: RN approved Pt to be seen for OT // PT Evaluations. General Comment  Comments: Pt was agreeable and cooperative. Pt denied having pain or discomfort, denied dizziness or lightheadedness during activty. Social/Functional History  Social/Functional History  Lives With: Son  Type of Home: Apartment  Home Layout: One level  Home Access: Stairs to enter with rails  Entrance Stairs - Number of Steps: 3 PATRICIA  Entrance Stairs - Rails: Left  Bathroom Shower/Tub: Tub/Shower unit  Bathroom Toilet: Standard  Bathroom Equipment: Grab bars in shower  Bathroom Accessibility: Accessible  Home Equipment:  (sleep on couch)  Receives Help From: Family  ADL Assistance: Independent (prior to initial right LE injury)  Bath: Independent  Dressing: Independent  Grooming: Independent  Feeding: Independent  Toileting: Independent  Homemaking Assistance: Independent  Homemaking Responsibilities: Yes  Meal Prep Responsibility: Secondary  Laundry Responsibility: Secondary  Cleaning Responsibility: Secondary  Bill Paying/Finance Responsibility: Secondary  Shopping Responsibility: Secondary  Dependent Care Responsibility: Secondary  Health Care Management: Secondary  Other (Comment): occasional food delivery, limited due to home location  Ambulation Assistance: Independent  Transfer Assistance: Independent  Active : Yes  Mode of Transportation: Car  Occupation: Retired  Type of Occupation:   Additional Comments: Pt report prior to hospitalization / fall pt was independent with all care and mobilty      Objective   Comment: At rest, SpO2 maintained >92%.   With activity, SpO2 (1 instance) desaturated to 87-88% ( range), Pt was immediately returned to sitting EOB, guided through diaphragmatic breathing where SpO2 returned >90% (RA) and HR <110.  RN notified. Pt also notifed to hold breath with activity // standing d/t anxiety. Observation/Palpation  Posture: Good  Observation: Pt able to sit with erect posture  Safety Devices  Type of Devices: All fall risk precautions in place;Call light within reach;Gait belt;Nurse notified; Left in bed  Restraints  Restraints Initially in Place: No    Bed Mobility Training  Bed Mobility Training: Yes  Overall Level of Assistance: Supervision (HOB flat, no use of bedrail)  Interventions: Verbal cues; Visual cues (Min Cues task initiation & sequencing // redirection to task)  Supine to Sit: Supervision  Sit to Supine: Supervision  Scooting: Supervision    Balance  Sitting: Intact (Supervision Assist dynamic sitting balance EOB)  Standing: With support (Using RW. Min X2 Sit to stand // stand to sit. Pt able to maintain RLE NWB (using RW), stood x1 trial, x4 mins; demo'd slight shuffle c Left step (high anxiety). )  Transfer Training  Transfer Training: Yes  Overall Level of Assistance: Minimum assistance;Assist X2;Additional time; Adaptive equipment (Using RW. Min X2 Sit to stand // stand to sit. Pt able to maintain RLE NWB (using RW), stood x1 trial, x4 mins; demo'd slight shuffle c Left step (high anxiety). )  Interventions: Visual cues; Verbal cues; Tactile cues; Safety awareness training (Mod Cues for task initiation & sequencing // safety with DME // integration of Ax pacing)  Sit to Stand: Minimum assistance;Assist X2;Additional time; Adaptive equipment  Stand to Sit: Adaptive equipment; Additional time;Assist X2;Minimum assistance  Stand Pivot Transfers: Other (comment) (Unable to complete)  Bed to Chair: Other (comment) (Unable to complete)    AROM: Within functional limits  PROM: Within functional limits  Strength: Generally decreased, functional (BUE Strength (4-/5))  Coordination: Within functional limits  Tone: Normal  Sensation: Intact    ADL  Feeding: Independent  Feeding Skilled Clinical Factors:  No use of AE. Grooming: Supervision;Verbal cueing  Grooming Skilled Clinical Factors: Sitting EOB while participating in hand and facial hygiene (Supervision Assist, VCs). UE Dressing: Stand by assistance;Verbal cueing; Increased time to complete  UE Dressing Skilled Clinical Factors: Sitting EOB, Pt participated in UBD to don gown like a robe and manage medical lines. Mod Cues for technique and integration of EC // Ax pacing. LE Dressing: Minimal assistance; Increased time to complete;Verbal cueing  LE Dressing Skilled Clinical Factors: Supine with HOB elevated in bed in order to don underwear and pants using adapted // modified techniques and while maintaining RLE NWB. Pt required Min Assist and Max Cues, significant increase in time (75% carryover of RLE NWB). Activity Tolerance  Activity Tolerance: Patient tolerated evaluation without incident  Activity Tolerance Comments: Pt intervention required extra time and effort pt easily fatiged with noted increased HR during activity. Vision  Vision: Within Functional Limits  Hearing  Hearing: Within functional limits    Cognition  Overall Cognitive Status: Exceptions  Following Commands: Follows multistep commands with increased time; Follows multistep commands with repitition  Attention Span: Attends with cues to redirect  Initiation: Requires cues for some  Sequencing: Requires cues for some  Cognition Comment: Pt is easily distracted and perseverates on RLE NWB and maintenance of RLE NWB. She is highly anxious re: consequences of not maintaining RLE NWB (requiring frequent cues,  coaxing and encouragement) Pt is pleasent and cooperative able to make functional needs known. Orientation  Overall Orientation Status: Within Functional Limits  Orientation Level: Oriented X4    Education Given To: Patient; Family;Staff  Education Provided: Role of Therapy;Plan of Care;Family Education;Precautions; Equipment;ADL Adaptive Strategies;Transfer Training; Fall Prevention Strategies; Energy Conservation  Education Method: Demonstration;Verbal  Barriers to Learning: Cognition  Education Outcome: Verbalized understanding;Demonstrated understanding;Continued education needed      AM-PAC Score  AM-PAC Inpatient Daily Activity Raw Score: 17 (01/13/23 1355)  AM-PAC Inpatient ADL T-Scale Score : 37.26 (01/13/23 1355)  ADL Inpatient CMS 0-100% Score: 50.11 (01/13/23 1355)  ADL Inpatient CMS G-Code Modifier : CK (01/13/23 1355)      Goals  Short Term Goals  Time Frame for Short Term Goals: By Discharge -  Short Term Goal 1: Pt will demo Mod I and Good Integration of EC // Ax pacing during UB ADLs. Short Term Goal 2: Pt will demo Mod I and Good Integration of AE // DME during LB ADLs. Short Term Goal 3: Pt will participate in ADL & Bathroom Transfers with SBA and Fair+ Carryover of RLE NWB. Short Term Goal 4: Pt will participate in Short Distance in-room mobility with CGA and Correct Use of AD // DME.        Therapy Time   Individual Concurrent Group Co-treatment   Time In 2799         Time Out 1126         Minutes 39         Timed Code Treatment Minutes: 13 Minutes (ADL)    MODESTO Lee, OTR/L

## 2023-01-13 NOTE — PLAN OF CARE
Problem: Discharge Planning  Goal: Discharge to home or other facility with appropriate resources  Outcome: Progressing     Problem: Pain  Goal: Verbalizes/displays adequate comfort level or baseline comfort level  Outcome: Progressing     Problem: Confusion  Goal: Confusion, delirium, dementia, or psychosis is improved or at baseline  Description: INTERVENTIONS:  1. Assess for possible contributors to thought disturbance, including medications, impaired vision or hearing, underlying metabolic abnormalities, dehydration, psychiatric diagnoses, and notify attending LIP  2. [place] high risk fall precautions, as indicated  3. Provide frequent short contacts to provide reality reorientation, refocusing and direction  4. Decrease environmental stimuli, including noise as appropriate  5. Monitor and intervene to maintain adequate nutrition, hydration, elimination, sleep and activity  6. If unable to ensure safety without constant attention obtain sitter and review sitter guidelines with assigned personnel  7. Initiate Psychosocial CNS and Spiritual Care consult, as indicated  Outcome: Progressing     Problem: Safety - Adult  Goal: Free from fall injury  Outcome: Progressing     Problem: ABCDS Injury Assessment  Goal: Absence of physical injury  Outcome: Progressing     Problem: Skin/Tissue Integrity  Goal: Absence of new skin breakdown  Description: 1.  Monitor for areas of redness and/or skin breakdown  2.  Assess vascular access sites hourly  3.  Every 4-6 hours minimum:  Change oxygen saturation probe site  4.  Every 4-6 hours:  If on nasal continuous positive airway pressure, respiratory therapy assess nares and determine need for appliance change or resting period.  Outcome: Progressing

## 2023-01-13 NOTE — CARE COORDINATION
CM called and spoke with Senait Cowan from [facility] and she states that she is still waiting on clinical documentation to add to precert. CM faxed available information. Still waiting on OT note before precert can be processed. [phone]-Speech Therapy note faxed to [facility].

## 2023-01-13 NOTE — PROGRESS NOTES
Neuro ICU Progress Note    Patient Name: Jaimie Rowe  Patient : 1940  Room/Bed: 0115/0115-01  Code Status: Full code  Allergies: Allergies   Allergen Reactions    Doxycycline Other (See Comments)     GI upset. Pcn [Penicillins] Hives    Pneumococcal Vaccines Hives     lg local reaction    Tetanus Toxoids        CHIEF COMPLAINT     Left ICA aneurysm embolization    HPI    History Obtained From: Patient, EMR    The patient is a [de-identified] y.o. female with PMH of HLD, CAD/PVD on aspirin and Lipitor, hypothyroidism, osteopenia, arthritis, history of DVT (not on any anticoagulation at home), CKD stage III, presented from acute rehab after patient was noted to have left upper extremity weakness and numbness. Patient was at the acute rehab due to recent ORIF of the left ankle on . She was initially taken to [facility] ED where she was evaluated for potential stroke. CT head was unremarkable for any acute changes, CTA showed a left ICA aneurysm but no LVO or critical stenosis was seen. Patient was found to have a sodium of 116 initial labs. She was subsequently transferred to [place] for further work-up and management. Patient was initially evaluated by stroke team and was found to have an NIH of 7 due to altered mentation and decreased responsiveness, no focal numbness or weakness was seen. Patient was initially admitted to medical ICU and underwent treatment for hyponatremia. Sodium continued to gradually improve. Patient underwent DSA on 2023 which confirmed left ICA cavernous segment aneurysm. Neurovascular neurosurgery decided to take patient for embolization of the aneurysm, underwent pipeline device embolization. Postprocedure patient arrived to the floor and was neurologically intact. No focal deficits noted. Hospital course:    2023: Seen postop, stable. Neurological exam intact. Last 24 hours:   No acute events overnight.   Hemodynamically stable. Artline has been positional overnight and has not been functioning well. Blood pressure cuff showing blood pressure within goal 100-140. Decreased urine output overnight. Sodium improving. 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 Hola Boone        Social History:   Social History     Socioeconomic History    Marital status:       Spouse name: Not on file    Number of children: 2    Years of education: Not on file    Highest education level: Not on file   Occupational History    Occupation:    Tobacco Use    Smoking status: Former     Packs/day: 0.50     Years: 11.00     Pack years: 5.50     Types: Cigarettes     Quit date: 3/26/2008     Years since quittin.8    Smokeless tobacco: Never   Substance and Sexual Activity    Alcohol use: No    Drug use: No    Sexual activity: Not Currently   Other Topics Concern    Not on file   Social History Narrative    Not on file     Social Determinants of Health     Financial Resource Strain: Low Risk     Difficulty of Paying Living Expenses: Not hard at all   Food Insecurity: No Food Insecurity    Worried About [address] in the Last Year: Never true    [address] in the Last Year: Never true   Transportation Needs: Not on file   Physical Activity: Inactive    Days of Exercise per Week: 0 days    Minutes of Exercise per Session: 0 min   Stress: Not on file   Social Connections: Not on file   Intimate Partner Violence: Not on file   Housing Stability: Not on file       Family History:       Adopted: Yes       Allergies:    Doxycycline, Pcn [penicillins], Pneumococcal vaccines, and Tetanus toxoids    Medications Prior to Admission:    Medications Prior to Admission: vitamin C (ASCORBIC ACID) 500 MG tablet, Take 500 mg by mouth daily  Multiple Vitamins-Minerals (THERAPEUTIC MULTIVITAMIN-MINERALS) tablet, Take 1 tablet by mouth daily  levothyroxine (SYNTHROID) 75 MCG tablet, Take 1 tablet by mouth Daily  aspirin EC 81 MG EC tablet, Take 1 tablet by mouth daily  Calcium Carb-Cholecalciferol 600-800 MG-UNIT TABS, Take 1 tablet by mouth daily  traMADol (ULTRAM) 50 MG tablet, Take 50 mg by mouth 2 times daily as needed.     Current Medications:  Current Facility-Administered Medications: sodium chloride flush 0.9 % injection 5-40 mL, 5-40 mL, IntraVENous, 2 times per day  sodium chloride flush 0.9 % injection 5-40 mL, 5-40 mL, IntraVENous, PRN  0.9 % sodium chloride infusion, , IntraVENous, PRN  sodium chloride flush 0.9 % injection 5-40 mL, 5-40 mL, IntraVENous, 2 times per day  sodium chloride flush 0.9 % injection 5-40 mL, 5-40 mL, IntraVENous, PRN  0.9 % sodium chloride infusion, , IntraVENous, PRN  ondansetron (ZOFRAN-ODT) disintegrating tablet 4 mg, 4 mg, Oral, Q8H PRN **OR** ondansetron (ZOFRAN) injection 4 mg, 4 mg, IntraVENous, Q6H PRN  melatonin tablet 5 mg, 5 mg, Oral, Nightly PRN  sodium chloride flush 0.9 % injection 5-40 mL, 5-40 mL, IntraVENous, 2 times per day  sodium chloride flush 0.9 % injection 5-40 mL, 5-40 mL, IntraVENous, PRN  0.9 % sodium chloride infusion, , IntraVENous, PRN  clopidogrel (PLAVIX) tablet 75 mg, 75 mg, Oral, Daily  aspirin EC tablet 81 mg, 81 mg, Oral, Daily  levothyroxine (SYNTHROID) tablet 75 mcg, 75 mcg, Oral, Daily  sodium chloride flush 0.9 % injection 5-40 mL, 5-40 mL, IntraVENous, 2 times per day  sodium chloride flush 0.9 % injection 5-40 mL, 5-40 mL, IntraVENous, PRN  0.9 % sodium chloride infusion, , IntraVENous, PRN  enoxaparin (LOVENOX) injection 40 mg, 40 mg, SubCUTAneous, Daily  polyethylene glycol (GLYCOLAX) packet 17 g, 17 g, Oral, Daily PRN  acetaminophen (TYLENOL) tablet 650 mg, 650 mg, Oral, Q6H PRN **OR** acetaminophen (TYLENOL) suppository 650 mg, 650 mg, Rectal, Q6H PRN  oxyCODONE-acetaminophen (PERCOCET) 5-325 MG per tablet 1 tablet, 1 tablet, Oral, Q6H PRN    REVIEW OF SYSTEMS     CONSTITUTIONAL: negative for fatigue and malaise   EYES: negative for double vision and photophobia    HEENT: negative for tinnitus and sore throat   RESPIRATORY: negative for cough, shortness of breath   CARDIOVASCULAR: negative for chest pain, palpitations, or syncope   GASTROINTESTINAL: negative for abdominal pain, nausea, vomiting, diarrhea, or constipation    GENITOURINARY: negative for incontinence or retention    MUSCULOSKELETAL: negative for neck or back pain, negative for extremity pain   NEUROLOGICAL: Negative for seizures, headaches, weakness, numbness, confusion, aphasia, dysarthria    PSYCHIATRIC: negative for agitation, hallucination, SI/HI   SKIN Negative for spontaneous contusions, rashes, or lesions      PHYSICAL EXAM:     BP (!) 118/51   Pulse 73   Temp (P) 98 °F (36.7 °C)   Resp 18   Ht 5' 3\" (1.6 m)   Wt 119 lb 11.2 oz (54.3 kg)   SpO2 98%   BMI 21.20 kg/m²     PHYSICAL EXAM:  CONSTITUTIONAL:  Well developed, well nourished, alert and oriented x 3, in no acute distress. GCS 15. Nontoxic. No dysarthria. No aphasia.    HEAD:  normocephalic, atraumatic    EYES:  PERRLA, EOMI.   ENT:  moist mucous membranes   LUNGS:  Equal air entry bilaterally   CARDIOVASCULAR:  normal s1 / s2   ABDOMEN:  Soft, no rigidity   NECK supple, symmetric, no midline tenderness to palpation    BACK without midline tenderness, step-offs or deformities    EXTREMITIES Normal ROM with no deformities   NEUROLOGIC:  Mental Status:  A & O x3,awake             Cranial Nerves:    cranial nerves II-XII are grossly intact    Motor Exam:    Drift:  absent  Tone:  normal    Motor exam is symmetrical 5 out of 5 all extremities bilaterally    Sensory:    Touch:    Right Upper Extremity:  normal  Left Upper Extremity:  normal  Right Lower Extremity:  normal  Left Lower Extremity:  normal    Deep Tendon Reflexes:    Right Bicep:  2+  Left Bicep:  2+  Right Knee:  2+  Left Knee:  2+    Plantar Response:  Right:  downgoing  Left:  downgoing    Clonus:  N/A  Arteaga's:  N/A    Coordination/Dysmetria:  Heel to Shin:  Right:  normal  Left:  normal  Finger to Nose:   Right:  normal  Left:  normal   Dysdiadochokinesia:  absent    Gait: Deferred   SKIN No obvious ecchymosis, rashes, or lesions        LABS AND IMAGING:     RECENT LABS:  CBC with Differential:    Lab Results   Component Value Date/Time    WBC 9.2 01/12/2023 04:12 AM    RBC 3.31 01/12/2023 04:12 AM    HGB 9.7 01/12/2023 04:12 AM    HCT 29.6 01/12/2023 04:12 AM     01/12/2023 04:12 AM    MCV 89.4 01/12/2023 04:12 AM    MCH 29.3 01/12/2023 04:12 AM    MCHC 32.8 01/12/2023 04:12 AM    RDW 14.3 01/12/2023 04:12 AM    LYMPHOPCT 20 01/12/2023 04:12 AM    LYMPHOPCT 56.1 05/04/2021 12:00 AM    MONOPCT 14 01/12/2023 04:12 AM    MONOPCT 11.7 05/04/2021 12:00 AM    EOSPCT 1.3 05/04/2021 12:00 AM    BASOPCT 0 01/12/2023 04:12 AM    BASOPCT 0.6 05/04/2021 12:00 AM    MONOSABS 1.30 01/12/2023 04:12 AM    MONOSABS 0.7 05/04/2021 12:00 AM    LYMPHSABS 1.82 01/12/2023 04:12 AM    LYMPHSABS 3.1 05/04/2021 12:00 AM    EOSABS <0.03 01/12/2023 04:12 AM    EOSABS 0.1 05/04/2021 12:00 AM    BASOSABS 0.04 01/12/2023 04:12 AM     BMP:    Lab Results   Component Value Date/Time     01/12/2023 04:27 PM    K 3.9 01/12/2023 04:12 AM    CL 93 01/12/2023 04:12 AM    CO2 19 01/12/2023 04:12 AM    BUN 12 01/12/2023 04:12 AM    LABALBU 3.8 01/09/2023 08:24 PM    CREATININE 0.88 01/12/2023 04:12 AM    CALCIUM 8.4 01/12/2023 04:12 AM    LABGLOM >60 01/12/2023 04:12 AM    GLUCOSE 94 01/12/2023 04:12 AM       RADIOLOGY:     XR ANKLE RIGHT (MIN 3 VIEWS)   Final Result   Previous ORIF of lateral and medial malleolar fracture. No hardware   complication. No acute osseous findings. XR CHEST PORTABLE   Final Result   No acute process. Stable chest x-ray. MRI BRAIN WO CONTRAST   Final Result   No acute infarct or other acute intracranial process. Suspected 8 mm medially projecting left cavernous segment ICA aneurysm. Nonemergent CTA head imaging recommended. IR ANGIOGRAM CAROTID C EREBRAL BILATERAL    (Results Pending)   IR ANGIOGRAM CAROTID C EREBRAL BILATERAL    (Results Pending)       Labs and Images reviewed with:    [] Candice Rosales MD    [x] Gabby Friend MD  [] Agatha Olsen MD  [] there are no new interval images to review. ASSESSMENT AND PLAN:         ASSESSMENT:     This is a [de-identified] y.o. female who initially presented with hyponatremia and left sided numbness and weakness which had resolved by the time she presented to [place].  Initial NIH of 7 for decreased consciousness and mild dysmetria. CTA showed left ICA aneurysm and was taken for embolization by endovascular neurosurgery. Hyponatremia has been gradually improving over the last few days. Patient care will be discussed with attending, will reevaluate patient along with attending. PLAN/MEDICAL DECISION MAKING:      NEUROLOGIC:  - MRI brain without contrast: No acute infarct. Suspected 8 mm medially projecting left cavernous segment ICA aneurysm. - DSA 1/11/2023: Left ICA wide necked medially oriented sidewall cavernous segment aneurysm.   - S/p left ICA cavernous segment aneurysm pipeline device embolization  - Continue aspirin 81 mg daily and Plavix 75 mg daily  - -140  - Neuro checks per protocol    CARDIOVASCULAR:  - Goal -140  - Continue telemetry    PULMONARY:  -On room air    RENAL/FLUID/ELECTROLYTE:  - BUN 14/ Creatinine 0.74  - Urine output 400/540  - IVF: None  - Fluid restriction 1500 mL  - Hyponatremia: Sodium 131, improving  - Sodium check Q4 hourly  - Replace electrolytes PRN  - Daily BMP    GI/NUTRITION:  NUTRITION:  ADULT DIET; Regular  - Bowel regimen: GlycoLax as needed  - GI prophylaxis: Not indicated    ID:  - Afebrile  - WBC 9.3  - Continue to monitor for fevers  - Daily CBC    HEME:   - H&H 7.9/22.6  - Platelets 221  - Daily CBC    ENDOCRINE:  - Continue to monitor blood glucose, goal <180    OTHER:  - PT/OT/ST     PROPHYLAXIS:  Stress ulcer: Not indicated    DVT PROPHYLAXIS:  - SCD sleeves - Thigh High   - No chemoprophylaxis anticoagulation at this time.     DISPOSITION: Remain in ICU for close monitoring      Amanda Mcclain MD  Neuro Critical Care Service   Pager [phone]  1/13/2023     6:07 AM

## 2023-01-13 NOTE — PLAN OF CARE
Problem: Discharge Planning  Goal: Discharge to home or other facility with appropriate resources  Outcome: Progressing     Problem: Pain  Goal: Verbalizes/displays adequate comfort level or baseline comfort level  Outcome: Progressing     Problem: Confusion  Goal: Confusion, delirium, dementia, or psychosis is improved or at baseline  Description: INTERVENTIONS:  1. Assess for possible contributors to thought disturbance, including medications, impaired vision or hearing, underlying metabolic abnormalities, dehydration, psychiatric diagnoses, and notify attending LIP  2. [place] high risk fall precautions, as indicated  3. Provide frequent short contacts to provide reality reorientation, refocusing and direction  4. Decrease environmental stimuli, including noise as appropriate  5. Monitor and intervene to maintain adequate nutrition, hydration, elimination, sleep and activity  6. If unable to ensure safety without constant attention obtain sitter and review sitter guidelines with assigned personnel  7. Initiate Psychosocial CNS and Spiritual Care consult, as indicated  Outcome: Progressing     Problem: Safety - Adult  Goal: Free from fall injury  Outcome: Progressing     Problem: ABCDS Injury Assessment  Goal: Absence of physical injury  Outcome: Progressing     Problem: Skin/Tissue Integrity  Goal: Absence of new skin breakdown  Description: 1. Monitor for areas of redness and/or skin breakdown  2. Assess vascular access sites hourly  3. Every 4-6 hours minimum:  Change oxygen saturation probe site  4. Every 4-6 hours:  If on nasal continuous positive airway pressure, respiratory therapy assess nares and determine need for appliance change or resting period.   Outcome: Progressing

## 2023-01-13 NOTE — PROGRESS NOTES
SUBJECTIVE    Patient was seen and examined. She was comfortable alert and awake. She denies any tingling or numbness. No headache or blurring of vision. She underwent internal carotid artery embolization yesterday and tolerated procedure fairly well. Sodium is now is in the range of 1 30-1 31.     OBJECTIVE      CURRENT TEMPERATURE:  Temp: (P) 98 °F (36.7 °C)  MAXIMUM TEMPERATURE OVER 24HRS:  Temp (24hrs), Av.9 °F (36.6 °C), Min:96.8 °F (36 °C), Max:98.6 °F (37 °C)    CURRENT RESPIRATORY RATE:  Resp: 12  CURRENT PULSE:  Heart Rate: 78  CURRENT BLOOD PRESSURE:  BP: 122/61  24HR BLOOD PRESSURE RANGE:  Systolic (27KXI), FNL:741 , Min:94 , JYI:783   ; Diastolic (05ZVR), EGL:21, Min:36, Max:95    24HR INTAKE/OUTPUT:    Intake/Output Summary (Last 24 hours) at 2023 0930  Last data filed at 2023 0600  Gross per 24 hour   Intake 400 ml   Output 555 ml   Net -155 ml     WEIGHT :Patient Vitals for the past 96 hrs (Last 3 readings):   Weight   01/10/23 0521 119 lb 11.2 oz (54.3 kg)   23 124 lb (56.2 kg)     PHYSICAL EXAM      GENERAL APPEARANCE: Stable alert and oriented x3  SKIN: Warm to touch  EYES: Pupils were symmetrical and reactive to light  ENT: no thrush no pharyngeal congestion   NECK: No JVD or carotid bruit   PULMONARY: Lateral air entry and clear  CADRDIOVASCULAR: S1 and S2 audible no S3   ABDOMEN: soft nontender, bowel sounds present, no organomegaly,  no ascites EXTREMITIES: Right lower extremity and foot was wrapped in a dressing  CURRENT MEDICATIONS      sodium chloride flush 0.9 % injection 5-40 mL, 2 times per day  sodium chloride flush 0.9 % injection 5-40 mL, PRN  0.9 % sodium chloride infusion, PRN  sodium chloride flush 0.9 % injection 5-40 mL, 2 times per day  sodium chloride flush 0.9 % injection 5-40 mL, PRN  0.9 % sodium chloride infusion, PRN  ondansetron (ZOFRAN-ODT) disintegrating tablet 4 mg, Q8H PRN   Or  ondansetron (ZOFRAN) injection 4 mg, Q6H PRN  melatonin tablet 5 mg, Nightly PRN  sodium chloride flush 0.9 % injection 5-40 mL, 2 times per day  sodium chloride flush 0.9 % injection 5-40 mL, PRN  0.9 % sodium chloride infusion, PRN  clopidogrel (PLAVIX) tablet 75 mg, Daily  aspirin EC tablet 81 mg, Daily  levothyroxine (SYNTHROID) tablet 75 mcg, Daily  sodium chloride flush 0.9 % injection 5-40 mL, 2 times per day  sodium chloride flush 0.9 % injection 5-40 mL, PRN  0.9 % sodium chloride infusion, PRN  enoxaparin (LOVENOX) injection 40 mg, Daily  polyethylene glycol (GLYCOLAX) packet 17 g, Daily PRN  acetaminophen (TYLENOL) tablet 650 mg, Q6H PRN   Or  acetaminophen (TYLENOL) suppository 650 mg, Q6H PRN  oxyCODONE-acetaminophen (PERCOCET) 5-325 MG per tablet 1 tablet, Q6H PRN        LABS      CBC:   Recent Labs     01/11/23 0454 01/12/23 0412 01/13/23  0603   WBC 7.0 9.2 9.3   RBC 3.64* 3.31* 2.61*   HGB 10.8* 9.7* 7.9*   HCT 32.1* 29.6* 22.6*   MCV 88.2 89.4 86.6   MCH 29.7 29.3 30.3   MCHC 33.6 32.8 35.0*   RDW 13.9 14.3 14.6*    341 303   MPV 10.2 10.0 10.3      BMP:   Recent Labs     01/11/23 0454 01/11/23  1033 01/12/23  0412 01/12/23  0731 01/12/23  1017 01/12/23  1627 01/13/23  0603   *   < > 124*   < > 125* 132* 131*   K 4.1  --  3.9  --   --   --  3.8   CL 89*  --  93*  --   --   --  101   CO2 16*  --  19*  --   --   --  18*   BUN 6*  --  12  --   --   --  14   CREATININE 0.75  --  0.88  --   --   --  0.74   GLUCOSE 76  --  94  --   --   --  143*   CALCIUM 8.8  --  8.4*  --   --   --  8.5*    < > = values in this interval not displayed. BNP:No results found for: BNP  PHOSPHORUS:    No results for input(s): PHOS in the last 72 hours. MAGNESIUM:   No results for input(s): MG in the last 72 hours. ALBUMIN:   No results for input(s): LABALBU in the last 72 hours.     IRON:    Lab Results   Component Value Date/Time    IRON 43 10/05/2022 12:00 AM     IRON SATURATION:  No results found for: LABIRON  TIBC:    Lab Results   Component Value Date/Time    TIBC 280 10/05/2022 12:00 AM     FERRITIN:    Lab Results   Component Value Date/Time    FERRITIN 67.0 10/05/2022 12:00 AM     JAZMINE: No results found for: JAZMINE    SPEP:   Lab Results   Component Value Date/Time    PROT 7.3 01/09/2023 08:24 PM     RADIOLOGY      Reviewed as available. ASSESSMENT    1. Symptomatic hyponatremia: Resolved and sodium slowly and gradually corrected. Most recent sodium is 132  2. Hyponatremia most likely due to increase in circulating ADH due to pain due to fracture, underlying SIADH cannot be ruled out. 3.  Metabolic acidosis: Improving and bicarbonate is up to 18-19. Patient has normal anion gap acidosis  4. Confusion: Improved   PLAN      1.  DC every 4 hours sodium checks  2. Check sodium twice a day  3. Continue free water restriction to 1500 mL a day for now  4. Renal sign off, please call if you have any question    Please do not hesitate to call with questions.     Electronically signed by Fouzia Huff MD on 1/13/2023 at 9:30 AM

## 2023-01-13 NOTE — PROGRESS NOTES
Physical Therapy  Facility/Department: [address] NEURO ICU  Physical Therapy Initial Assessment    Name: Carolyne Herr  : 1940  MRN: [id number]  Date of Service: 2023  Chief Complaint   Patient presents with    Extremity Weakness    Altered Mental Status      [de-identified]year old woman with CKD stage 3, hypothyroidism, fell 3 weeks ago and broke her right foot, p/w acute left hemiparesis that resolved by the time she is in the ED. 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 MRI brain is neg for stroke  - s/p left ICA cavernous segment aneurysm pipeline embolization on 2023  Discharge Recommendations:  Patient would benefit from continued therapy after discharge   PT Equipment Recommendations  Equipment Needed: No      Patient Diagnosis(es): The primary encounter diagnosis was Altered mental status, unspecified altered mental status type. A diagnosis of Hyponatremia was also pertinent to this visit. Past Medical History:  has a past medical history of Arthritis, CAD (coronary artery disease), Edema, Hematuria, Hypotension, unspecified, Pure hypercholesterolemia, Specified congenital anomalies of hair, Unspecified hypothyroidism, and Unspecified vitamin D deficiency. Past Surgical History:  has a past surgical history that includes Cardiac surgery (); Colonoscopy (2008); Cataract removal with implant (Bilateral, 2016); Colonoscopy (2018); and Carotid angioplasty (Bilateral, 2023). Assessment   Body Structures, Functions, Activity Limitations Requiring Skilled Therapeutic Intervention: Decreased functional mobility ; Decreased tolerance to work activity; Decreased strength;Decreased endurance;Decreased balance;Decreased fine motor control  Assessment: Pt presents with general weakness and deconditioning, NWB to rigth LE and inabilty to ambulate or transfer without assist. Pt will continue to benefit from PT to progress activity and promote safety and improved functional mobility  Therapy Prognosis: Good  Decision Making: Medium Complexity  Clinical Presentation: evolving  Requires PT Follow-Up: Yes  Activity Tolerance  Activity Tolerance: Patient tolerated evaluation without incident  Activity Tolerance Comments: Pt intervention required extra time and effort pt easily fatiged with noted increased HR during activity. Plan   Physcial Therapy Plan  General Plan:  (5-6x/wk)  Current Treatment Recommendations: Strengthening, Balance training, Functional mobility training, Transfer training, Neuromuscular re-education, Gait training, Endurance training, Therapeutic activities  Safety Devices  Type of Devices: All fall risk precautions in place     Restrictions  Restrictions/Precautions  Restrictions/Precautions: Up as Tolerated, Weight Bearing, Seizure, Surgical protocol, Surgical Protocols, Fall Risk  Required Braces or Orthoses?: No  Lower Extremity Weight Bearing Restrictions  Right Lower Extremity Weight Bearing: Non Weight Bearing  Partial Weight Bearing Percentage Or Pounds: RLE NWB (per Pt, s/p ORIF Ankle)  Left Lower Extremity Weight Bearing: Non Weight Bearing  Position Activity Restriction  Other position/activity restrictions: Restrictions for Pt include:  SBP Goal 100-140 (per Endovascular neurosurgery)  //  RLE NWB (per Pt, s/p ORIF). Subjective   General  Chart Reviewed: Yes  Patient assessed for rehabilitation services?: Yes  Additional Pertinent Hx: [de-identified]year old woman with CKD stage 3, hypothyroidism, fell 3 weeks ago and broke her right foot, p/w acute left hemiparesis that resolved by the time she is in the ED. 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  MRI brain is neg for stroke  - s/p left ICA cavernous segment aneurysm pipeline embolization on 1/13/2023  Response To Previous Treatment: Not applicable  Follows Commands: Within Functional Limits  Other (Comment): Pt awake and alert in agreement with  PT intervention, okay per RN to see  General Comment  Comments: Pt presents with baseline NWB for right LE admitted from rehab facility  Subjective  Subjective: Pt verbalized no pain at rest or with activity         Social/Functional History  Social/Functional History  Lives With:  (Currently at [facility] for [address],  at home lives with son 24/7 supervison as assist available)  Type of Home: Apartment  Home Layout: One level  Home Access: Stairs to enter with rails  Entrance Stairs - Number of Steps: 3 PATRICIA  Entrance Stairs - Rails: Left  Bathroom Shower/Tub: Tub/Shower unit  Bathroom Toilet: Standard  Bathroom Equipment: Grab bars in shower  Bathroom Accessibility: Accessible  Home Equipment:  (sleep on couch)  Receives Help From: Family  ADL Assistance: Independent (prior to initial right LE injury)  Bath: Independent  Dressing: Independent  Grooming: Independent  Feeding: Independent  Toileting: Independent  Homemaking Assistance: Independent  Homemaking Responsibilities: Yes  Meal Prep Responsibility: Secondary  Laundry Responsibility: Secondary  Cleaning Responsibility: Secondary  Bill Paying/Finance Responsibility: Secondary  Shopping Responsibility: Secondary  Dependent Care Responsibility: Secondary  Health Care Management: Secondary  Other (Comment): occasional food delivery, limited due to home location  Ambulation Assistance: Independent  Transfer Assistance: Independent  Active : Yes  Mode of Transportation: Car  Occupation: Retired  Type of Occupation:   Additional Comments: Pt report prior to hospitalization / fall pt was independent with all care and mobilty  Vision/Hearing  Vision  Vision: Within Functional Limits  Hearing  Hearing: Within functional limits    Cognition   Orientation  Overall Orientation Status: Within Functional Limits  Orientation Level: Oriented X4  Cognition  Overall Cognitive Status: Exceptions  Following Commands: Follows multistep commands with increased time; Follows multistep commands with repitition  Initiation: Requires cues for some  Sequencing: Requires cues for some  Cognition Comment: Pt is easily distracted and perseverates on RLE NWB and maintenance of RLE NWB. She is highly anxious re: consequences of not maintaining RLE NWB (requiring frequent cues,  coaxing and encouragement) Pt is pleasent and cooperative able to make functional needs known. Objective   Heart Rate: 96  Heart Rate Source: Monitor  BP: 114/60  BP Location: Right upper arm  BP Method: Automatic  MAP (Calculated): 78  Resp: 18  SpO2: 97 %  O2 Device: None (Room air)  Comment: At rest, SpO2 maintained >92%. With activity, SpO2 (1 instance) desaturated to 87-88% ( range), Pt was immediately returned to sitting EOB, guided through diaphragmatic breathing where SpO2 returned >90% (RA) and HR <110. RN notified. Pt also notifed to hold breath with activity // standing d/t anxiety. Observation/Palpation  Posture: Good  Observation: Pt able to sit with erect posture  Gross Assessment  AROM: Within functional limits  PROM: Within functional limits  Strength:  Within functional limits  Coordination: Within functional limits  Tone: Normal  Sensation: Intact     AROM RLE (degrees)  RLE AROM: Exceptions  RLE General AROM: rigth  foot /ankle in bandaging post surgical with NWB, Hip and knee WFL  AROM LLE (degrees)  LLE AROM : WFL  AROM RUE (degrees)  RUE AROM : WFL  AROM LUE (degrees)  LUE AROM : WFL  Strength RLE  Strength RLE: Exception  Comment: deferred MMT at ankle pt able to lift LE against gravity, poor tolerance with maintaining NWB  Strength LLE  Strength LLE: WFL  Strength RUE  Strength RUE: WFL  Strength LUE  Strength LUE: WFL  Strength Other  Other: overall general weakness and deconditioning, requireing rest breaks activity require extra time and effort           Bed mobility  Bridging: Supervision  Rolling to Left: Supervision  Rolling to Right: Supervision  Supine to Sit: Supervision  Sit to Supine: Supervision  Scooting: Supervision  Transfers  Sit to Stand: Minimal Assistance;2 Person Assistance  Stand to Sit: 2 Person Assistance;Minimal Assistance  Bed to Chair: Unable to assess;Dependent/Total  Stand Pivot Transfers: Dependent/Total  Comment: Pt demonstrates poor tolerance in standing , unable to shuffle step with left LE using walker  Ambulation  Surface: Level tile  Device: Rolling Walker  Assistance: Minimal assistance  Quality of Gait: Pt able to stand x 4 minutes, slight shuffle step with left le, unable to hop  Distance: Unable to hop with NWB rigth LE using walker, repeated trails, education on sequencing     Balance  Posture: Good  Sitting - Static: Good  Sitting - Dynamic: Good  Standing - Static: Good  Standing - Dynamic: Good  Functional Reach Test  Fall Risk (Score of <10 inches is fall risk): Yes  Exercise Treatment: sit EOB x 10 min, education and practical instruction on STS, hand and foot placement and maintaining WB status                                                          AM-PAC Score  AM-PAC Inpatient Mobility Raw Score : 13 (01/13/23 123)  -PAC Inpatient T-Scale Score : 36.74 (01/13/23 1230)  Mobility Inpatient CMS 0-100% Score: 64.91 (01/13/23 123)  Mobility Inpatient CMS G-Code Modifier : CL (01/13/23 1230)             Goals  Short Term Goals  Time Frame for Short Term Goals: 10  Short Term Goal 1: Pt to stand pivot transfer from bed to WC NWB rigth with AD MinAx1  Short Term Goal 2: Pt to demo independent bed mobilty without railing  Short Term Goal 3: Pt to self propel 25 ft with manaual WC with supervision  Patient Goals   Patient Goals : to walk       Education  Patient Education  Education Given To: Patient; Family  Education Provided: Role of Therapy;Transfer Training;Equipment;Plan of Care  Education Provided Comments: education and practical instruction on STS, hand and foot placement and maintaining WB status  Education Method: Demonstration  Barriers to Learning: None  Education Outcome: Verbalized understanding      Therapy Time   Individual Concurrent Group Co-treatment   Time In 8138         Time Out 4163         Minutes 39         Timed Code Treatment Minutes: 23 Minutes (Co-Treat with OT)       Dot Tao, PT, DPT

## 2023-01-14 LAB
ABSOLUTE EOS #: <0.03 K/UL (ref 0–0.44)
ABSOLUTE IMMATURE GRANULOCYTE: 0.13 K/UL (ref 0–0.3)
ABSOLUTE LYMPH #: 2.55 K/UL (ref 1.1–3.7)
ABSOLUTE MONO #: 0.96 K/UL (ref 0.1–1.2)
ANION GAP SERPL CALCULATED.3IONS-SCNC: 9 MMOL/L (ref 9–17)
BASOPHILS # BLD: 1 % (ref 0–2)
BASOPHILS ABSOLUTE: 0.04 K/UL (ref 0–0.2)
BUN BLDV-MCNC: 12 MG/DL (ref 8–23)
CALCIUM SERPL-MCNC: 8.3 MG/DL (ref 8.6–10.4)
CHLORIDE BLD-SCNC: 104 MMOL/L (ref 98–107)
CO2: 22 MMOL/L (ref 20–31)
CREAT SERPL-MCNC: 0.82 MG/DL (ref 0.5–0.9)
EOSINOPHILS RELATIVE PERCENT: 0 % (ref 1–4)
GFR SERPL CREATININE-BSD FRML MDRD: >60 ML/MIN/1.73M2
GLUCOSE BLD-MCNC: 84 MG/DL (ref 70–99)
HCT VFR BLD CALC: 22.3 % (ref 36.3–47.1)
HEMOGLOBIN: 7.2 G/DL (ref 11.9–15.1)
IMMATURE GRANULOCYTES: 2 %
LYMPHOCYTES # BLD: 29 % (ref 24–43)
MCH RBC QN AUTO: 29.6 PG (ref 25.2–33.5)
MCHC RBC AUTO-ENTMCNC: 32.3 G/DL (ref 28.4–34.8)
MCV RBC AUTO: 91.8 FL (ref 82.6–102.9)
MONOCYTES # BLD: 11 % (ref 3–12)
NRBC AUTOMATED: 0 PER 100 WBC
PDW BLD-RTO: 15.2 % (ref 11.8–14.4)
PLATELET # BLD: 270 K/UL (ref 138–453)
PMV BLD AUTO: 10.2 FL (ref 8.1–13.5)
POTASSIUM SERPL-SCNC: 3.9 MMOL/L (ref 3.7–5.3)
RBC # BLD: 2.43 M/UL (ref 3.95–5.11)
RBC # BLD: ABNORMAL 10*6/UL
SEG NEUTROPHILS: 58 % (ref 36–65)
SEGMENTED NEUTROPHILS ABSOLUTE COUNT: 5.03 K/UL (ref 1.5–8.1)
SODIUM BLD-SCNC: 135 MMOL/L (ref 135–144)
WBC # BLD: 8.7 K/UL (ref 3.5–11.3)

## 2023-01-14 PROCEDURE — 97530 THERAPEUTIC ACTIVITIES: CPT

## 2023-01-14 PROCEDURE — 97110 THERAPEUTIC EXERCISES: CPT

## 2023-01-14 PROCEDURE — 6370000000 HC RX 637 (ALT 250 FOR IP)

## 2023-01-14 PROCEDURE — 6370000000 HC RX 637 (ALT 250 FOR IP): Performed by: NURSE PRACTITIONER

## 2023-01-14 PROCEDURE — 36415 COLL VENOUS BLD VENIPUNCTURE: CPT

## 2023-01-14 PROCEDURE — 6360000002 HC RX W HCPCS

## 2023-01-14 PROCEDURE — 2580000003 HC RX 258: Performed by: ANESTHESIOLOGY

## 2023-01-14 PROCEDURE — 2060000000 HC ICU INTERMEDIATE R&B

## 2023-01-14 PROCEDURE — 6370000000 HC RX 637 (ALT 250 FOR IP): Performed by: STUDENT IN AN ORGANIZED HEALTH CARE EDUCATION/TRAINING PROGRAM

## 2023-01-14 PROCEDURE — 2580000003 HC RX 258

## 2023-01-14 PROCEDURE — 2580000003 HC RX 258: Performed by: STUDENT IN AN ORGANIZED HEALTH CARE EDUCATION/TRAINING PROGRAM

## 2023-01-14 PROCEDURE — 99222 1ST HOSP IP/OBS MODERATE 55: CPT | Performed by: STUDENT IN AN ORGANIZED HEALTH CARE EDUCATION/TRAINING PROGRAM

## 2023-01-14 PROCEDURE — 94761 N-INVAS EAR/PLS OXIMETRY MLT: CPT

## 2023-01-14 PROCEDURE — 2580000003 HC RX 258: Performed by: PSYCHIATRY & NEUROLOGY

## 2023-01-14 PROCEDURE — 85025 COMPLETE CBC W/AUTO DIFF WBC: CPT

## 2023-01-14 PROCEDURE — 80048 BASIC METABOLIC PNL TOTAL CA: CPT

## 2023-01-14 RX ADMIN — TRAMADOL HYDROCHLORIDE 50 MG: 50 TABLET, COATED ORAL at 23:15

## 2023-01-14 RX ADMIN — Medication 81 MG: at 09:16

## 2023-01-14 RX ADMIN — LEVOTHYROXINE SODIUM 75 MCG: 75 TABLET ORAL at 07:51

## 2023-01-14 RX ADMIN — SODIUM CHLORIDE, PRESERVATIVE FREE 10 ML: 5 INJECTION INTRAVENOUS at 21:00

## 2023-01-14 RX ADMIN — SODIUM CHLORIDE, PRESERVATIVE FREE 10 ML: 5 INJECTION INTRAVENOUS at 09:16

## 2023-01-14 RX ADMIN — SODIUM CHLORIDE, PRESERVATIVE FREE 10 ML: 5 INJECTION INTRAVENOUS at 09:17

## 2023-01-14 RX ADMIN — Medication 5 MG: at 23:18

## 2023-01-14 RX ADMIN — ENOXAPARIN SODIUM 40 MG: 100 INJECTION SUBCUTANEOUS at 09:16

## 2023-01-14 RX ADMIN — TRAMADOL HYDROCHLORIDE 50 MG: 50 TABLET, COATED ORAL at 15:41

## 2023-01-14 RX ADMIN — CLOPIDOGREL 75 MG: 75 TABLET, FILM COATED ORAL at 09:16

## 2023-01-14 RX ADMIN — SODIUM CHLORIDE, PRESERVATIVE FREE 10 ML: 5 INJECTION INTRAVENOUS at 23:25

## 2023-01-14 ASSESSMENT — PAIN DESCRIPTION - DESCRIPTORS: DESCRIPTORS: ACHING

## 2023-01-14 ASSESSMENT — PAIN DESCRIPTION - ORIENTATION: ORIENTATION: RIGHT

## 2023-01-14 ASSESSMENT — PAIN SCALES - GENERAL
PAINLEVEL_OUTOF10: 3
PAINLEVEL_OUTOF10: 8

## 2023-01-14 ASSESSMENT — PAIN DESCRIPTION - LOCATION: LOCATION: KNEE

## 2023-01-14 NOTE — PROGRESS NOTES
Endovascular Neurosurgery Progress Note    SUBJECTIVE:   No acute overnight events reported. No headache last night     Review of Systems:  CONSTITUTIONAL:  negative for fevers, chills, fatigue and malaise    EYES:  negative for double vision, blurred vision and photophobia     HEENT:  negative for tinnitus, epistaxis and sore throat    RESPIRATORY:  negative for cough, shortness of breath, wheezing    CARDIOVASCULAR:  negative for chest pain, palpitations, syncope, edema    GASTROINTESTINAL:  negative for nausea, vomiting    GENITOURINARY:  negative for incontinence    MUSCULOSKELETAL:  negative for neck or back pain    NEUROLOGICAL:  Positive for headache, visual floaters that patient states are a chronic problem  Negative for weakness and tingling  negative for dizziness    PSYCHIATRIC:  negative for anxiety      Review of systems otherwise negative. OBJECTIVE:     Vitals:    01/14/23 0600   BP: (!) 116/53   Pulse: 73   Resp: 19   Temp:    SpO2:         General:  Gen: normal habitus, NAD  HEENT: NCAT, mucosa moist  Cvs: RRR, S1 S2 normal  Resp: symmetric unlabored breathing  Abd: s/nd/nt  Ext: no edema  Skin: no lesions seen, warm and dry     Neuro:  Gen: awake and alert, oriented x3. Lang/speech: no aphasia or dysarthria. Follows commands. CN: PERRL, EOMI, VFF, V1-3 intact, face symmetric, hearing intact, shoulder shrug symmetric, tongue midline  Motor: grossly 5/5 UE and LE b/l  Sense: LT intact in all 4 ext. Coord: FTN and HTS intact b/l  DTR: deferred  Gait: deferred    NIH Stroke Scale:   1a  Level of consciousness: 0 - alert; keenly responsive   1b. LOC questions:  0 - answers both questions correctly   1c. LOC commands: 0 - performs both tasks correctly   2. Best Gaze: 0 - normal   3. Visual: 0 - no visual loss   4. Facial Palsy: 1 - minor paralysis (flattened nasolabial fold, asymmetric on smiling)   5a. Motor left arm: 0 - no drift, limb holds 90 (or 45) degrees for full 10 seconds   5b. Motor right arm: 0 - no drift, limb holds 90 (or 45) degrees for full 10 seconds   6a. Motor left le - no drift; leg holds 30 degree position for full 5 seconds   6b  Motor right le - no drift; leg holds 30 degree position for full 5 seconds   7. Limb Ataxia: 0 - absent   8. Sensory: 0 - normal; no sensory loss   9. Best Language:  0 - no aphasia, normal   10. Dysarthria: 0 - normal   11. Extinction and Inattention: 0 - no abnormality         Total:   1     LABS:   Reviewed. Lab Results   Component Value Date    HGB 7.2 (L) 2023    WBC 8.7 2023     2023     2023    BUN 12 2023    CREATININE 0.82 2023    AST 41 (H) 2023    ALT 22 2023    MG 1.5 (L) 01/10/2023    INR 1.0 2023      No results found for: COVID19    RADIOLOGY:   Images were personally reviewed including:      IR :   1. Left ICA medially oriented cavernous segment aneurysm with measurements?neck 7.64 mm, length 10.69 mm, width 6.98 mm, height 8.99 mm? 2. The above lesion was treated with 6 fr long sheath, Phenom-27 microcatheter, Pipeline embolization device 5x20mm. Sceptor 4 x11 mm balloon was then used to achieve balloon angioplasty to achieve appropriate wall apposition the flow diverter. 3.   Well apposed flow diverter, Óscar Spitz score-3? 4. Left dorsal ophthalmic artery             Diagnostic Cerebral Angiogram done on 2023  Left ICA wide necked, medially oriented, side wall, cavernous segment aneurysm with measurements neck 7.64 mm, length 10.69 mm, width 6.98 mm, height 8.99 mm   Right fetal PCA   Left subclavian artery origin occlusion         CTA head done on  2023 in an outside hospital: left ICA cavernous aneurysm 8mm     MRI brain 1/10/2023: no acute changes    ASSESSMENT:   [de-identified]year old woman with CKD stage 3, hypothyroidism, fell 3 weeks ago and broke her right foot, p/w acute left hemiparesis that resolved by the time she is in the ED.  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 MRI brain is neg for stroke  - s/p left ICA cavernous segment aneurysm pipeline embolization on 1/13/2023  - bilateral groin without active bleeding and intact peripheral pulses  - hyponatremia improving  - hemoglobin 7.2 today    PLAN:   - s/p left ICA cavernous segment aneurysm pipeline embolization on 01/12  - SBP goal 100-140  - continue with aspirin and Plavix  - Bed hold at [facility] needs pre-cert  - follow up with Pete Castellano MD 2 weeks after discharge and Dr. Lenny Velasco 3 months after discharge     Case discussed with Dr. Lenny Velasco attending.     Pete Castellano MD  Stroke, [facility]  [id number]  Electronically signed 1/14/2023 at 8:02 AM

## 2023-01-14 NOTE — PLAN OF CARE
Problem: Discharge Planning  Goal: Discharge to home or other facility with appropriate resources  1/14/2023 0044 by Carlos Lopez RN  Outcome: Progressing  Flowsheets (Taken 1/13/2023 2000)  Discharge to home or other facility with appropriate resources:   Identify barriers to discharge with patient and caregiver   Arrange for needed discharge resources and transportation as appropriate   Identify discharge learning needs (meds, wound care, etc)   Refer to discharge planning if patient needs post-hospital services based on physician order or complex needs related to functional status, cognitive ability or social support system  1/13/2023 1727 by Martha Lara RN  Outcome: Progressing     Problem: Pain  Goal: Verbalizes/displays adequate comfort level or baseline comfort level  1/14/2023 0044 by Carlos Lopez RN  Outcome: Progressing  Flowsheets (Taken 1/13/2023 2000)  Verbalizes/displays adequate comfort level or baseline comfort level:   Encourage patient to monitor pain and request assistance   Assess pain using appropriate pain scale   Administer analgesics based on type and severity of pain and evaluate response   Implement non-pharmacological measures as appropriate and evaluate response   Notify Licensed Independent Practitioner if interventions unsuccessful or patient reports new pain  1/13/2023 1727 by Martha Lara RN  Outcome: Progressing     Problem: Confusion  Goal: Confusion, delirium, dementia, or psychosis is improved or at baseline  Description: INTERVENTIONS:  1. Assess for possible contributors to thought disturbance, including medications, impaired vision or hearing, underlying metabolic abnormalities, dehydration, psychiatric diagnoses, and notify attending LIP  2. [facility] high risk fall precautions, as indicated  3. Provide frequent short contacts to provide reality reorientation, refocusing and direction  4. Decrease environmental stimuli, including noise as appropriate  5.  Monitor and intervene to maintain adequate nutrition, hydration, elimination, sleep and activity  6. If unable to ensure safety without constant attention obtain sitter and review sitter guidelines with assigned personnel  7. Initiate Psychosocial CNS and Spiritual Care consult, as indicated  1/14/2023 0044 by Michael Freeman RN  Outcome: Progressing  Flowsheets (Taken 1/13/2023 2000)  Effect of thought disturbance (confusion, delirium, dementia, or psychosis) are managed with adequate functional status:   Assess for contributors to thought disturbance, including medications, impaired vision or hearing, underlying metabolic abnormalities, dehydration, psychiatric diagnoses, notify [facility] high risk fall precautions, as indicated   Decrease environmental stimuli, including noise as appropriate   Monitor and intervene to maintain adequate nutrition, hydration, elimination, sleep and activity   If unable to ensure safety without constant attention obtain sitter and review sitter guidelines with assigned personnel  1/13/2023 1727 by Regine Keene RN  Outcome: Progressing     Problem: Safety - Adult  Goal: Free from fall injury  1/14/2023 0044 by Michael Freeman RN  Outcome: Progressing  [address] (Taken 1/13/2023 2139)  Free From Fall Injury:   Instruct family/caregiver on patient safety   Based on caregiver fall risk screen, instruct family/caregiver to ask for assistance with transferring infant if caregiver noted to have fall risk factors  1/13/2023 1727 by Regine Keene RN  Outcome: Progressing     Problem: ABCDS Injury Assessment  Goal: Absence of physical injury  1/14/2023 0044 by Michael Freeman RN  Outcome: Progressing  [address] (Taken 1/13/2023 2139)  Absence of Physical Injury: Implement safety measures based on patient assessment  1/13/2023 1727 by Regine Keene RN  Outcome: Progressing     Problem: Skin/Tissue Integrity  Goal: Absence of new skin breakdown  Description: 1.   Monitor for areas of redness and/or skin breakdown  2. Assess vascular access sites hourly  3. Every 4-6 hours minimum:  Change oxygen saturation probe site  4. Every 4-6 hours:  If on nasal continuous positive airway pressure, respiratory therapy assess nares and determine need for appliance change or resting period.   1/14/2023 0044 by Kenia Alberto RN  Outcome: Progressing  1/13/2023 1727 by Manav Chou RN  Outcome: Progressing

## 2023-01-14 NOTE — PROGRESS NOTES
Physical Therapy  Facility/Department: [address] NEURO ICU  Daily Treatment Note  NAME: Salome Carrillo  : 1940  MRN: [id number]    Date of Service: 2023    Discharge Recommendations:  Patient would benefit from continued therapy after discharge   PT Equipment Recommendations  Equipment Needed: No    Patient Diagnosis(es): The primary encounter diagnosis was Altered mental status, unspecified altered mental status type. A diagnosis of Hyponatremia was also pertinent to this visit. Assessment   Assessment: Pt completed BM with supervision, sit<>stand 2X 3 min's with 2WW support and min-CGA, able to maintain RLE NWB status but unable to mobilize in standing. Pt will benefit from continued therapy following d/c facilitating return to functional IND. Activity Tolerance: Patient tolerated evaluation without incident  Equipment Needed: No     Plan    Physcial Therapy Plan  General Plan: 5-7 times per week  Current Treatment Recommendations: Strengthening;Balance training;Functional mobility training;Transfer training;Neuromuscular re-education;Gait training; Endurance training; Therapeutic activities  PT Plan of Care: Daily     Restrictions  Restrictions/Precautions  Restrictions/Precautions: Up as Tolerated, Weight Bearing, Seizure, Surgical protocol, Surgical Protocols, Fall Risk  Required Braces or Orthoses?: No  Lower Extremity Weight Bearing Restrictions  Right Lower Extremity Weight Bearing: Non Weight Bearing  Partial Weight Bearing Percentage Or Pounds: RLE NWB (per Pt, s/p ORIF Ankle)  Left Lower Extremity Weight Bearing: Non Weight Bearing  Position Activity Restriction  Other position/activity restrictions: Restrictions for Pt include:  SBP Goal 100-140 (per Endovascular neurosurgery)  //  RLE NWB (per Pt, s/p ORIF). Subjective    Subjective: Pt in supine when therapy arrived. Very pleasant and agreeable to PT.   Pain: no c/o  Orientation  Overall Orientation Status: Within Functional Limits  Orientation Level: Oriented X4  Cognition  Overall Cognitive Status: [facility]  Cognition Comment: Pt is anxious but not overly so, eager to participate with interventions provided. Objective      Bed Mobility Training  Bed Mobility Training: Yes  Overall Level of Assistance: Supervision  Interventions: Verbal cues; Visual cues  Supine to Sit: Supervision  Sit to Supine: Supervision  Scooting: Moderate assistance (At end of session pt participated in lateral scooting along EOB to [facility]. She required modA w/therapist blocking B knees and helping with lifting her hips with LLE only while scooting to her R X 8 reps.)  Balance  Sitting: Intact  Standing: With support (2WW)  Transfer Training  Transfer Training: Yes  Overall Level of Assistance: Minimum assistance; Additional time; Adaptive equipment;Assist X1  Interventions: Visual cues; Verbal cues; Tactile cues; Safety awareness training  Sit to Stand: Minimum assistance; Additional time; Adaptive equipment;Assist X1  Stand to Sit: Adaptive equipment; Additional time;Minimum assistance;Assist X1  Gait Training  Gait Training: No  Wheelchair Management  Wheelchair Management: No  Neuromuscular Education  Neuromuscular Education: No  Weight Bearing  Weight Bearing Technique: No  PT Exercises  Exercise Treatment: Pt completed supine RLE AAROM SLR's X 15, RLE SAQ's w/modA maintaining hip flexion X 15, LLE partial bridges with therapist supporting RLE in hip flexion X 15, seated BLE manually resisted LAQ's X 15, 2 attempts at standing LLE heel raise with BUE support on 2WW but pt unable to complete a single rep, static standing with BUE support on 2WW 2X 3 min's while maintaining RLE NWB status. Safety Devices  Type of Devices: All fall risk precautions in place;Call light within reach;Gait belt;Nurse notified; Left in bed  Restraints  Restraints Initially in Place: No     Goals  Short Term Goals  Time Frame for Short Term Goals: 10  Short Term Goal 1: Pt to stand pivot transfer from bed to  NWB rigth with AD MinAx1  Short Term Goal 2: Pt to demo independent bed mobilty without railing  Short Term Goal 3: Pt to self propel 25 ft with manaual  with supervision  Patient Goals   Patient Goals : to walk    Education  Patient Education  Education Given To: Patient; Family  Education Provided: Role of Therapy;Transfer Training;Equipment;Plan of Care  Education Provided Comments: education and practical instruction on STS, hand and foot placement and maintaining WB status  Education Method: Demonstration  Barriers to Learning: None  Education Outcome: Verbalized understanding;Continued education needed    Therapy Time   Individual Concurrent Group Co-treatment   Time In 1000         Time Out 1032         Minutes [address] [facility]

## 2023-01-14 NOTE — PROGRESS NOTES
[facility]  Office: [address], DO, Chucho [place], DO, Kimmy Garciaint, DO, Chad Light Blood, DO, Loreto Wylie MD, Genoveva Pereyra MD, Duke Hare MD, Saurabh Kahn MD,  Shahida Ramirez MD, Kemi Bryan MD, Lawanda Zambrano, DO, Nadia Ortega MD,  Karen Jimenes MD, Shayna Yarbrough MD, Jeremy Dakins, DO, Dyana Jaimes MD, Shawn Drew MD, Stew Carroll DO, Mati Lockwood MD, Teddy Landrum MD, Deloris Lux MD, Amy House MD, Lamonte Cooks, DO, Jacey Osorio MD, Parmjit Mcneil MD, Mehul Li, CNP,  Ben Robb, CNP, Geovany Werner, CNP, Barb Snowden, CNP,  Shen White, [facility], Jeremy Cleaning, CNP, Jenny Orourke, CNP, Bunny Knight, CNP, Sachi Ford, CNP, Rasheeda Black, CNP, Gifty Stoll PASUL[place]NA, Paul Guadalupe, CNS, Dexter Ramirez, CNP, Frida Alvarado, [facility]    HISTORY AND PHYSICAL EXAMINATION            Date:   1/14/2023  Patient name:  Brant Linares  Date of admission:  1/9/2023  8:01 PM  MRN:   [id number]  Account:  [de-identified]  YOB: 1940  PCP:    Shahana Hernandez MD  Room:   0115/0115-01  Code Status:    Full Code    Chief Complaint:     Chief Complaint   Patient presents with    Extremity Weakness    Altered Mental Status     History Obtained From:     patient, electronic medical record    History of Present Illness:     Mrs. Michael Razo is a 27-year-old female with significant past medical history of hypertension, CAD, PVD, hypothyroidism, osteopenia, osteoarthritis, DVT, CKD stage III [facility] and was unable to perform exercises due to flaccid paralysis of her left upper extremity and reduced sensation. She was noted to have significant hyponatremia of 116 and CTA showed left ICA aneurysm. Neurology and nephrology was consulted and patient was treated with hypertonic saline. Patient was taken for DSA on 1/11/2023 which confirmed a left ICA cavernous segment aneurysm and neurovascular neurosurgery decided to take patient for embolization of that aneurysm was transferred to neuro ICU. Patient was then transferred to medicine after neuro ICU monitoring following procedure. 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 Krishna Bedolla         Medications Prior to Admission:     Prior to Admission medications    Medication Sig Start Date End Date Taking? Authorizing Provider   vitamin C (ASCORBIC ACID) 500 MG tablet Take 500 mg by mouth daily    Historical Provider, MD   Multiple Vitamins-Minerals (THERAPEUTIC MULTIVITAMIN-MINERALS) tablet Take 1 tablet by mouth daily    Historical Provider, MD   levothyroxine (SYNTHROID) 75 MCG tablet Take 1 tablet by mouth Daily 10/11/22   Arabella Goodson MD   aspirin EC 81 MG EC tablet Take 1 tablet by mouth daily 6/1/21   Arabella Goodson MD   Calcium Carb-Cholecalciferol 600-800 MG-UNIT TABS Take 1 tablet by mouth daily    Historical Provider, MD   traMADol (ULTRAM) 50 MG tablet Take 50 mg by mouth 2 times daily as needed.  9/29/14   Historical Provider, MD        Allergies:     Doxycycline, Pcn [penicillins], Pneumococcal vaccines, and Tetanus toxoids    Social History:     Tobacco:    reports that she quit smoking about 14 years ago. Her smoking use included cigarettes. She has a 5.50 pack-year smoking history. She has never used smokeless tobacco.  Alcohol:      reports no history of alcohol use. Drug Use:  reports no history of drug use. Family History:     Family History   Adopted: Yes     Review of Systems:     Positive and Negative as described in HPI. CONSTITUTIONAL: Positive for fatigue.  Negative for fevers, chills, sweats, weight loss  HEENT:  negative for vision, hearing changes, runny nose, throat pain  RESPIRATORY:  negative for shortness of breath, cough, congestion, wheezing  CARDIOVASCULAR:  negative for chest pain, palpitations  GASTROINTESTINAL:  negative for nausea, vomiting, diarrhea, constipation, change in bowel habits, abdominal pain   GENITOURINARY:  negative for difficulty of urination, burning with urination, frequency   INTEGUMENT:  negative for rash, skin lesions, easy bruising   HEMATOLOGIC/LYMPHATIC:  negative for swelling/edema   ALLERGIC/IMMUNOLOGIC:  negative for urticaria , itching  ENDOCRINE:  negative increase in drinking, increase in urination, hot or cold intolerance  MUSCULOSKELETAL: negative joint pains, muscle aches, swelling of joints  NEUROLOGICAL:  negative for headaches, dizziness, lightheadedness, numbness, pain, tingling extremities  BEHAVIOR/PSYCH:  negative for depression, anxiety    Physical Exam:   BP (!) 116/53   Pulse 73   Temp 97.7 °F (36.5 °C) (Oral)   Resp 19   Ht 5' 3\" (1.6 m)   Wt 123 lb 14.4 oz (56.2 kg)   SpO2 97%   BMI 21.95 kg/m²   Temp (24hrs), Av.1 °F (36.7 °C), Min:97.7 °F (36.5 °C), Max:98.4 °F (36.9 °C)    Recent Labs     23  1616   POCGLU 109*       Intake/Output Summary (Last 24 hours) at 2023 0728  Last data filed at 2023 0500  Gross per 24 hour   Intake --   Output 1415 ml   Net -1415 ml     General Appearance: alert, well appearing, and in no acute distress  Mental status: oriented to person, place, and time  Head: normocephalic, atraumatic  Eye: no icterus, redness extraocular eye movements intact, conjunctiva clear  Nose: no drainage noted  Mouth: mucous membranes moist  Neck: supple  Lungs: Bilateral equal air entry, clear to ausculation, no wheezing, rales or rhonchi, normal effort  Cardiovascular: normal rate, regular rhythm, no murmur  Abdomen: Soft, nontender, nondistended, normal bowel sounds  Neurologic: There are no new focal motor or sensory deficits, normal muscle tone and bulk, normal speech  Skin: No gross lesions, rashes, bruising or bleeding on exposed skin area  Extremities: Right foot/ankle with bandaging postsurgical, no pedal edema or calf pain with palpation  Psych: normal affect    Investigations:      Laboratory Testing:  Recent Results (from the past 24 hour(s))   Basic Metabolic Panel    Collection Time: 01/13/23  4:06 PM   Result Value Ref Range    Glucose 93 70 - 99 mg/dL    BUN 12 8 - 23 mg/dL    Creatinine 0.90 0.50 - 0.90 mg/dL    Est, Glom Filt Rate >60 >60 mL/min/1.73m2    Calcium 8.9 8.6 - 10.4 mg/dL    Sodium 131 (L) 135 - 144 mmol/L    Potassium 4.0 3.7 - 5.3 mmol/L    Chloride 98 98 - 107 mmol/L    CO2 21 20 - 31 mmol/L    Anion Gap 12 9 - 17 mmol/L   Basic Metabolic Panel w/ Reflex to MG    Collection Time: 01/14/23  5:17 AM   Result Value Ref Range    Glucose 84 70 - 99 mg/dL    BUN 12 8 - 23 mg/dL    Creatinine 0.82 0.50 - 0.90 mg/dL    Est, Glom Filt Rate >60 >60 mL/min/1.73m2    Calcium 8.3 (L) 8.6 - 10.4 mg/dL    Sodium 135 135 - 144 mmol/L    Potassium 3.9 3.7 - 5.3 mmol/L    Chloride 104 98 - 107 mmol/L    CO2 22 20 - 31 mmol/L    Anion Gap 9 9 - 17 mmol/L   CBC with Auto Differential    Collection Time: 01/14/23  5:17 AM   Result Value Ref Range    WBC 8.7 3.5 - 11.3 k/uL    RBC 2.43 (L) 3.95 - 5.11 m/uL    Hemoglobin 7.2 (L) 11.9 - 15.1 g/dL    Hematocrit 22.3 (L) 36.3 - 47.1 %    MCV 91.8 82.6 - 102.9 fL    MCH 29.6 25.2 - 33.5 pg MCHC 32.3 28.4 - 34.8 g/dL    RDW 15.2 (H) 11.8 - 14.4 %    Platelets 866 654 - 691 k/uL    MPV 10.2 8.1 - 13.5 fL    NRBC Automated 0.0 0.0 per 100 WBC    RBC Morphology ANISOCYTOSIS PRESENT     Seg Neutrophils 58 36 - 65 %    Lymphocytes 29 24 - 43 %    Monocytes 11 3 - 12 %    Eosinophils % 0 (L) 1 - 4 %    Basophils 1 0 - 2 %    Immature Granulocytes 2 (H) 0 %    Segs Absolute 5.03 1.50 - 8.10 k/uL    Absolute Lymph # 2.55 1.10 - 3.70 k/uL    Absolute Mono # 0.96 0.10 - 1.20 k/uL    Absolute Eos # <0.03 0.00 - 0.44 k/uL    Basophils Absolute 0.04 0.00 - 0.20 k/uL    Absolute Immature Granulocyte 0.13 0.00 - 0.30 k/uL       Imaging/Diagnostics:  XR ANKLE RIGHT (MIN 3 VIEWS)    Result Date: 1/12/2023  Previous ORIF of lateral and medial malleolar fracture. No hardware complication. No acute osseous findings. XR CHEST PORTABLE    Result Date: 1/10/2023  No acute process. Stable chest x-ray. MRI BRAIN WO CONTRAST    Result Date: 1/10/2023  No acute infarct or other acute intracranial process. Suspected 8 mm medially projecting left cavernous segment ICA aneurysm. Nonemergent CTA head imaging recommended. Assessment :      Hospital Problems             Last Modified POA    * (Principal) Hyponatremia 1/9/2023 Yes    Seizure-like activity ([address] 1/10/2023 Yes    Carotid aneurysm, left ([address] 1/10/2023 Yes    Altered mental status 1/10/2023 Yes    Cerebrovascular aneurysm 1/10/2023 Yes    TIA (transient ischemic attack) 1/10/2023 Yes    Low bicarbonate 1/13/2023 Yes    Hypothyroidism 1/10/2023 Yes    Overview Signed 5/5/2020 10:19 AM by Yves Miles MD     Consulted By Dr. Kelly Goltz 2020         Pure hypercholesterolemia 1/10/2023 Yes    Vitamin D deficiency 1/10/2023 Yes    Arthritis 1/10/2023 Yes    Overview Addendum 5/5/2020 10:20 AM by Yves Miles MD     Knee ;   On Tramadol from rheumatology          CAD (coronary artery disease) 1/10/2023 Yes    Overview Signed 2/7/2017  8:47 AM by Nafisa Govea MD     Feb 2016 Nuc stress test.          Osteopenia 1/10/2023 Yes    Overview Addendum 2/2/2016 10:15 AM by Nafisa Govea MD     5/2014 DEXA was done by [facility]          History of DVT (deep vein thrombosis) 1/10/2023 Yes    Chronic renal failure, stage 3b ([address] 1/10/2023 Yes     Plan:     Patient status inpatient in the Progressive Unit/Step down    Hyponatremia. Resolved. Left ICA aneurysm. Status post embolization. Continue aspirin 81 mg and Plavix 75 mg daily. Follow-up with endovascular in 2 weeks. Dr. Leonore Cheadle in 2 weeks and Dr. Elisha Diaz in 3 months. Anemia. Hemoglobin 7.2. No signs of active bleeding. Will monitor with H&H every 12 hours. CAD. Continue aspirin 81 mg daily. Hypothyroidism. Continue levothyroxine 75 mcg daily. Vitamin D deficiency. Vitamin D 50,000 units weekly x 8 weeks followed by 1000 units weekly thereafter. Osteoarthritis. Tylenol and Ultram as needed. Osteopenia. Continue vitamin D. History of DVT. On Lovenox for DVT prophylaxis. DVT prophylaxis: Lovenox 40 mg daily. GI prophylaxis: No indication at this time. Consultations:   IP CONSULT TO NEUROCRITICAL CARE  IP CONSULT TO NEPHROLOGY  IP CONSULT TO ENDOVASCULAR NEUROSURGERY    Patient is admitted as inpatient status because of co-morbidities listed above, severity of signs and symptoms as outlined, requirement for current medical therapies and most importantly because of direct risk to patient if care not provided in a hospital setting. Expected length of stay > 48 hours.     Donny Rivera DO  1/14/2023  7:28 AM    Copy sent to Dr. Nafisa Govea MD

## 2023-01-14 NOTE — PLAN OF CARE
Patient afebrile today. A & O x 4. Using bedpan for voiding. Waiting on precert to transfer back to [facility]. Will continue to monitor and notify physician of any significant changes.

## 2023-01-15 LAB
ABSOLUTE EOS #: 0.08 K/UL (ref 0–0.44)
ABSOLUTE IMMATURE GRANULOCYTE: 0.18 K/UL (ref 0–0.3)
ABSOLUTE LYMPH #: 3.32 K/UL (ref 1.1–3.7)
ABSOLUTE MONO #: 1.16 K/UL (ref 0.1–1.2)
ANION GAP SERPL CALCULATED.3IONS-SCNC: 7 MMOL/L (ref 9–17)
BASOPHILS # BLD: 1 % (ref 0–2)
BASOPHILS ABSOLUTE: 0.06 K/UL (ref 0–0.2)
BUN BLDV-MCNC: 12 MG/DL (ref 8–23)
CALCIUM SERPL-MCNC: 8.1 MG/DL (ref 8.6–10.4)
CHLORIDE BLD-SCNC: 97 MMOL/L (ref 98–107)
CO2: 23 MMOL/L (ref 20–31)
CREAT SERPL-MCNC: 0.77 MG/DL (ref 0.5–0.9)
EOSINOPHILS RELATIVE PERCENT: 1 % (ref 1–4)
GFR SERPL CREATININE-BSD FRML MDRD: >60 ML/MIN/1.73M2
GLUCOSE BLD-MCNC: 83 MG/DL (ref 70–99)
HCT VFR BLD CALC: 23.7 % (ref 36.3–47.1)
HEMOGLOBIN: 7.4 G/DL (ref 11.9–15.1)
IMMATURE GRANULOCYTES: 2 %
LYMPHOCYTES # BLD: 38 % (ref 24–43)
MCH RBC QN AUTO: 29.2 PG (ref 25.2–33.5)
MCHC RBC AUTO-ENTMCNC: 31.2 G/DL (ref 28.4–34.8)
MCV RBC AUTO: 93.7 FL (ref 82.6–102.9)
MONOCYTES # BLD: 13 % (ref 3–12)
NRBC AUTOMATED: 0 PER 100 WBC
OSMOLALITY URINE: 414 MOSM/KG (ref 80–1300)
PDW BLD-RTO: 15.5 % (ref 11.8–14.4)
PLATELET # BLD: 290 K/UL (ref 138–453)
PMV BLD AUTO: 10 FL (ref 8.1–13.5)
POTASSIUM SERPL-SCNC: 4.2 MMOL/L (ref 3.7–5.3)
RBC # BLD: 2.53 M/UL (ref 3.95–5.11)
RBC # BLD: ABNORMAL 10*6/UL
SEG NEUTROPHILS: 45 % (ref 36–65)
SEGMENTED NEUTROPHILS ABSOLUTE COUNT: 3.99 K/UL (ref 1.5–8.1)
SODIUM BLD-SCNC: 127 MMOL/L (ref 135–144)
SODIUM BLD-SCNC: 129 MMOL/L (ref 135–144)
SODIUM BLD-SCNC: 133 MMOL/L (ref 135–144)
SODIUM,UR: 122 MMOL/L
WBC # BLD: 8.8 K/UL (ref 3.5–11.3)

## 2023-01-15 PROCEDURE — 2060000000 HC ICU INTERMEDIATE R&B

## 2023-01-15 PROCEDURE — 6370000000 HC RX 637 (ALT 250 FOR IP)

## 2023-01-15 PROCEDURE — 2580000003 HC RX 258: Performed by: PSYCHIATRY & NEUROLOGY

## 2023-01-15 PROCEDURE — 84300 ASSAY OF URINE SODIUM: CPT

## 2023-01-15 PROCEDURE — 97535 SELF CARE MNGMENT TRAINING: CPT

## 2023-01-15 PROCEDURE — 6360000002 HC RX W HCPCS

## 2023-01-15 PROCEDURE — 80048 BASIC METABOLIC PNL TOTAL CA: CPT

## 2023-01-15 PROCEDURE — 99232 SBSQ HOSP IP/OBS MODERATE 35: CPT | Performed by: STUDENT IN AN ORGANIZED HEALTH CARE EDUCATION/TRAINING PROGRAM

## 2023-01-15 PROCEDURE — 83935 ASSAY OF URINE OSMOLALITY: CPT

## 2023-01-15 PROCEDURE — 97530 THERAPEUTIC ACTIVITIES: CPT

## 2023-01-15 PROCEDURE — 6370000000 HC RX 637 (ALT 250 FOR IP): Performed by: NURSE PRACTITIONER

## 2023-01-15 PROCEDURE — 99232 SBSQ HOSP IP/OBS MODERATE 35: CPT | Performed by: INTERNAL MEDICINE

## 2023-01-15 PROCEDURE — 2580000003 HC RX 258: Performed by: ANESTHESIOLOGY

## 2023-01-15 PROCEDURE — 6370000000 HC RX 637 (ALT 250 FOR IP): Performed by: STUDENT IN AN ORGANIZED HEALTH CARE EDUCATION/TRAINING PROGRAM

## 2023-01-15 PROCEDURE — 2580000003 HC RX 258: Performed by: STUDENT IN AN ORGANIZED HEALTH CARE EDUCATION/TRAINING PROGRAM

## 2023-01-15 PROCEDURE — 84295 ASSAY OF SERUM SODIUM: CPT

## 2023-01-15 PROCEDURE — 97110 THERAPEUTIC EXERCISES: CPT

## 2023-01-15 PROCEDURE — 85025 COMPLETE CBC W/AUTO DIFF WBC: CPT

## 2023-01-15 PROCEDURE — 2580000003 HC RX 258

## 2023-01-15 PROCEDURE — 36415 COLL VENOUS BLD VENIPUNCTURE: CPT

## 2023-01-15 RX ORDER — SODIUM CHLORIDE 1000 MG
2 TABLET, SOLUBLE MISCELLANEOUS ONCE
Status: COMPLETED | OUTPATIENT
Start: 2023-01-15 | End: 2023-01-15

## 2023-01-15 RX ADMIN — SODIUM CHLORIDE TAB 1 GM 2 G: 1 TAB at 14:05

## 2023-01-15 RX ADMIN — TRAMADOL HYDROCHLORIDE 50 MG: 50 TABLET, COATED ORAL at 20:48

## 2023-01-15 RX ADMIN — CLOPIDOGREL 75 MG: 75 TABLET, FILM COATED ORAL at 09:36

## 2023-01-15 RX ADMIN — Medication 81 MG: at 09:36

## 2023-01-15 RX ADMIN — SODIUM CHLORIDE, PRESERVATIVE FREE 10 ML: 5 INJECTION INTRAVENOUS at 09:36

## 2023-01-15 RX ADMIN — ENOXAPARIN SODIUM 40 MG: 100 INJECTION SUBCUTANEOUS at 09:36

## 2023-01-15 RX ADMIN — Medication 5 MG: at 20:47

## 2023-01-15 RX ADMIN — LEVOTHYROXINE SODIUM 75 MCG: 75 TABLET ORAL at 09:35

## 2023-01-15 ASSESSMENT — PAIN SCALES - GENERAL: PAINLEVEL_OUTOF10: 4

## 2023-01-15 ASSESSMENT — PAIN DESCRIPTION - ORIENTATION: ORIENTATION: RIGHT

## 2023-01-15 ASSESSMENT — PAIN DESCRIPTION - LOCATION: LOCATION: FOOT;ANKLE

## 2023-01-15 NOTE — DISCHARGE INSTRUCTIONS
Brain Aneurysm Repair: What to Expect at Home  Your Recovery     An aneurysm is a bulging, weak section of a blood vessel. Sometimes aneurysms put pressure on nerves. They can also bleed or break open (rupture). A procedure can repair an aneurysm in your brain. This can prevent strokes, bleeding, and brain damage. During the procedure, the doctor made a small cut (incision) in your groin or wrist. The doctor put a small tube (catheter) through the incision and used X-ray equipment to guide the catheter to the aneurysm in your brain. Then the doctor used a tool, such as a coil, to block the opening to the aneurysm. This prevents blood from entering the aneurysm. You may feel tired for a few days after the procedure. Aline Prescott probably be able to return to work or your normal routine in 3 to 7 days. You may have some bruising around the incision, but you should not have much pain. If you do have pain, your doctor may recommend or prescribe pain medicines. Your doctor will regularly check the site of your aneurysm. Some people need to have this procedure more than once. This care sheet gives you a general idea about how long it will take for you to recover. But each person recovers at a different pace. Follow the steps below to get better as quickly as possible. How can you care for yourself at home? Activity    Do not do strenuous exercise and do not lift, pull, or push anything heavy until your doctor says it is okay. This may be for several days. You can walk around the house and do light activity, such as cooking. If the catheter was placed in your groin, try not to walk up stairs for the first couple of days. If the catheter was placed in your wrist, do not bend your wrist deeply for the first couple of days. Be careful using your hand to get into and out of a chair or bed. Rest when you feel tired. Getting enough sleep will help you recover.    Diet    Follow your doctor's orders about how much fluid you should drink after the procedure. You can eat your normal diet. If your stomach is upset, try bland, low-fat foods like plain rice, broiled chicken, toast, and yogurt. Medicines    Your doctor will tell you if and when you can restart your medicines. Your doctor will also give you instructions about taking any new medicines. If you stopped taking aspirin or some other blood thinner, your doctor will tell you when to start taking it again. Be safe with medicines. Take pain medicines exactly as directed. If the doctor gave you a prescription medicine for pain, take it as prescribed. If you are not taking a prescription pain medicine, ask your doctor if you can take an over-the-counter medicine. If you think your pain medicine is making you sick to your stomach: Take your medicine after meals (unless your doctor has told you not to). Ask your doctor for a different pain medicine. Care of the catheter site    If you have strips of tape on the cut (incision) the doctor made, leave the tape on for a week or until it falls off. Put ice or a cold pack on the area for 10 to 20 minutes at a time to help with soreness or swelling. Put a thin cloth between the ice and your skin. You may shower 24 to 48 hours after the procedure, if your doctor okays it. Pat the incision dry. Do not soak the incision until it is healed. Don't take a bath for 1 week, or until your doctor tells you it is okay. Watch for bleeding from the incision. A small amount of blood (up to the size of a quarter) on the bandage can be normal.     If you are bleeding, lie down and press on the area for 15 minutes to try to make it stop. If the bleeding does not stop, call your doctor or seek immediate medical care. Follow-up care is a key part of your treatment and safety. Be sure to make and go to all appointments, and call your doctor if you are having problems.  It's also a good idea to know your test results and keep a list of the medicines you take. When should you call for help? Call 911 anytime you think you may need emergency care. For example, call if:    You passed out (lost consciousness). You have severe trouble breathing. You have sudden chest pain and shortness of breath, or you cough up blood. It is hard to think, move, speak, or see. Your body is jerking or shaking. Call your doctor now or seek immediate medical care if:    You have a fever with a stiff neck or a severe headache. You are bleeding from the area where the catheter was put in your artery. You have a fast-growing, painful lump at the catheter site. You have signs of infection, such as: Increased pain, swelling, warmth, or redness. Red streaks leading from the incision. Pus draining from the incision. Swollen lymph nodes in your neck, armpits, or groin. A fever. Your leg or hand is painful, looks blue or feels cold, numb, or tingly. You have any sudden vision changes. You have new or worse headaches. You fall and hit your head. You are sleeping more than you are awake. You have a headache and you throw up. You have pain that does not get better after you take pain medicine. You have a fever over 100°F. Watch closely for changes in your health, and be sure to contact your doctor if you have any problems. Where can you learn more? Go to http://www.woods.com/ and enter L241 to learn more about \"Brain Aneurysm Repair: What to Expect at Home. \"  Current as of: March 28, 2022               Content Version: 13.5  © 2006-2022 [employer]. Care instructions adapted under license by [facility] ([facility]). If you have questions about a medical condition or this instruction, always ask your healthcare professional. [place] [address] any warranty or liability for your use of this information.       Stroke symptoms include:  SUDDEN numbness or weakness of face, arm or leg, especially on one side of the body   SUDDEN confusion, trouble speaking, or understanding   SUDDEN trouble seeing in one or both eyes   SUDDEN trouble walking, dizziness, loss of balance or coordination   SUDDEN severe headache with no known cause   Call 9-1-1 immediately if you observe any of these symptoms. Note the time of the first symptom. This information is important and can affect treatment decisions.

## 2023-01-15 NOTE — PROGRESS NOTES
[facility]  Office: [phone]  Jorge Alberto Schroeder DO, Tip López DO, Dino Mari DO, Nick Suarez DO, Leah Lozada MD, Cary Godwin MD, Stephanie Thacker MD, Alyssa Khan MD,  Mckay Ramesh MD, Megan Nelson MD, Thomas Roland DO, Jennifer Mosley MD,  Les Álvarez DO, Sandy Garcia MD, Tyrone Hairston MD, Lukas Schroeder DO, Alia Vega MD, Minesh Reed MD, Mak Moe DO, Joan Quintana MD, Mary Mooney MD, Radha Garcia MD, Suzy Natarajan MD, Horacio Freeman DO, Ray Hirsch MD, Abhay Resendiz MD, Shirley Waterhouse, CNP,  Amber Shelton, CNP, Trang Marin, CNP, Noe Justin, CNP,  Rena Park, DNP, Michelle Servin, CNP, Lona Schafer, CNP, Kristal Bernal, CNP, Josephine Pang, CNP, Lynn Cee, CNP, LOTUS BeckerC, Jacquelyn Villa, CNS, Marleen Pierre, CNP, Aranza Worthy, CNP         [facility]   IN-PATIENT SERVICE   [facility]    Progress Note    1/15/2023    7:25 AM    Name:   Susu Locke  MRN:     [id number]     Acct:      [id number]   Room:   [facility] Day:  6  Admit Date:  1/9/2023  8:01 PM    PCP:   Horacio Landon MD  Code Status:  Full Code    Subjective:     C/C:   Chief Complaint   Patient presents with    Extremity Weakness    Altered Mental Status     Interval History Status: worsened.     Vitals reviewed, afebrile and hemodynamically stable.  Saturating well on room air.  Labs reviewed, sodium downtrending and hemoglobin stable.  Overnight patient had no significant events.    On examination patient resting comfortably in bed.  No complaints at this time.  Blood pressure was a little low.  No fluid bolus we will add midodrine if needed.    Brief History:     Mrs. Susu Locke is a 82-year-old female with significant past medical history of hypertension, CAD, PVD, hypothyroidism, osteopenia, osteoarthritis, DVT, CKD stage III who initially presented to outlying facility for physical therapy and was  unable to perform exercises due to flaccid paralysis of her left upper extremity and reduced sensation. She was noted to have significant hyponatremia of 116 and CTA showed left ICA aneurysm. Neurology and nephrology was consulted and patient was treated with hypertonic saline. Patient was taken for DSA on 1/11/2023 which confirmed a left ICA cavernous segment aneurysm and neurovascular neurosurgery decided to take patient for embolization of that aneurysm was transferred to neuro ICU. Patient was then transferred to medicine after neuro ICU monitoring following procedure. Review of Systems:     Constitutional:  negative for chills, fevers, sweats  Respiratory:  negative for cough, dyspnea on exertion, shortness of breath, wheezing  Cardiovascular:  negative for chest pain, chest pressure/discomfort, lower extremity edema, palpitations  Gastrointestinal:  negative for abdominal pain, constipation, diarrhea, nausea, vomiting  Neurological:  negative for dizziness, headache    Medications: Allergies: Allergies   Allergen Reactions    Doxycycline Other (See Comments)     GI upset.      Pcn [Penicillins] Hives    Pneumococcal Vaccines Hives     lg local reaction    Tetanus Toxoids        Current Meds:   Scheduled Meds:    vitamin D  50,000 Units Oral Weekly    sodium chloride flush  5-40 mL IntraVENous 2 times per day    sodium chloride flush  5-40 mL IntraVENous 2 times per day    sodium chloride flush  5-40 mL IntraVENous 2 times per day    clopidogrel  75 mg Oral Daily    aspirin EC  81 mg Oral Daily    levothyroxine  75 mcg Oral Daily    sodium chloride flush  5-40 mL IntraVENous 2 times per day    enoxaparin  40 mg SubCUTAneous Daily     Continuous Infusions:    sodium chloride      sodium chloride      sodium chloride      sodium chloride 20 mL/hr at 01/13/23 0545     PRN Meds: traMADol, sodium chloride flush, sodium chloride, sodium chloride flush, sodium chloride, ondansetron **OR** ondansetron, melatonin, sodium chloride flush, sodium chloride, sodium chloride flush, sodium chloride, polyethylene glycol, acetaminophen **OR** acetaminophen    Data:     Past Medical History:   has a past medical history of Arthritis, CAD (coronary artery disease), Edema, Hematuria, Hypotension, unspecified, Pure hypercholesterolemia, Specified congenital anomalies of hair, Unspecified hypothyroidism, and Unspecified vitamin D deficiency. Social History:   reports that she quit smoking about 14 years ago. Her smoking use included cigarettes. She has a 5.50 pack-year smoking history. She has never used smokeless tobacco. She reports that she does not drink alcohol and does not use drugs. Family History:   Family History   Adopted: Yes       Vitals:  BP (!) 112/59   Pulse 71   Temp 98.2 °F (36.8 °C)   Resp 23   Ht 5' 3\" (1.6 m)   Wt 123 lb 14.4 oz (56.2 kg)   SpO2 98%   BMI 21.95 kg/m²   Temp (24hrs), Av °F (36.7 °C), Min:97.8 °F (36.6 °C), Max:98.2 °F (36.8 °C)    Recent Labs     23  1616   POCGLU 109*       I/O (24Hr):     Intake/Output Summary (Last 24 hours) at 1/15/2023 0725  Last data filed at 1/15/2023 0300  Gross per 24 hour   Intake --   Output 600 ml   Net -600 ml       Labs:  Hematology:  Recent Labs     23  0603 23  0517 01/15/23  0239   WBC 9.3 8.7 8.8   RBC 2.61* 2.43* 2.53*   HGB 7.9* 7.2* 7.4*   HCT 22.6* 22.3* 23.7*   MCV 86.6 91.8 93.7   MCH 30.3 29.6 29.2   MCHC 35.0* 32.3 31.2   RDW 14.6* 15.2* 15.5*    270 290   MPV 10.3 10.2 10.0     Chemistry:  Recent Labs     23  1606 23  0517 01/15/23  0239   * 135 127*   K 4.0 3.9 4.2   CL 98 104 97*   CO2 21 22 23   GLUCOSE 93 84 83   BUN 12 12 12   CREATININE 0.90 0.82 0.77   ANIONGAP 12 9 7*   LABGLOM >60 >60 >60   CALCIUM 8.9 8.3* 8.1*     Recent Labs     23  1616   POCGLU 109*     ABG:  Lab Results   Component Value Date/Time    FIO2 INFORMATION NOT PROVIDED 2023 10:33 AM     No results found for: SPECIAL  No results found for: CULTURE    Radiology:  XR ANKLE RIGHT (MIN 3 VIEWS)    Result Date: 1/12/2023  Previous ORIF of lateral and medial malleolar fracture. No hardware complication. No acute osseous findings. XR CHEST PORTABLE    Result Date: 1/10/2023  No acute process. Stable chest x-ray. MRI BRAIN WO CONTRAST    Result Date: 1/10/2023  No acute infarct or other acute intracranial process. Suspected 8 mm medially projecting left cavernous segment ICA aneurysm. Nonemergent CTA head imaging recommended. Physical Examination:        General appearance:  alert, cooperative and no distress  Mental Status:  oriented to person, place and time and normal affect  Lungs:  clear to auscultation bilaterally, normal effort  Heart:  regular rate and rhythm, no murmur  Abdomen:  soft, nontender, nondistended, normal bowel sounds, no masses, hepatomegaly, splenomegaly  Extremities: Right ankle/foot with previous Ace wrap from fracture. No edema, redness, tenderness in the calves  Skin:  no gross lesions, rashes, induration    Assessment:        Hospital Problems             Last Modified POA    * (Principal) Hyponatremia 1/9/2023 Yes    Seizure-like activity ([address] 1/10/2023 Yes    Carotid aneurysm, left ([address] 1/10/2023 Yes    Altered mental status 1/10/2023 Yes    Cerebrovascular aneurysm 1/10/2023 Yes    TIA (transient ischemic attack) 1/10/2023 Yes    Low bicarbonate 1/13/2023 Yes    Hypothyroidism 1/10/2023 Yes    Overview Signed 5/5/2020 10:19 AM by Maria D Mohr MD     Consulted By Dr. Mabel Sidhu 2020         Pure hypercholesterolemia 1/10/2023 Yes    Vitamin D deficiency 1/10/2023 Yes    Arthritis 1/10/2023 Yes    Overview Addendum 5/5/2020 10:20 AM by Maria D Mohr MD     Knee ;   On Tramadol from rheumatology          CAD (coronary artery disease) 1/10/2023 Yes    Overview Signed 2/7/2017  8:47 AM by Maria D Mohr MD     Feb 2016 Nuc stress test.          Osteopenia 1/10/2023 Yes    Overview Addendum 2/2/2016 10:15 AM by Stuart Agosto MD     5/2014 DEXA was done by [facility]          History of DVT (deep vein thrombosis) 1/10/2023 Yes    Chronic renal failure, stage 3b ([address] 1/10/2023 Yes       Plan:        Hyponatremia. 127 today. We will repeat sodium every 6 hours and ensure patient is on 1200 mL fluid restriction. Discussed with nephrology. Left ICA aneurysm. Status post embolization. Continue aspirin 81 mg and Plavix 75 mg daily. Follow-up with endovascular in 2 weeks. Dr. Saw Cordero in 2 weeks and Dr. Andrea Tyson in 3 months. Anemia. Stable. CAD. Continue aspirin 81 mg daily. Hypothyroidism. Continue levothyroxine 75 mcg daily. Vitamin D deficiency. Vitamin D 50,000 units weekly x 8 weeks followed by 1000 units weekly thereafter. Osteoarthritis. Tylenol and Ultram as needed. Osteopenia. Continue vitamin D. History of DVT. On Lovenox for DVT prophylaxis. DVT prophylaxis: Lovenox 40 mg daily. GI prophylaxis: No indication at this time. Discharge planning: Awaiting pre-CERT at [facility]. Plan for discharge tomorrow.     Bruno Garza DO  1/15/2023  7:25 AM

## 2023-01-15 NOTE — PROGRESS NOTES
Physical Therapy  Facility/Department: [address] NEURO ICU  Daily Treatment note    Name: Susana Lewis  : 1940  MRN: [id number]  Date of Service: 1/15/2023    Discharge Recommendations:  Patient would benefit from continued therapy after discharge   PT Equipment Recommendations  Equipment Needed: No     Assessment   Body Structures, Functions, Activity Limitations Requiring Skilled Therapeutic Intervention: Decreased functional mobility ; Decreased tolerance to work activity; Decreased strength;Decreased endurance;Decreased balance;Decreased fine motor control  Assessment: The pt very motivated to improve, able to tolerated standing x4min in SS, limited by NWB RLE, and pt unable  to hop with RW this date . She will continue to Benefit from more therapy to addresss deficits. fro  Therapy Prognosis: Good  Requires PT Follow-Up: Yes  Activity Tolerance  Activity Tolerance: Patient tolerated treatment well;Patient limited by fatigue     Plan   Physcial Therapy Plan  General Plan: 5-6x/week  Current Treatment Recommendations: Strengthening, Balance training, Functional mobility training, Transfer training, Neuromuscular re-education, Gait training, Endurance training, Therapeutic activities  Safety Devices  Type of Devices:  All fall risk precautions in place, Call light within reach, Nurse notified, Left in bed  Restraints  Restraints Initially in Place: No     Restrictions  Restrictions/Precautions  Restrictions/Precautions: Up as Tolerated, Weight Bearing, Seizure, Surgical Protocols, Fall Risk  Required Braces or Orthoses?: No  Lower Extremity Weight Bearing Restrictions  Right Lower Extremity Weight Bearing: Non Weight Bearing  Partial Weight Bearing Percentage Or Pounds: RLE NWB s/p ORIF Ankle  Left Lower Extremity Weight Bearing: Weight Bearing As Tolerated  Position Activity Restriction  Other position/activity restrictions: Restrictions for Pt include:  SBP Goal 100-140 (per Endovascular neurosurgery)  // RLE NWB (per Pt, s/p ORIF). Subjective   General  Patient assessed for rehabilitation services?: Yes  Response To Previous Treatment: Patient with no complaints from previous session. Follows Commands: Within Functional Limits  Subjective  Subjective: PT at EOB with CARMEN upon arrival , pleasant and cooperatove t/o. denies pain        Cognition   Orientation  Overall Orientation Status: Within Functional Limits  Orientation Level: Oriented X4  Cognition  Overall Cognitive Status: WFL  Following Commands: Follows multistep commands with increased time; Follows multistep commands with repitition  Attention Span: Attends with cues to redirect  Initiation: Requires cues for some  Sequencing: Requires cues for some     Objective     Bed mobility  Bed Mobility Comments: Pt at EOB upon arriva l , retired to recliner at end of session  Transfers  Sit to Stand: Minimal Assistance  Stand to Sit: Minimal Assistance  Bed to Chair: Dependent/Total (SS)      Balance  Posture: Good  Sitting - Static: Good  Sitting - Dynamic: Good  Standing - Static: Fair;-  Standing - Dynamic: Fair;-  Comments: Standing asses in SS. Pt able to stand x4 min in SS with SBA for safety. Exercise Treatment: Seated LE exercise program: Long Arc Quads, hip abduction/adduction, heel/toe raises, and marches.  Reps: x20 reps    AM-PAC Score  AM-PAC Inpatient Mobility Raw Score : 13 (01/15/23 1412)  AM-PAC Inpatient T-Scale Score : 36.74 (01/15/23 1412)  Mobility Inpatient CMS 0-100% Score: 64.91 (01/15/23 1412)  Mobility Inpatient CMS G-Code Modifier : CL (01/15/23 1412)     Goals  Short Term Goals  Time Frame for Short Term Goals: 10  Short Term Goal 1: Pt to stand pivot transfer from bed to WC NWB rigth with AD MinAx1  Short Term Goal 2: Pt to demo independent bed mobilty without railing  Short Term Goal 3: Pt to self propel 25 ft with manaual WC with supervision  Patient Goals   Patient Goals : to walk       Education  Patient Education  Education Given To: Patient  Education Provided: Role of Therapy;Transfer Training;Equipment;Plan of Care  Education Provided Comments: education and practical instruction on STS, hand and foot placement and maintaining WB status  Education Method: Demonstration  Education Outcome: Verbalized understanding;Continued education needed      Therapy Time   Individual Concurrent Group Co-treatment   Time In 0955         Time Out 1020         Minutes 25         Timed Code Treatment Minutes: [address], PTA

## 2023-01-15 NOTE — PROGRESS NOTES
Endovascular Neurosurgery Progress Note    SUBJECTIVE:   No acute overnight events reported. No headache last night     Review of Systems:  CONSTITUTIONAL:  negative for fevers, chills, fatigue and malaise    EYES:  negative for double vision, blurred vision and photophobia     HEENT:  negative for tinnitus, epistaxis and sore throat    RESPIRATORY:  negative for cough, shortness of breath, wheezing    CARDIOVASCULAR:  negative for chest pain, palpitations, syncope, edema    GASTROINTESTINAL:  negative for nausea, vomiting    GENITOURINARY:  negative for incontinence    MUSCULOSKELETAL:  negative for neck or back pain    NEUROLOGICAL:  Positive for headache, visual floaters that patient states are a chronic problem  Negative for weakness and tingling  negative for dizziness    PSYCHIATRIC:  negative for anxiety      Review of systems otherwise negative. OBJECTIVE:     Vitals:    01/15/23 0400   BP: (!) 112/59   Pulse: 71   Resp: 23   Temp: 98.2 °F (36.8 °C)   SpO2:         General:  Gen: normal habitus, NAD  HEENT: NCAT, mucosa moist  Cvs: RRR, S1 S2 normal  Resp: symmetric unlabored breathing  Abd: s/nd/nt  Ext: no edema  Skin: no lesions seen, warm and dry     Neuro:  Gen: awake and alert, oriented x3. Lang/speech: no aphasia or dysarthria. Follows commands. CN: PERRL, EOMI, VFF, V1-3 intact, face symmetric, hearing intact, shoulder shrug symmetric, tongue midline  Motor: grossly 5/5 UE and LE b/l  Sense: LT intact in all 4 ext. Coord: FTN and HTS intact b/l  DTR: deferred  Gait: deferred    NIH Stroke Scale:   1a  Level of consciousness: 0 - alert; keenly responsive   1b. LOC questions:  0 - answers both questions correctly   1c. LOC commands: 0 - performs both tasks correctly   2. Best Gaze: 0 - normal   3. Visual: 0 - no visual loss   4. Facial Palsy: 1 - minor paralysis (flattened nasolabial fold, asymmetric on smiling)   5a.  Motor left arm: 0 - no drift, limb holds 90 (or 45) degrees for full 10 seconds   5b. Motor right arm: 0 - no drift, limb holds 90 (or 45) degrees for full 10 seconds   6a. Motor left le - no drift; leg holds 30 degree position for full 5 seconds   6b  Motor right le - no drift; leg holds 30 degree position for full 5 seconds   7. Limb Ataxia: 0 - absent   8. Sensory: 0 - normal; no sensory loss   9. Best Language:  0 - no aphasia, normal   10. Dysarthria: 0 - normal   11. Extinction and Inattention: 0 - no abnormality         Total:   1     LABS:   Reviewed. Lab Results   Component Value Date    HGB 7.4 (L) 01/15/2023    WBC 8.8 01/15/2023     01/15/2023     (L) 01/15/2023    BUN 12 01/15/2023    CREATININE 0.77 01/15/2023    AST 41 (H) 2023    ALT 22 2023    MG 1.5 (L) 01/10/2023    INR 1.0 2023      No results found for: COVID19    RADIOLOGY:   Images were personally reviewed including:      IR :   1. Left ICA medially oriented cavernous segment aneurysm with measurements?neck 7.64 mm, length 10.69 mm, width 6.98 mm, height 8.99 mm? 2. The above lesion was treated with 6 fr long sheath, Phenom-27 microcatheter, Pipeline embolization device 5x20mm. Sceptor 4 x11 mm balloon was then used to achieve balloon angioplasty to achieve appropriate wall apposition the flow diverter. 3.   Well apposed flow diverter, Annamaria Adamsos score-3? 4.    Left dorsal ophthalmic artery             Diagnostic Cerebral Angiogram done on 2023  Left ICA wide necked, medially oriented, side wall, cavernous segment aneurysm with measurements neck 7.64 mm, length 10.69 mm, width 6.98 mm, height 8.99 mm   Right fetal PCA   Left subclavian artery origin occlusion         CTA head done on  2023 in an outside hospital: left ICA cavernous aneurysm 8mm     MRI brain 1/10/2023: no acute changes    ASSESSMENT:   [de-identified]year old woman with CKD stage 3, hypothyroidism, fell 3 weeks ago and broke her right foot, p/w acute left hemiparesis that resolved by the time she is in the ED. 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 MRI brain is neg for stroke  - s/p left ICA cavernous segment aneurysm pipeline embolization on 1/13/2023  - bilateral groin without active bleeding and intact peripheral pulses  - hyponatremia improving Na 127 today  - hemoglobin 7.2 today    PLAN:   - s/p left ICA cavernous segment aneurysm pipeline embolization on 01/12  - SBP goal 100-140  - continue with aspirin and Plavix  - Bed hold at [facility] needs pre-cert  - follow up with Dr. Jeyson Estrada 2 weeks after discharge and Dr. Miguelangel Mcintosh 3 months after discharge     Case discussed with Dr. Miguelangel Mcintosh attending.     Mariam Phillips MD  Stroke, [facility]  [address] [place]  Electronically signed 1/15/2023 at 8:02 AM

## 2023-01-15 NOTE — PLAN OF CARE
Problem: Discharge Planning  Goal: Discharge to home or other facility with appropriate resources  1/15/2023 1716 by Leslie Hawley RN  Outcome: Progressing  1/15/2023 0333 by Rad Huitron RN  Outcome: Progressing     Problem: Pain  Goal: Verbalizes/displays adequate comfort level or baseline comfort level  1/15/2023 1716 by Leslie Hawley RN  Outcome: Progressing  1/15/2023 0333 by Rad Huitron RN  Outcome: Progressing     Problem: Confusion  Goal: Confusion, delirium, dementia, or psychosis is improved or at baseline  Description: INTERVENTIONS:  1. Assess for possible contributors to thought disturbance, including medications, impaired vision or hearing, underlying metabolic abnormalities, dehydration, psychiatric diagnoses, and notify attending LIP  2. [place] high risk fall precautions, as indicated  3. Provide frequent short contacts to provide reality reorientation, refocusing and direction  4. Decrease environmental stimuli, including noise as appropriate  5. Monitor and intervene to maintain adequate nutrition, hydration, elimination, sleep and activity  6. If unable to ensure safety without constant attention obtain sitter and review sitter guidelines with assigned personnel  7.  Initiate Psychosocial CNS and Spiritual Care consult, as indicated  1/15/2023 1716 by Leslie Hawley RN  Outcome: Progressing  1/15/2023 0333 by Rad Huitron RN  Outcome: Progressing     Problem: Safety - Adult  Goal: Free from fall injury  1/15/2023 1716 by Leslie Hawley RN  Outcome: Progressing  1/15/2023 0333 by Rad Huitron RN  Outcome: Progressing     Problem: ABCDS Injury Assessment  Goal: Absence of physical injury  1/15/2023 1716 by Leslie Hawley RN  Outcome: Progressing  Flowsheets (Taken 1/15/2023 0800)  Absence of Physical Injury: Implement safety measures based on patient assessment  1/15/2023 0333 by Leigh Kelley ELIAZAR Soriano  Outcome: Progressing     Problem: Skin/Tissue Integrity  Goal: Absence of new skin breakdown  Description: 1.  Monitor for areas of redness and/or skin breakdown  2.  Assess vascular access sites hourly  3.  Every 4-6 hours minimum:  Change oxygen saturation probe site  4.  Every 4-6 hours:  If on nasal continuous positive airway pressure, respiratory therapy assess nares and determine need for appliance change or resting period.  1/15/2023 1716 by Mildred Saldaña RN  Outcome: Progressing  1/15/2023 0333 by Noemí Soriano RN  Outcome: Progressing

## 2023-01-15 NOTE — PROGRESS NOTES
Nephrology Progress Note    Patient:  Yohana Gomez; [de-identified] y.o. MRN# [id number]  Location:  0115/0115-01  Attending:  Bouchra Zuniga DO  Admit Date:  2023   Hospital Day: 6    Subjective   Patient  admitted on 2023 for AMS, strokelike presentation underwent DSA on 2023 which confirmed left ICA cavernous segment aneurysm. Patient went for embolization of aneurysm. We have been consulted for hyponatremia which did resolve in the 131 range, we were reconsulted on 1/15/2023 for hyponatremia    Patient seen and evaluated in room, patient with some episodes of hypotension overnight    Sodium level decreased again today currently 127 down from 131 on 2023, repeat level 133. There is a spiked bag of normal saline at the bedside although there is no documentation in the [facility]. Patient did have one half normal saline that was discontinued on 2023 cells with last documented IV fluids.   I would suspect IV fluids were running overnight for hypotension      Recent Labs     23  1606 23  0517 01/15/23  0239 01/15/23  1044   * 135 127* 133*   K 4.0 3.9 4.2  --    CL 98 104 97*  --    CO2 21 22 23  --    BUN 12 12 12  --    CREATININE 0.90 0.82 0.77  --    GLUCOSE 93 84 83  --    CALCIUM 8.9 8.3* 8.1*  --        Objective   VS: /65   Pulse 79   Temp 98.2 °F (36.8 °C)   Resp 19   Ht 5' 3\" (1.6 m)   Wt 123 lb 14.4 oz (56.2 kg)   SpO2 100%   BMI 21.95 kg/m²   MAXIMUM TEMPERATURE OVER 24 HRS:  Temp (24hrs), Av °F (36.7 °C), Min:97.8 °F (36.6 °C), Max:98.2 °F (36.8 °C)    24 HR BLOOD PRESSURE RANGE:  Systolic (06AOL), RVS:036 , Min:70 , MERY:895   ; Diastolic (19OVP), CJE:23, Min:47, Max:65    24 HR INTAKE/OUTPUT:    Intake/Output Summary (Last 24 hours) at 1/15/2023 1201  Last data filed at 1/15/2023 0300  Gross per 24 hour   Intake --   Output 600 ml   Net -600 ml     WEIGHT: Patient Vitals for the past 96 hrs (Last 3 readings):   Weight   23 0456 123 lb 14.4 oz (56.2 kg) Current Medications    Scheduled Meds:    sodium chloride  2 g Oral Once    vitamin D  50,000 Units Oral Weekly    sodium chloride flush  5-40 mL IntraVENous 2 times per day    sodium chloride flush  5-40 mL IntraVENous 2 times per day    sodium chloride flush  5-40 mL IntraVENous 2 times per day    clopidogrel  75 mg Oral Daily    aspirin EC  81 mg Oral Daily    levothyroxine  75 mcg Oral Daily    sodium chloride flush  5-40 mL IntraVENous 2 times per day    enoxaparin  40 mg SubCUTAneous Daily     Continuous Infusions:    sodium chloride      sodium chloride      sodium chloride      sodium chloride 20 mL/hr at 01/13/23 0545     PRN Meds:  traMADol, sodium chloride flush, sodium chloride, sodium chloride flush, sodium chloride, ondansetron **OR** ondansetron, melatonin, sodium chloride flush, sodium chloride, sodium chloride flush, sodium chloride, polyethylene glycol, acetaminophen **OR** acetaminophen    Physical Examination     General:  AAO x 3, speaking in full sentences, no accessory muscle use. Chest:   Bilateral vesicular breath sounds, no rales or wheezes. Cardiac:  S1 S2 RR, no murmurs, gallops or rubs, JVP not raised. Abdomen: Soft, non-tender, non distended, BS audible. SKIN:  No rashes, good skin turgor. Extremities:  No edema, no clubbing, No cyanosis, thin limbs with muscle wasting, right lower extremity in surgical dressing  Neuro:  AAO x 3, No FND.      Labs      Recent Labs     01/13/23  0603 01/14/23  0517 01/15/23  0239   WBC 9.3 8.7 8.8   RBC 2.61* 2.43* 2.53*   HGB 7.9* 7.2* 7.4*   HCT 22.6* 22.3* 23.7*   MCV 86.6 91.8 93.7   MCH 30.3 29.6 29.2   MCHC 35.0* 32.3 31.2   RDW 14.6* 15.2* 15.5*    270 290   MPV 10.3 10.2 10.0      BMP:   Recent Labs     01/13/23  1606 01/14/23  0517 01/15/23  0239 01/15/23  1044   * 135 127* 133*   K 4.0 3.9 4.2  --    CL 98 104 97*  --    CO2 21 22 23  --    BUN 12 12 12  --    CREATININE 0.90 0.82 0.77  --    GLUCOSE 93 84 83  -- CALCIUM 8.9 8.3* 8.1*  --         Urinalysis/Chemistries      Lab Results   Component Value Date/Time    NITRU NEGATIVE 01/09/2023 08:31 PM    COLORU Yellow 01/09/2023 08:31 PM    PHUR 7.5 01/09/2023 08:31 PM    WBCUA None 01/09/2023 08:31 PM    RBCUA 50  01/09/2023 08:31 PM    CLARITYU clear 09/21/2022 10:13 AM    SPECGRAV 1.042 01/09/2023 08:31 PM    LEUKOCYTESUR NEGATIVE 01/09/2023 08:31 PM    UROBILINOGEN Normal 01/09/2023 08:31 PM    BILIRUBINUR NEGATIVE 01/09/2023 08:31 PM    BILIRUBINUR neg 09/21/2022 10:13 AM    BLOODU moderate** 09/21/2022 10:13 AM    GLUCOSEU NEGATIVE 01/09/2023 08:31 PM    KETUA MODERATE 01/09/2023 08:31 PM       Radiology    XR ANKLE RIGHT (MIN 3 VIEWS)    Result Date: 1/12/2023  Previous ORIF of lateral and medial malleolar fracture. No hardware complication. No acute osseous findings. XR CHEST PORTABLE    Result Date: 1/10/2023  No acute process. Stable chest x-ray. MRI BRAIN WO CONTRAST    Result Date: 1/10/2023  No acute infarct or other acute intracranial process. Suspected 8 mm medially projecting left cavernous segment ICA aneurysm. Nonemergent CTA head imaging recommended. Assessment    Symptomatic hyponatremia: Resolved and sodium slowly and gradually corrected. Most recent sodium is 132  Hyponatremia most likely due to increase in circulating ADH  Metabolic acidosis: Improving and bicarbonate is up to 18-19. Patient has normal anion gap acidosis  Confusion: Improved     Plan   Continue free water restriction  Avoid IV fluid resuscitations. Recheck sodium level in 12 hrs  Will likely sign off within the next 24 hours if sodium level continues to remain within normal limits    Nutrition   Renal Diet/TF      Thank you. Please call with any questions. GAURAV Leroy - NP     [facility].     Attending Physician Statement  I have discussed the care of Cas Riley, including pertinent history and exam findings with the NP I have reviewed the key elements of all parts of the encounter with the np. Note was updated and recorded changes were made I have seen and examined the patient with the np. I agree with the assessment and plan and status of the problem list as documented.    Repeat Urine studies   Caron Lopez MD

## 2023-01-15 NOTE — PROGRESS NOTES
Occupational Therapy  Facility/Department: [address] NEURO ICU  Occupational Therapy Daily Treatment Note      Name: Kiley Smith  : 1940  MRN: [id number]  Date of Service: 1/15/2023    Discharge Recommendations: Further therapy recommended at discharge. The patient should be able to tolerate at least 3 hours of therapy per day over 5 days or 15 hours over 7 days. This patient may benefit from a Physical Medicine and Rehab consult. Patient would benefit from continued therapy after discharge       Patient Diagnosis(es): The primary encounter diagnosis was Altered mental status, unspecified altered mental status type. A diagnosis of Hyponatremia was also pertinent to this visit. Past Medical History:  has a past medical history of Arthritis, CAD (coronary artery disease), Edema, Hematuria, Hypotension, unspecified, Pure hypercholesterolemia, Specified congenital anomalies of hair, Unspecified hypothyroidism, and Unspecified vitamin D deficiency. Past Surgical History:  has a past surgical history that includes Cardiac surgery (); Colonoscopy (2008); Cataract removal with implant (Bilateral, 2016); Colonoscopy (2018); and Carotid angioplasty (Bilateral, 2023). Treatment Diagnosis: Left ICA Cavernous Segment Aneurysm Pipeline Embolization. Assessment   Performance deficits / Impairments: Decreased functional mobility ; Decreased endurance;Decreased coordination;Decreased ADL status; Decreased balance;Decreased strength;Decreased cognition;Decreased safe awareness;Decreased high-level IADLs;Decreased posture  Assessment: Pt will benefit from continued acute and post acute care OT to address the above listed deficits. Treatment Diagnosis: Left ICA Cavernous Segment Aneurysm Pipeline Embolization.   Prognosis: Good  Activity Tolerance  Activity Tolerance: Patient Tolerated treatment well        Plan   Occupational Therapy Plan  Times Per Week: 4-6x/week  (Neuro)  Times Per Day: Once a day  Current Treatment Recommendations: Strengthening, Balance training, Functional mobility training, Endurance training, Neuromuscular re-education, Equipment evaluation, education, & procurement, Patient/Caregiver education & training, Safety education & training, Cognitive reorientation, Self-Care / ADL, Home management training, Cognitive/Perceptual training, Coordination training     Restrictions  Restrictions/Precautions  Restrictions/Precautions: Up as Tolerated, Weight Bearing, Seizure, Surgical Protocols, Fall Risk  Required Braces or Orthoses?: No  Lower Extremity Weight Bearing Restrictions  Right Lower Extremity Weight Bearing: Non Weight Bearing  Partial Weight Bearing Percentage Or Pounds: RLE NWB (s/p ORIF Ankle 12/28/22)  Left Lower Extremity Weight Bearing: Weight Bearing As Tolerated  Position Activity Restriction  Other position/activity restrictions: SBP Goal 100-140 (per Endovascular neurosurgery), RLE NWB (s/p ORIF 12/28/22). Left ICA Cavernous Segment Aneurysm Pipeline Embolization. Subjective   General  Patient assessed for rehabilitation services?: Yes  Diagnosis: [de-identified]year old woman who fell 3 weeks ago, subsequently broke RIGHT ankle, underwent RIGHT ANKLE ORIF on 12/28/2022, was DC'd to SNF for Rehab. Pt does not currently have RLE weight-bearing orders in chart, but reports that she is strict RLE NWB. Pt then returned to ED on 1/9/2023 with Acute Left Hemiparesis that resolved by the time she was in the ED; was found to have hyponatremia of 116 and CTA showed Left ICA Cavernous Aneurysm of 8mm. MRI brain was neg for stroke. On 1/13/2023 Pt underwent Left ICA Cavernous Segment Aneurysm Pipeline Embolization. Restrictions for Pt include:  SBP Goal 100-140 (per Endovascular neurosurgery), RLE NWB (per Pt, s/p ORIF). Subjective  Subjective: RN approved Pt to be seen for OT tx. General Comment  Comments: Pt was agreeable and cooperative.   Pt denied having pain or discomfort, denied dizziness or lightheadedness during activty. Objective   SpO2: 98 %  Safety Devices  Type of Devices: All fall risk precautions in place;Call light within reach;Nurse notified; Left in chair  Bed Mobility Training  Bed Mobility Training: Yes  Overall Level of Assistance: Supervision  Supine to Sit: Supervision  Sit to Supine: Supervision  Scooting: Supervision  Balance  Sitting: Without support (seated at EOB ~70 min SBA)  Standing: With support (stood for ~ 1 min total w/SS and Min A)  Transfer Training  Overall Level of Assistance: Minimum assistance; Additional time; Adaptive equipment;Assist X1  Interventions: Visual cues; Verbal cues; Tactile cues; Safety awareness training  Sit to Stand: Minimum assistance; Additional time; Adaptive equipment;Assist X1  Stand to Sit: Adaptive equipment; Additional time;Minimum assistance;Assist X1        ADL  Feeding: Stand by assistance;Setup; Increased time to complete (able to open most containers and feed self)  Grooming: Verbal cueing;Setup; Increased time to complete;Minimal assistance (SBA-wash face, brush teeth/hair, Min A-wash/dry hair)  UE Bathing: Stand by assistance;Setup;Verbal cueing; Increased time to complete (assist to wash back)  LE Bathing: Moderate assistance;Setup;Verbal cueing; Increased time to complete (assist to wash lower legs and feet)  UE Dressing: Stand by assistance;Setup;Verbal cueing; Increased time to complete (assist to snap, thread arms and tie gown)  LE Dressing: Maximum assistance;Setup;Verbal cueing; Increased time to complete (assist to doff/don footie on left foot)  Toileting: Maximum assistance;Setup; Increased time to complete (using bed pan and needs assist to wipe)       Pt in bed upon arrival. Transferred to EOB w/supervision and setup at tray table for self care (see above for LOF). Pt needed increased time and assist d/t fatigue, rest breaks and limitations w/reaching lower leg/foot on left side.  No recliner in room and writer retrieved one from an empty room and placed in pt's room. STS w/min assist w/RW, pt unable to hop to get to chair next to bed. SS retrieved and used to transfer from EOB to recliner. Educ on AE/DME, EC/WS tech w/ADL's and pt verbalized understanding. Call light and phone in reach w/all needs met. Cognition  Overall Cognitive Status: WFL  Orientation  Overall Orientation Status: Within Functional Limits  Orientation Level: Oriented X4  Education Given To: Patient;Staff  Education Provided: Role of Therapy;Plan of Care;Precautions; Equipment;ADL Adaptive Strategies;Transfer Training; Fall Prevention Strategies; Energy Conservation  Education Method: Demonstration;Verbal  Barriers to Learning: None  Education Outcome: Verbalized understanding;Demonstrated understanding;Continued education needed     AM-PAC Score  AM-[facility] Inpatient Daily Activity Raw Score: 15 (01/15/23 0949)  AM-PAC Inpatient ADL T-Scale Score : 34.69 (01/15/23 2027)  ADL Inpatient CMS 0-100% Score: 56.46 (01/15/23 0149)  ADL Inpatient CMS G-Code Modifier : CK (01/15/23 6013)     Goals  Short Term Goals  Time Frame for Short Term Goals: By Discharge -  Short Term Goal 1: Pt will demo Mod I and Good Integration of EC // Ax pacing during UB ADLs. Short Term Goal 2: Pt will demo Mod I and Good Integration of AE // DME during LB ADLs. Short Term Goal 3: Pt will participate in ADL & Bathroom Transfers with SBA and Fair+ Carryover of RLE NWB. Short Term Goal 4: Pt will participate in Short Distance in-room mobility with CGA and Correct Use of AD // DME.        Therapy Time   Individual Concurrent Group Co-treatment   Time In 0825         Time Out 0950         Minutes 85         Timed Code Treatment Minutes: Hector Johns 157 A DAMIAN, HARDEN/L

## 2023-01-16 LAB
ANION GAP SERPL CALCULATED.3IONS-SCNC: 10 MMOL/L (ref 9–17)
BUN BLDV-MCNC: 11 MG/DL (ref 8–23)
CALCIUM SERPL-MCNC: 8.5 MG/DL (ref 8.6–10.4)
CHLORIDE BLD-SCNC: 99 MMOL/L (ref 98–107)
CO2: 25 MMOL/L (ref 20–31)
CREAT SERPL-MCNC: 0.78 MG/DL (ref 0.5–0.9)
GFR SERPL CREATININE-BSD FRML MDRD: >60 ML/MIN/1.73M2
GLUCOSE BLD-MCNC: 87 MG/DL (ref 70–99)
POTASSIUM SERPL-SCNC: 4.6 MMOL/L (ref 3.7–5.3)
SODIUM BLD-SCNC: 131 MMOL/L (ref 135–144)
SODIUM BLD-SCNC: 132 MMOL/L (ref 135–144)
SODIUM BLD-SCNC: 134 MMOL/L (ref 135–144)

## 2023-01-16 PROCEDURE — 99232 SBSQ HOSP IP/OBS MODERATE 35: CPT | Performed by: INTERNAL MEDICINE

## 2023-01-16 PROCEDURE — 2580000003 HC RX 258

## 2023-01-16 PROCEDURE — 80048 BASIC METABOLIC PNL TOTAL CA: CPT

## 2023-01-16 PROCEDURE — 99232 SBSQ HOSP IP/OBS MODERATE 35: CPT | Performed by: STUDENT IN AN ORGANIZED HEALTH CARE EDUCATION/TRAINING PROGRAM

## 2023-01-16 PROCEDURE — 6370000000 HC RX 637 (ALT 250 FOR IP)

## 2023-01-16 PROCEDURE — 6370000000 HC RX 637 (ALT 250 FOR IP): Performed by: STUDENT IN AN ORGANIZED HEALTH CARE EDUCATION/TRAINING PROGRAM

## 2023-01-16 PROCEDURE — 84295 ASSAY OF SERUM SODIUM: CPT

## 2023-01-16 PROCEDURE — 6360000002 HC RX W HCPCS

## 2023-01-16 PROCEDURE — 94761 N-INVAS EAR/PLS OXIMETRY MLT: CPT

## 2023-01-16 PROCEDURE — 6370000000 HC RX 637 (ALT 250 FOR IP): Performed by: NURSE PRACTITIONER

## 2023-01-16 PROCEDURE — 2060000000 HC ICU INTERMEDIATE R&B

## 2023-01-16 PROCEDURE — 36415 COLL VENOUS BLD VENIPUNCTURE: CPT

## 2023-01-16 RX ORDER — MIDODRINE HYDROCHLORIDE 5 MG/1
5 TABLET ORAL 3 TIMES DAILY PRN
Status: DISCONTINUED | OUTPATIENT
Start: 2023-01-16 | End: 2023-01-17 | Stop reason: HOSPADM

## 2023-01-16 RX ADMIN — Medication 81 MG: at 09:54

## 2023-01-16 RX ADMIN — Medication 5 MG: at 20:45

## 2023-01-16 RX ADMIN — LEVOTHYROXINE SODIUM 75 MCG: 75 TABLET ORAL at 09:54

## 2023-01-16 RX ADMIN — ACETAMINOPHEN 650 MG: 325 TABLET ORAL at 20:45

## 2023-01-16 RX ADMIN — TRAMADOL HYDROCHLORIDE 50 MG: 50 TABLET, COATED ORAL at 11:34

## 2023-01-16 RX ADMIN — MIDODRINE HYDROCHLORIDE 5 MG: 5 TABLET ORAL at 18:29

## 2023-01-16 RX ADMIN — SODIUM CHLORIDE, PRESERVATIVE FREE 10 ML: 5 INJECTION INTRAVENOUS at 09:53

## 2023-01-16 RX ADMIN — ENOXAPARIN SODIUM 40 MG: 100 INJECTION SUBCUTANEOUS at 09:54

## 2023-01-16 RX ADMIN — TRAMADOL HYDROCHLORIDE 50 MG: 50 TABLET, COATED ORAL at 20:45

## 2023-01-16 RX ADMIN — CLOPIDOGREL 75 MG: 75 TABLET, FILM COATED ORAL at 09:54

## 2023-01-16 ASSESSMENT — PAIN DESCRIPTION - ORIENTATION
ORIENTATION: RIGHT
ORIENTATION: RIGHT

## 2023-01-16 ASSESSMENT — PAIN DESCRIPTION - LOCATION
LOCATION: FOOT
LOCATION: FOOT

## 2023-01-16 ASSESSMENT — PAIN SCALES - GENERAL
PAINLEVEL_OUTOF10: 0
PAINLEVEL_OUTOF10: 5
PAINLEVEL_OUTOF10: 6
PAINLEVEL_OUTOF10: 0

## 2023-01-16 ASSESSMENT — PAIN DESCRIPTION - DESCRIPTORS
DESCRIPTORS: ACHING;DISCOMFORT
DESCRIPTORS: THROBBING

## 2023-01-16 NOTE — CARE COORDINATION
CM called and left message requesting call back for [facility] admissions. Requested update on the status of patients precert. CM faxed updated PT/OT notes from the weekend to [facility] at [phone].    1245- CM called and left message for [facility] admissions requesting call back.    1315- Call received from [place] with [facility] stating they still do not have precert. CM called and gave update to patient's daughter Darleen.

## 2023-01-16 NOTE — PLAN OF CARE
Problem: Discharge Planning  Goal: Discharge to home or other facility with appropriate resources  1/16/2023 0359 by Mirlande Gross RN  Outcome: Progressing  1/15/2023 1716 by Prieto Gould RN  Outcome: Progressing     Problem: Pain  Goal: Verbalizes/displays adequate comfort level or baseline comfort level  1/16/2023 0359 by Mirlande Gross RN  Outcome: Progressing  1/15/2023 1716 by Prieto Gould RN  Outcome: Progressing     Problem: Confusion  Goal: Confusion, delirium, dementia, or psychosis is improved or at baseline  Description: INTERVENTIONS:  1. Assess for possible contributors to thought disturbance, including medications, impaired vision or hearing, underlying metabolic abnormalities, dehydration, psychiatric diagnoses, and notify attending LIP  2. [place] high risk fall precautions, as indicated  3. Provide frequent short contacts to provide reality reorientation, refocusing and direction  4. Decrease environmental stimuli, including noise as appropriate  5. Monitor and intervene to maintain adequate nutrition, hydration, elimination, sleep and activity  6. If unable to ensure safety without constant attention obtain sitter and review sitter guidelines with assigned personnel  7.  Initiate Psychosocial CNS and Spiritual Care consult, as indicated  1/16/2023 0359 by Mirlande Gross RN  Outcome: Progressing  1/15/2023 1716 by Prieto Gould RN  Outcome: Progressing     Problem: Safety - Adult  Goal: Free from fall injury  1/16/2023 0359 by Mirlande Gross RN  Outcome: Progressing  1/15/2023 1716 by Prieto Gould RN  Outcome: Progressing     Problem: ABCDS Injury Assessment  Goal: Absence of physical injury  1/16/2023 0359 by Mirlande Gross RN  Outcome: Progressing  1/15/2023 1716 by Prieto Gould RN  Outcome: Progressing  Flowsheets (Taken 1/15/2023 0800)  Absence of Physical Injury: Implement safety measures based on patient assessment Problem: Skin/Tissue Integrity  Goal: Absence of new skin breakdown  Description: 1. Monitor for areas of redness and/or skin breakdown  2. Assess vascular access sites hourly  3. Every 4-6 hours minimum:  Change oxygen saturation probe site  4. Every 4-6 hours:  If on nasal continuous positive airway pressure, respiratory therapy assess nares and determine need for appliance change or resting period.   1/16/2023 0359 by Neymar Domingo RN  Outcome: Progressing  1/15/2023 1716 by Yao Greenfield RN  Outcome: Progressing

## 2023-01-16 NOTE — PROGRESS NOTES
[facility]  Office: [address], DO, Jeanna Joel, DO, Omar Manifold, DO, Juancarlos Nath Blood, DO, Greg Gar MD, Eder Staley MD, Juliet Massey MD, Edwardo Koroma MD,  Sarah De La Garza MD, Von Salinas MD, Jenniffer Sanches, DO, Ashley Canas MD,  Natalie Smith MD, Patricia Deng MD, Evin Dominguez, DO, Gerda Ponce MD, Noemí Abdullahi MD, Isi Keenan, DO, Baron Tubbs MD, Lorena Gray MD, Elodia Batres MD, Jesse Slater MD, Edin Camp, DO, Baljit Reynolds MD, Laurie Lamb MD, Aleksandra Paredes, CNP,  Alex Colby, CNP, Damari Phelps, CNP, Surinder Ledesma, CNP,  Dino Bhatt, DNP, Neha Fisher, CNP, Kristie Smalls, CNP, Malcolm Dupont, CNP, Allison Zuluaga, CNP, Adele Campbell, CNP, LOTUS BarreraC, Christofer Alert, CNS, Sofie Bustillo, CNP, Carmen Herndon, CNP         [address] [place] [address]    Progress Note    1/16/2023    7:27 AM    Name:   Suman Mancia  MRN:     [id number]     Acct:      [de-identified]   Room:   [address] [place] [address] Day:  7  Admit Date:  1/9/2023  8:01 PM    PCP:   Wendy Ambrocio MD  Code Status:  Full Code    Subjective:     C/C:   Chief Complaint   Patient presents with    Extremity Weakness    Altered Mental Status     Interval History Status: worsened. Vitals reviewed, afebrile and hemodynamically stable. Saturating well on room air. Labs reviewed, sodium has improved. Overnight patient had no significant events. On examination patient resting comfortably in bed. Awaiting nephrology clearance given hyponatremia and insurance approval for placement at [facility].     Brief History:     Mrs. Zee Deng is a 22-year-old female with significant past medical history of hypertension, CAD, PVD, hypothyroidism, osteopenia, osteoarthritis, DVT, CKD stage III [facility] and was unable to perform exercises due to flaccid paralysis of her left upper extremity and reduced sensation. She was noted to have significant hyponatremia of 116 and CTA showed left ICA aneurysm. Neurology and nephrology was consulted and patient was treated with hypertonic saline. Patient was taken for DSA on 1/11/2023 which confirmed a left ICA cavernous segment aneurysm and neurovascular neurosurgery decided to take patient for embolization of that aneurysm was transferred to neuro ICU. Patient was then transferred to medicine after neuro ICU monitoring following procedure. Review of Systems:     Constitutional:  negative for chills, fevers, sweats  Respiratory:  negative for cough, dyspnea on exertion, shortness of breath, wheezing  Cardiovascular:  negative for chest pain, chest pressure/discomfort, lower extremity edema, palpitations  Gastrointestinal:  negative for abdominal pain, constipation, diarrhea, nausea, vomiting  Neurological:  negative for dizziness, headache    Medications: Allergies: Allergies   Allergen Reactions    Doxycycline Other (See Comments)     GI upset.      Pcn [Penicillins] Hives    Pneumococcal Vaccines Hives     lg local reaction    Tetanus Toxoids        Current Meds:   Scheduled Meds:    vitamin D  50,000 Units Oral Weekly    sodium chloride flush  5-40 mL IntraVENous 2 times per day    sodium chloride flush  5-40 mL IntraVENous 2 times per day    sodium chloride flush  5-40 mL IntraVENous 2 times per day    clopidogrel  75 mg Oral Daily    aspirin EC  81 mg Oral Daily    levothyroxine  75 mcg Oral Daily    sodium chloride flush  5-40 mL IntraVENous 2 times per day    enoxaparin  40 mg SubCUTAneous Daily     Continuous Infusions:    sodium chloride      sodium chloride      sodium chloride      sodium chloride 20 mL/hr at 01/13/23 0545     PRN Meds: traMADol, sodium chloride flush, sodium chloride, sodium chloride flush, sodium chloride, ondansetron **OR** ondansetron, melatonin, sodium chloride flush, sodium chloride, sodium chloride flush, sodium chloride, polyethylene glycol, acetaminophen **OR** acetaminophen    Data:     Past Medical History:   has a past medical history of Arthritis, CAD (coronary artery disease), Edema, Hematuria, Hypotension, unspecified, Pure hypercholesterolemia, Specified congenital anomalies of hair, Unspecified hypothyroidism, and Unspecified vitamin D deficiency. Social History:   reports that she quit smoking about 14 years ago. Her smoking use included cigarettes. She has a 5.50 pack-year smoking history. She has never used smokeless tobacco. She reports that she does not drink alcohol and does not use drugs. Family History:   Family History   Adopted: Yes       Vitals:  BP (!) 124/53   Pulse 69   Temp 98.1 °F (36.7 °C) (Oral)   Resp 17   Ht 5' 3\" (1.6 m)   Wt 128 lb (58.1 kg)   SpO2 98%   BMI 22.67 kg/m²   Temp (24hrs), Av.1 °F (36.7 °C), Min:97.8 °F (36.6 °C), Max:98.5 °F (36.9 °C)    No results for input(s): POCGLU in the last 72 hours. I/O (24Hr):     Intake/Output Summary (Last 24 hours) at 2023 0727  Last data filed at 2023 0300  Gross per 24 hour   Intake --   Output 950 ml   Net -950 ml         Labs:  Hematology:  Recent Labs     01/14/23  0517 01/15/23  0239   WBC 8.7 8.8   RBC 2.43* 2.53*   HGB 7.2* 7.4*   HCT 22.3* 23.7*   MCV 91.8 93.7   MCH 29.6 29.2   MCHC 32.3 31.2   RDW 15.2* 15.5*    290   MPV 10.2 10.0       Chemistry:  Recent Labs     01/14/23  0517 01/15/23  0239 01/15/23  1044 01/15/23  2005 01/16/23  0310    127* 133* 129* 134*   K 3.9 4.2  --   --  4.6    97*  --   --  99   CO2 22 23  --   --  25   GLUCOSE 84 83  --   --  87   BUN 12 12  --   --  11   CREATININE 0.82 0.77  --   --  0.78   ANIONGAP 9 7*  --   --  10   LABGLOM >60 >60  --   --  >60   CALCIUM 8.3* 8.1*  --   --  8.5*       No results for input(s): PROT, LABALBU, LABA1C, N2ALAJI, W6PCMSC, FT4, TSH, AST, ALT, LDH, GGT, ALKPHOS, LABGGT, BILITOT, BILIDIR, AMMONIA, AMYLASE, LIPASE, LACTATE, CHOL, HDL, LDLCHOLESTEROL, CHOLHDLRATIO, TRIG, VLDL, QBL52OH, PHENYTOIN, PHENYF, URICACID, POCGLU in the last 72 hours. ABG:  Lab Results   Component Value Date/Time    FIO2 INFORMATION NOT PROVIDED 01/11/2023 10:33 AM     No results found for: SPECIAL  No results found for: CULTURE    Radiology:  XR ANKLE RIGHT (MIN 3 VIEWS)    Result Date: 1/12/2023  Previous ORIF of lateral and medial malleolar fracture. No hardware complication. No acute osseous findings. XR CHEST PORTABLE    Result Date: 1/10/2023  No acute process. Stable chest x-ray. MRI BRAIN WO CONTRAST    Result Date: 1/10/2023  No acute infarct or other acute intracranial process. Suspected 8 mm medially projecting left cavernous segment ICA aneurysm. Nonemergent CTA head imaging recommended. Physical Examination:        General appearance:  alert, cooperative and no distress  Mental Status:  oriented to person, place and time and normal affect  Lungs:  clear to auscultation bilaterally, normal effort  Heart:  regular rate and rhythm, no murmur  Abdomen:  soft, nontender, nondistended, normal bowel sounds, no masses, hepatomegaly, splenomegaly  Extremities: Right ankle/foot with previous Ace wrap from fracture.  No edema, redness, tenderness in the calves  Skin:  no gross lesions, rashes, induration    Assessment:        Hospital Problems             Last Modified POA    * (Principal) Hyponatremia 1/9/2023 Yes    Seizure-like activity ([address] 1/10/2023 Yes    Carotid aneurysm, left ([address] 1/10/2023 Yes    Altered mental status 1/10/2023 Yes    Cerebrovascular aneurysm 1/10/2023 Yes    TIA (transient ischemic attack) 1/10/2023 Yes    Low bicarbonate 1/13/2023 Yes    Hypothyroidism 1/10/2023 Yes    Overview Signed 5/5/2020 10:19 AM by Yves Miles MD     Consulted By Dr. Kelly Goltz 2020         Pure hypercholesterolemia 1/10/2023 Yes    Vitamin D deficiency 1/10/2023 Yes    Arthritis 1/10/2023 Yes    Overview Addendum 5/5/2020 10:20 AM by Ramón Hinojosa MD     Knee ; On Tramadol from rheumatology          CAD (coronary artery disease) 1/10/2023 Yes    Overview Signed 2/7/2017  8:47 AM by Ramón Hinojosa MD     Feb 2016 Nuc stress test.          Osteopenia 1/10/2023 Yes    Overview Addendum 2/2/2016 10:15 AM by Ramón Hinojosa MD     5/2014 DEXA was done by [facility]          History of DVT (deep vein thrombosis) 1/10/2023 Yes    Chronic renal failure, stage 3b ([address] 1/10/2023 Yes     Plan:        Hyponatremia. 134 today. Discussed with nephrology. Awaiting clearance for discharge     Left ICA aneurysm. Status post embolization. Continue aspirin 81 mg and Plavix 75 mg daily. Follow-up with endovascular in 2 weeks. Dr. Willian Ruiz in 2 weeks and Dr. Arden Francois in 3 months. Anemia. Stable. CAD. Continue aspirin 81 mg daily. Hypothyroidism. Continue levothyroxine 75 mcg daily. Vitamin D deficiency. Vitamin D 50,000 units weekly x 8 weeks followed by 1000 units weekly thereafter. Osteoarthritis. Tylenol and Ultram as needed. Osteopenia. Continue vitamin D. History of DVT. On Lovenox for DVT prophylaxis. DVT prophylaxis: Lovenox 40 mg daily. GI prophylaxis: No indication at this time. Discharge planning: Awaiting pre-CERT at [facility]. Plan for discharge today pending nephrology input as well as insurance approval for placement.     Bryce Dunne DO  1/16/2023  7:27 AM

## 2023-01-16 NOTE — PROGRESS NOTES
Endovascular Neurosurgery Progress Note    SUBJECTIVE:   No acute overnight events. No headache, no new neurological symptoms, no bleeding or pain in groin. Review of Systems:  CONSTITUTIONAL:  negative for fevers, chills, fatigue and malaise    EYES:  negative for double vision, blurred vision and photophobia     HEENT:  negative for tinnitus, epistaxis and sore throat    RESPIRATORY:  negative for cough, shortness of breath, wheezing    CARDIOVASCULAR:  negative for chest pain, palpitations, syncope, edema    GASTROINTESTINAL:  negative for nausea, vomiting    GENITOURINARY:  negative for incontinence    MUSCULOSKELETAL:  negative for neck or back pain    NEUROLOGICAL:  Negative for weakness and tingling  negative for headaches and dizziness    PSYCHIATRIC:  negative for anxiety      Review of systems otherwise negative. OBJECTIVE:     Vitals:    01/16/23 0600   BP: (!) 124/53   Pulse: 69   Resp: 17   Temp:    SpO2: 98%        General:  Gen: normal habitus, NAD  HEENT: NCAT, mucosa moist  Cvs: RRR, S1 S2 normal  Resp: symmetric unlabored breathing  Abd: s/nd/nt  Ext: no edema  Skin: no lesions seen, warm and dry    Neuro:  Gen: awake and alert, oriented x3. Lang/speech: no aphasia or dysarthria. Follows commands. CN: PERRL, EOMI, VFF, V1-3 intact, face symmetric, hearing intact, shoulder shrug symmetric, tongue midline  Motor: grossly 5/5 UE and LE b/l  Sense: LT intact in all 4 ext. Coord: FTN and HTS intact b/l  DTR: deferred  Gait: deferred    NIH Stroke Scale:   1a  Level of consciousness: 0 - alert; keenly responsive   1b. LOC questions:  0 - answers both questions correctly   1c. LOC commands: 0 - performs both tasks correctly   2. Best Gaze: 0 - normal   3. Visual: 0 - no visual loss   4. Facial Palsy: 1 - minor paralysis (flattened nasolabial fold, asymmetric on smiling)   5a. Motor left arm: 0 - no drift, limb holds 90 (or 45) degrees for full 10 seconds   5b.   Motor right arm: 0 - no drift, limb holds 90 (or 45) degrees for full 10 seconds   6a. Motor left le - no drift; leg holds 30 degree position for full 5 seconds   6b  Motor right le - no drift; leg holds 30 degree position for full 5 seconds   7. Limb Ataxia: 0 - absent   8. Sensory: 0 - normal; no sensory loss   9. Best Language:  0 - no aphasia, normal   10. Dysarthria: 0 - normal   11. Extinction and Inattention: 0 - no abnormality         Total:   1     LABS:   Reviewed. Lab Results   Component Value Date    HGB 7.4 (L) 01/15/2023    WBC 8.8 01/15/2023     01/15/2023     (L) 2023    BUN 11 2023    CREATININE 0.78 2023    AST 41 (H) 2023    ALT 22 2023    MG 1.5 (L) 01/10/2023    INR 1.0 2023      No results found for: COVID19    RADIOLOGY:   Images were personally reviewed including:        IR :   1. Left ICA medially oriented cavernous segment aneurysm with measurements?neck 7.64 mm, length 10.69 mm, width 6.98 mm, height 8.99 mm? 2. The above lesion was treated with 6 fr long sheath, Phenom-27 microcatheter, Pipeline embolization device 5x20mm. Sceptor 4 x11 mm balloon was then used to achieve balloon angioplasty to achieve appropriate wall apposition the flow diverter. 3.   Well apposed flow diverter, Meredeth Root score-3? 4. Left dorsal ophthalmic artery              Diagnostic Cerebral Angiogram done on 2023  Left ICA wide necked, medially oriented, side wall, cavernous segment aneurysm with measurements neck 7.64 mm, length 10.69 mm, width 6.98 mm, height 8.99 mm   Right fetal PCA   Left subclavian artery origin occlusion         CTA head done on  2023 in an outside hospital: left ICA cavernous aneurysm 8mm     MRI brain 1/10/2023: no acute changes    ASSESSMENT:   [de-identified]year old woman with CKD stage 3, hypothyroidism, fell 3 weeks ago and broke her right foot, p/w acute left hemiparesis that resolved by the time she is in the ED.  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 MRI brain is neg for stroke  - s/p left ICA cavernous segment aneurysm pipeline embolization on 1/13/2023  - bilateral groin without active bleeding and intact peripheral pulses  - hyponatremia improving Na 134 today  - hemoglobin 7.4 on 1/15  - -130    PLAN:   - s/p left ICA cavernous segment aneurysm pipeline embolization on 01/12  - SBP goal 100-140  - continue with aspirin and Plavix  - Bed hold at [facility] awaiting pre-cert  - follow up with Dr. Deon Anthony 2 weeks after discharge and Dr. Deepa Collins 3 months after discharge    Case discussed with Dr. Jacquelin Zhong fellow and Dr. Deepa Collins attending.     Fabián Man OMS3  Stroke, [facility]  [facility]  Electronically signed 1/16/2023 at 7:27 AM

## 2023-01-16 NOTE — PLAN OF CARE
Problem: Discharge Planning  Goal: Discharge to home or other facility with appropriate resources  Outcome: Progressing     Problem: Pain  Goal: Verbalizes/displays adequate comfort level or baseline comfort level  Outcome: Progressing     Problem: Confusion  Goal: Confusion, delirium, dementia, or psychosis is improved or at baseline  Description: INTERVENTIONS:  1. Assess for possible contributors to thought disturbance, including medications, impaired vision or hearing, underlying metabolic abnormalities, dehydration, psychiatric diagnoses, and notify attending LIP  2. [place] high risk fall precautions, as indicated  3. Provide frequent short contacts to provide reality reorientation, refocusing and direction  4. Decrease environmental stimuli, including noise as appropriate  5. Monitor and intervene to maintain adequate nutrition, hydration, elimination, sleep and activity  6. If unable to ensure safety without constant attention obtain sitter and review sitter guidelines with assigned personnel  7. Initiate Psychosocial CNS and Spiritual Care consult, as indicated  Outcome: Progressing     Problem: Safety - Adult  Goal: Free from fall injury  Outcome: Progressing  Flowsheets (Taken 1/16/2023 1800)  Free From Fall Injury: Instruct family/caregiver on patient safety     Problem: ABCDS Injury Assessment  Goal: Absence of physical injury  Outcome: Progressing     Problem: Skin/Tissue Integrity  Goal: Absence of new skin breakdown  Description: 1.  Monitor for areas of redness and/or skin breakdown  2.  Assess vascular access sites hourly  3.  Every 4-6 hours minimum:  Change oxygen saturation probe site  4.  Every 4-6 hours:  If on nasal continuous positive airway pressure, respiratory therapy assess nares and determine need for appliance change or resting period.  Outcome: Progressing     Problem: Nutrition Deficit:  Goal: Optimize nutritional status  Outcome: Progressing

## 2023-01-16 NOTE — PROGRESS NOTES
Nephrology Progress Note    Patient:  Suman Mancia; [de-identified] y.o. MRN# [id number]  Location:  0115/0115-01  Attending:  Isi Keenan DO  Admit Date:  1/9/2023   Hospital Day: 7    Subjective   Patient  admitted on 1/9/2023 for AMS, strokelike presentation underwent DSA on 1/11/2023 which confirmed left ICA cavernous segment aneurysm. Patient went for embolization of aneurysm 1/13/2023. We have been consulted for hyponatremia which did resolve in the 131 range, we were reconsulted on 1/15/2023 for hyponatremia, sodium dropped down to 229 likely from IV fluids. Since then with fluid restriction and sodium up to 134. Urine studies again consistent with SIADH. Metabolic acidosis resolved    Was initially admitted from rehab facility where she was recovering after a open reduction internal fixation of her ankle injury. She had presented with left upper and lower extremity weakness for potential stroke. Found to have incidental left ICA aneurysm. Nephrology was consulted for hyponatremia sodium was 116. Urine studies consistent with SIADH. Labs reviewed. Sodium 132 (134, 129, 133), creatinine 0.78mg/dL, GFR>60, calcium 8.5, potassium 4.6, bicarb 25. Urine studies 1/15 reviewed. Urine osmolality 414, sodium 122. Previous 1/9 urine studies similar osmolality 442, 114, chloride 92. Pt seen and examined. No complaints at this time, asymptomatic. AAOx3. Eating and drinking well. Denies urinary symptoms. Not on IV fluids. Pt is not on salt tabs outpatient, note she had low sodium on a previous admit for b/l DVTs that resolved by discharge when IV fluids were stopped. Placement pending insurance back to [facility].        Recent Labs     01/14/23  0517 01/15/23  0239 01/15/23  1044 01/15/23  2005 01/16/23  0310 01/16/23  0710    127*   < > 129* 134* 132*   K 3.9 4.2  --   --  4.6  --     97*  --   --  99  --    CO2 22 23  --   --  25  --    BUN 12 12  --   --  11  --    CREATININE 0.82 0.77 --   --  0.78  --    GLUCOSE 84 83  --   --  87  --    CALCIUM 8.3* 8.1*  --   --  8.5*  --     < > = values in this interval not displayed. Objective   VS: BP (!) 124/53   Pulse 69   Temp 98.1 °F (36.7 °C) (Oral)   Resp 17   Ht 5' 3\" (1.6 m)   Wt 128 lb (58.1 kg)   SpO2 98%   BMI 22.67 kg/m²   MAXIMUM TEMPERATURE OVER 24 HRS:  Temp (24hrs), Av.2 °F (36.8 °C), Min:97.8 °F (36.6 °C), Max:98.5 °F (36.9 °C)    24 HR BLOOD PRESSURE RANGE:  Systolic (92FYX), WQY:547 , Min:92 , QHP:882   ; Diastolic (73WYC), BKM:85, Min:51, Max:66    24 HR INTAKE/OUTPUT:    Intake/Output Summary (Last 24 hours) at 2023 1157  Last data filed at 2023 0300  Gross per 24 hour   Intake --   Output 950 ml   Net -950 ml     WEIGHT: Patient Vitals for the past 96 hrs (Last 3 readings):   Weight   23 0600 128 lb (58.1 kg)   23 0456 123 lb 14.4 oz (56.2 kg)       Current Medications    Scheduled Meds:    vitamin D  50,000 Units Oral Weekly    sodium chloride flush  5-40 mL IntraVENous 2 times per day    sodium chloride flush  5-40 mL IntraVENous 2 times per day    sodium chloride flush  5-40 mL IntraVENous 2 times per day    clopidogrel  75 mg Oral Daily    aspirin EC  81 mg Oral Daily    levothyroxine  75 mcg Oral Daily    sodium chloride flush  5-40 mL IntraVENous 2 times per day    enoxaparin  40 mg SubCUTAneous Daily     Continuous Infusions:    sodium chloride      sodium chloride      sodium chloride      sodium chloride 20 mL/hr at 23 0545     PRN Meds:  traMADol, sodium chloride flush, sodium chloride, sodium chloride flush, sodium chloride, ondansetron **OR** ondansetron, melatonin, sodium chloride flush, sodium chloride, sodium chloride flush, sodium chloride, polyethylene glycol, acetaminophen **OR** acetaminophen    Physical Examination     General:  AAO x 3, speaking in full sentences, no accessory muscle use. Chest:   Bilateral vesicular breath sounds, no rales or wheezes.   Cardiac:  S1 S2 RR, no murmurs, gallops or rubs, JVP not raised. Abdomen: Soft, non-tender, non distended, BS audible. SKIN:  No rashes, good skin turgor. Extremities:  No edema, no clubbing, No cyanosis, thin limbs with muscle wasting, right lower extremity in surgical dressing  Neuro:  AAO x 3, No FND. Labs      Recent Labs     01/14/23  0517 01/15/23  0239   WBC 8.7 8.8   RBC 2.43* 2.53*   HGB 7.2* 7.4*   HCT 22.3* 23.7*   MCV 91.8 93.7   MCH 29.6 29.2   MCHC 32.3 31.2   RDW 15.2* 15.5*    290   MPV 10.2 10.0      BMP:   Recent Labs     01/14/23  0517 01/15/23  0239 01/15/23  1044 01/15/23  2005 01/16/23  0310 01/16/23  0710    127*   < > 129* 134* 132*   K 3.9 4.2  --   --  4.6  --     97*  --   --  99  --    CO2 22 23  --   --  25  --    BUN 12 12  --   --  11  --    CREATININE 0.82 0.77  --   --  0.78  --    GLUCOSE 84 83  --   --  87  --    CALCIUM 8.3* 8.1*  --   --  8.5*  --     < > = values in this interval not displayed. Urinalysis/Chemistries      Lab Results   Component Value Date/Time    NITRU NEGATIVE 01/09/2023 08:31 PM    COLORU Yellow 01/09/2023 08:31 PM    PHUR 7.5 01/09/2023 08:31 PM    WBCUA None 01/09/2023 08:31 PM    RBCUA 50  01/09/2023 08:31 PM    CLARITYU clear 09/21/2022 10:13 AM    SPECGRAV 1.042 01/09/2023 08:31 PM    LEUKOCYTESUR NEGATIVE 01/09/2023 08:31 PM    UROBILINOGEN Normal 01/09/2023 08:31 PM    BILIRUBINUR NEGATIVE 01/09/2023 08:31 PM    BILIRUBINUR neg 09/21/2022 10:13 AM    BLOODU moderate** 09/21/2022 10:13 AM    GLUCOSEU NEGATIVE 01/09/2023 08:31 PM    KETUA MODERATE 01/09/2023 08:31 PM       Radiology    XR ANKLE RIGHT (MIN 3 VIEWS)    Result Date: 1/12/2023  Previous ORIF of lateral and medial malleolar fracture. No hardware complication. No acute osseous findings. XR CHEST PORTABLE    Result Date: 1/10/2023  No acute process. Stable chest x-ray.      MRI BRAIN WO CONTRAST    Result Date: 1/10/2023  No acute infarct or other acute intracranial process. Suspected 8 mm medially projecting left cavernous segment ICA aneurysm. Nonemergent CTA head imaging recommended. Assessment    Symptomatic hyponatremia: Resolved after initial relapse from IV fluids, sodium now up to 134. Hyponatremia most likely due to SIADH from pain and discomfort   metabolic acidosis: Improved; most recent bicarbonate 25. Confusion: Improved. Plan   Continue free water restriction. Avoid IV fluid resuscitations. Will likely sign off. Nutrition   Renal Diet/TF    Toni Ashford, MS4    Patient seen with medical student. See above assessment and plan. Sodium corrected, fluid restriction to continue. Nephrology signing off    Attending Physician Statement  I have discussed the care of Mary Madsen, including pertinent history and exam findings with the resident/fellow. I have reviewed the key elements of all parts of the encounter with the resident/fellow. I have seen and examined the patient with the resident/fellow. I agree with the assessment and plan and status of the problem list as documented.       .  Electronically signed by Padmini Jean MD on 1/16/2023 at 4:06 PM

## 2023-01-16 NOTE — PROGRESS NOTES
Comprehensive Nutrition Assessment    Type and Reason for Visit:  Initial, RD Nutrition Re-Screen/LOS    Nutrition Recommendations/Plan:   Continue current diet. Encourage/monitor PO intakes as tolerated. Monitor need for oral supplements. Will monitor for bowel function. Monitor labs, weights, and plan of care. Malnutrition Assessment:  Malnutrition Status: At risk for malnutrition (01/16/23 1550)    Context:  Acute Illness     Findings of the 6 clinical characteristics of malnutrition:  Energy Intake:  Mild decrease in energy intake  Weight Loss:  No significant weight loss     Body Fat Loss:  Mild body fat loss Orbital   Muscle Mass Loss:  Mild muscle mass loss Temples (temporalis)  Fluid Accumulation:  No significant fluid accumulation   Strength:  Not Performed    Nutrition Assessment:    Chart reviewed/pt seen for length of stay. Admitted for AMS, hyponatremia, and extremity weakness. PMH: HTN, CAD, PVD, hypothyroidism, osteopenia, osteoarthritis, DVT, CKD. S/p DSA on 1/11/2023 which confirmed a left ICA cavernous segment aneurysm - s/p embolization of that aneurysm. Pt reports she has a pretty good appetite. Reports did not like oatmeal for breakfast but was brought 3 english muffins, orange juice, and coffee (consumed 100%). Reports she did not really like chicken pot pie but did eat some of it. Pt with multiple snacks in room. Per chart review, recorded PO intakes of % of meals. No recorded BM since admission. Labs reviewed: Na 132 mmol/L. Meds reviewed. Nutrition Related Findings:    Labs/Meds reviewed. No recorded BM since admission. Wound Type: None       Current Nutrition Intake & Therapies:    Average Meal Intake: 51-75%, %  Average Supplements Intake: None Ordered  ADULT DIET;  Regular; 1200 ml    Anthropometric Measures:  Height: 5' 3\" (160 cm)  Ideal Body Weight (IBW): 115 lbs (52 kg)    Admission Body Weight: 119 lb 11.2 oz (54.3 kg)  Current Body Weight: 128 lb (58.1 kg), 111.3 % IBW. Weight Source: Bed Scale  Current BMI (kg/m2): 22.7  Usual Body Weight: 124 lb 6 oz (56.4 kg) (12/7/22 bed scale per chart review)  % Weight Change (Calculated): 2.9                    BMI Categories: Normal Weight (BMI 18.5-24. 9)    Estimated Daily Nutrient Needs:  Energy Requirements Based On: Kcal/kg  Weight Used for Energy Requirements: Current  Energy (kcal/day): 1500 kcals/day  Weight Used for Protein Requirements: Ideal  Protein (g/day): 75-80 gm pro/day  Method Used for Fluid Requirements: Other (Comment)  Fluid (ml/day): per MD/diet order: 1200 mL/day    Nutrition Diagnosis:   Inadequate oral intake related to  (current condition) as evidenced by  (variable PO intakes)    Nutrition Interventions:   Food and/or Nutrient Delivery: Continue Current Diet  Nutrition Education/Counseling: No recommendation at this time, Education not indicated  Coordination of Nutrition Care: Continue to monitor while inpatient  Plan of Care discussed with: Patient    Goals:  Previous Goal Met:  (Goal Set)  Goals: Meet at least 75% of estimated needs, prior to discharge       Nutrition Monitoring and Evaluation:   Behavioral-Environmental Outcomes: None Identified  Food/Nutrient Intake Outcomes: Food and Nutrient Intake  Physical Signs/Symptoms Outcomes: Biochemical Data, GI Status, Constipation, Fluid Status or Edema, Nutrition Focused Physical Findings, Skin, Weight    Discharge Planning:     Too soon to determine     Doris Frederick [address] [place] [address],   Contact: [phone]

## 2023-01-17 VITALS
OXYGEN SATURATION: 99 % | SYSTOLIC BLOOD PRESSURE: 122 MMHG | HEART RATE: 86 BPM | BODY MASS INDEX: 22.84 KG/M2 | TEMPERATURE: 98 F | HEIGHT: 63 IN | RESPIRATION RATE: 23 BRPM | DIASTOLIC BLOOD PRESSURE: 62 MMHG | WEIGHT: 128.9 LBS

## 2023-01-17 LAB
ANION GAP SERPL CALCULATED.3IONS-SCNC: 10 MMOL/L (ref 9–17)
BUN BLDV-MCNC: 15 MG/DL (ref 8–23)
CALCIUM SERPL-MCNC: 8.3 MG/DL (ref 8.6–10.4)
CHLORIDE BLD-SCNC: 96 MMOL/L (ref 98–107)
CO2: 23 MMOL/L (ref 20–31)
CREAT SERPL-MCNC: 0.89 MG/DL (ref 0.5–0.9)
GFR SERPL CREATININE-BSD FRML MDRD: >60 ML/MIN/1.73M2
GLUCOSE BLD-MCNC: 91 MG/DL (ref 70–99)
POTASSIUM SERPL-SCNC: 4.3 MMOL/L (ref 3.7–5.3)
SARS-COV-2, RAPID: NOT DETECTED
SODIUM BLD-SCNC: 129 MMOL/L (ref 135–144)
SODIUM BLD-SCNC: 133 MMOL/L (ref 135–144)
SPECIMEN DESCRIPTION: NORMAL

## 2023-01-17 PROCEDURE — 84295 ASSAY OF SERUM SODIUM: CPT

## 2023-01-17 PROCEDURE — 6370000000 HC RX 637 (ALT 250 FOR IP): Performed by: STUDENT IN AN ORGANIZED HEALTH CARE EDUCATION/TRAINING PROGRAM

## 2023-01-17 PROCEDURE — 87635 SARS-COV-2 COVID-19 AMP PRB: CPT

## 2023-01-17 PROCEDURE — 80048 BASIC METABOLIC PNL TOTAL CA: CPT

## 2023-01-17 PROCEDURE — 36415 COLL VENOUS BLD VENIPUNCTURE: CPT

## 2023-01-17 PROCEDURE — 6360000002 HC RX W HCPCS

## 2023-01-17 PROCEDURE — 97130 THER IVNTJ EA ADDL 15 MIN: CPT

## 2023-01-17 PROCEDURE — 6370000000 HC RX 637 (ALT 250 FOR IP)

## 2023-01-17 PROCEDURE — 99239 HOSP IP/OBS DSCHRG MGMT >30: CPT | Performed by: INTERNAL MEDICINE

## 2023-01-17 PROCEDURE — 97129 THER IVNTJ 1ST 15 MIN: CPT

## 2023-01-17 PROCEDURE — 2580000003 HC RX 258: Performed by: ANESTHESIOLOGY

## 2023-01-17 RX ORDER — TRAMADOL HYDROCHLORIDE 50 MG/1
50 TABLET ORAL EVERY 8 HOURS PRN
Qty: 9 TABLET | Refills: 0 | Status: SHIPPED | OUTPATIENT
Start: 2023-01-17 | End: 2023-01-20

## 2023-01-17 RX ORDER — CLOPIDOGREL BISULFATE 75 MG/1
75 TABLET ORAL DAILY
Qty: 30 TABLET | Refills: 3 | DISCHARGE
Start: 2023-01-18

## 2023-01-17 RX ORDER — ENOXAPARIN SODIUM 100 MG/ML
40 INJECTION SUBCUTANEOUS DAILY
DISCHARGE
Start: 2023-01-18

## 2023-01-17 RX ORDER — CHOLECALCIFEROL (VITAMIN D3) 125 MCG
5 CAPSULE ORAL NIGHTLY PRN
Refills: 0 | DISCHARGE
Start: 2023-01-17

## 2023-01-17 RX ORDER — ERGOCALCIFEROL 1.25 MG/1
50000 CAPSULE ORAL WEEKLY
Qty: 5 CAPSULE | DISCHARGE
Start: 2023-01-20

## 2023-01-17 RX ORDER — POLYETHYLENE GLYCOL 3350 17 G/17G
17 POWDER, FOR SOLUTION ORAL DAILY PRN
Qty: 527 G | Refills: 1 | DISCHARGE
Start: 2023-01-17 | End: 2023-02-16

## 2023-01-17 RX ORDER — MIDODRINE HYDROCHLORIDE 5 MG/1
5 TABLET ORAL 3 TIMES DAILY PRN
Qty: 90 TABLET | Refills: 3 | DISCHARGE
Start: 2023-01-17

## 2023-01-17 RX ADMIN — Medication 81 MG: at 08:09

## 2023-01-17 RX ADMIN — SODIUM CHLORIDE, PRESERVATIVE FREE 10 ML: 5 INJECTION INTRAVENOUS at 08:09

## 2023-01-17 RX ADMIN — ENOXAPARIN SODIUM 40 MG: 100 INJECTION SUBCUTANEOUS at 08:09

## 2023-01-17 RX ADMIN — CLOPIDOGREL 75 MG: 75 TABLET, FILM COATED ORAL at 08:09

## 2023-01-17 RX ADMIN — LEVOTHYROXINE SODIUM 75 MCG: 75 TABLET ORAL at 06:51

## 2023-01-17 NOTE — PLAN OF CARE
Problem: Discharge Planning  Goal: Discharge to home or other facility with appropriate resources  Outcome: Adequate for Discharge     Problem: Pain  Goal: Verbalizes/displays adequate comfort level or baseline comfort level  Outcome: Adequate for Discharge     Problem: Confusion  Goal: Confusion, delirium, dementia, or psychosis is improved or at baseline  Description: INTERVENTIONS:  1. Assess for possible contributors to thought disturbance, including medications, impaired vision or hearing, underlying metabolic abnormalities, dehydration, psychiatric diagnoses, and notify attending LIP  2. [place] high risk fall precautions, as indicated  3. Provide frequent short contacts to provide reality reorientation, refocusing and direction  4. Decrease environmental stimuli, including noise as appropriate  5. Monitor and intervene to maintain adequate nutrition, hydration, elimination, sleep and activity  6. If unable to ensure safety without constant attention obtain sitter and review sitter guidelines with assigned personnel  7.  Initiate Psychosocial CNS and Spiritual Care consult, as indicated  Outcome: Adequate for Discharge     Problem: Safety - Adult  Goal: Free from fall injury  Outcome: Adequate for Discharge     Problem: ABCDS Injury Assessment  Goal: Absence of physical injury  Outcome: Adequate for Discharge

## 2023-01-17 NOTE — PROGRESS NOTES
Speech Language Pathology  Speech Language Pathology  [address] 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    Vitamin D deficiency E55.9    Arthritis M19.90    CAD (coronary artery disease) I25.10    Hearing loss H91.90    Osteopenia M85.80    History of DVT (deep vein thrombosis) Z86.718    Microhematuria R31.29    Chronic renal failure, stage 3b (HCC) N18.32    Hyponatremia E87.1    Seizure-like activity ([address] R56.9    Carotid aneurysm, left (HCC) I72.0    Altered mental status R41.82    Cerebrovascular aneurysm I67.1    TIA (transient ischemic attack) G45.9    Low bicarbonate E87.8       Pain: 0/10    Cognitive Treatment    Treatment time: 10:26-10:49      Subjective: [x] Alert [x] Cooperative     [] Confused     [] Agitated    [] Lethargic      Objective/Assessment:    Recall: Immediate Memory for Five-Unit Sequences 6/10 increased to 10/10 with repetition    Problem Solving/Reasoning: Determining Category Exclusions: 5/10 increased to 7/10 with min cues  Making Word Deductions: 7/10 increased to 10/10 with min cues  Category Members: [place]: 22/24 increased to 24/24 with min verbal cues  Multiple Definitions:6/10 increased to 8/10 with min verbal cues        Plan:  [x] Continue ST services    [] Discharge from :      Discharge recommendations: []  Further therapy recommended at discharge. The patient should be able to tolerate at least 3 hours of therapy per day over 5 days or 15 hours over 7 days. [x] Further therapy recommended at discharge. [] No therapy recommended at discharge. Treatment completed by: Completed by:  Viral Kim  Clinician    Cosigned By: Jackie ELLIOTT CCC/SLP

## 2023-01-17 NOTE — DISCHARGE SUMMARY
[facility]  Office: [address], DO, Nasra Gurmeet, DO, Ernesto Agustin, DO, Fariba Suarez, DO, Tonny Lozoya MD, Kassi Knight MD, Yolande Pereyra MD, Yadira Rae MD,  Kathe Diaz MD, Janet Olivia MD, Dipika Traylor, DO, Brittney Domínguez MD,  Reddy Michelle MD, Nish Kong MD, Serenity Powers, DO, Jonah Melchor MD, Tempie Mcburney, MD, Sarah Irenek, DO, Maia Kenney MD, Tiffanie Rice MD, Christian Miller MD, Dilia Carroll MD, Sharon Becerra DO, Aquiles Antony MD, Jethro Mckenzie MD, Sangeetha Reed, CNP,  Oscar Mello, CNP, Kae Dubin, CNP, Mariela Solomon, CNP,  Jeremy Chang, [facility], Micah Cooper, CNP, Fabiana Elaine, CNP, Carley Shelley, CNP, Sanjay Quiroz, CNP, Kenzie Lyn, CNP, Ricki Frausto PA-C, Farheen Atkins, [facility], Tara Corbett, CNP, Na Brown, [facility]    Discharge Summary     Patient ID: Gerhard Willard  :  1940   MRN: [id number]     ACCOUNT:  [de-identified]   Patient's PCP: Nerissa Mulligan MD  Admit Date: 2023   Discharge Date: 2023   Length of Stay: 8  Code Status:  Full Code  Admitting Physician: No admitting provider for patient encounter. Discharge Physician: Rosalba Chavez MD     Active Discharge Diagnoses:     Hospital Problem Lists:  Principal Problem:    Hyponatremia  Active Problems:    Seizure-like activity ([facility]    Carotid aneurysm, left (HCC)    Altered mental status    Cerebrovascular aneurysm    TIA (transient ischemic attack)    Low bicarbonate    Hypothyroidism    Pure hypercholesterolemia    Vitamin D deficiency    Arthritis    CAD (coronary artery disease)    Osteopenia    History of DVT (deep vein thrombosis)    Chronic renal failure, stage 3b (HCC)  Resolved Problems:    * No resolved hospital problems.  *      Admission Condition:  poor     Discharged Condition: fair    Hospital Stay:     Hospital Course: Zee Herman is a [de-identified] y.o. female who was admitted for the management of   Hyponatremia , presented to ER with Extremity Weakness and Altered Mental Status    Per my partner:  \"Mrs. Daniella Gonzalez is a 82-year-old female with significant past medical history of hypertension, CAD, PVD, hypothyroidism, osteopenia, osteoarthritis, DVT, CKD stage III who initially presented to outlying facility for physical therapy and was unable to perform exercises due to flaccid paralysis of her left upper extremity and reduced sensation. She was noted to have significant hyponatremia of 116 and CTA showed left ICA aneurysm. Neurology and nephrology was consulted and patient was treated with hypertonic saline. Patient was taken for DSA on 1/11/2023 which confirmed a left ICA cavernous segment aneurysm and neurovascular neurosurgery decided to take patient for embolization of that aneurysm was transferred to neuro ICU. Patient was then transferred to medicine after neuro ICU monitoring following procedure. \"    Patient's weakness on the left side resolved. Her electrolytes were corrected, Na- 133 on day of discharge. She is to continue with a 1200  ml fluid restriction daily. She is generally weak and has lost weight per her son. She needs con't therapy on discharge. She is stable for discharge. Assessment/ Plan  Hyponatremia- Na- 133 stable on 1200 ml fluid restriction, Nephrology signed off. Check BMP in 3 days, likely caused by Siadh and IVF  Left ICA aneurysm- s/p embolization, con't ASA and Plavix 75mg daily. F/u with Neuro endovascular Dr. Cammy Huynh in 2 weeks, and Dr Melissa Ziegler in 3 months  CAD- con't ASA  Anemia- Hb stable, no signs of bleeding  Hypothyroid- Cont Synthroid  Vit D def-replace Vit D 50,000 units weekly, then Vit D 1000 units daily there after  OA- Ultram Bid prn  Hx DVT- Con't Lovenox for prophylaxis  PT/OT     Okay to Discharge to [facility], [place] signed, Med rec completed.   I milo nurse and called patient's son, Julianne Nicholas, to give him an update. All questions answered, spent > 35 min on discharge plans    Significant therapeutic interventions: see above    Significant Diagnostic Studies:   Labs / Micro:  Na- 133    Radiology:  XR ANKLE RIGHT (MIN 3 VIEWS)    Result Date: 1/12/2023  Previous ORIF of lateral and medial malleolar fracture. No hardware complication. No acute osseous findings. XR CHEST PORTABLE    Result Date: 1/10/2023  No acute process. Stable chest x-ray. Consultations:    Consults:     Final Specialist Recommendations/Findings:   IP CONSULT TO NEPHROLOGY  IP CONSULT TO ENDOVASCULAR NEUROSURGERY      The patient was seen and examined on day of discharge and this discharge summary is in conjunction with any daily progress note from day of discharge. Discharge plan:     Disposition:  To a non-[facility] facility, [facility]    Physician Follow Up:     Neuro endovascular Dr. Lala Toney in 2 weeks, and Dr Eliane Quinn in 3 months    PCP- Dr Lima Kat in 1 week    Requiring Further Evaluation/Follow Up POST HOSPITALIZATION/Incidental Findings: Check BMP and CBC in 3 days    Diet: regular diet    Activity: As tolerated    Instructions to Patient: Suggest dietary supplements, like Ensure or Boost BID    Discharge Medications:      Medication List        START taking these medications      clopidogrel 75 MG tablet  Commonly known as: PLAVIX  Take 1 tablet by mouth daily  Start taking on: January 18, 2023     enoxaparin 40 MG/0.4ML  Commonly known as: LOVENOX  Inject 0.4 mLs into the skin daily  Start taking on: January 18, 2023     melatonin 5 MG Tabs tablet  Take 1 tablet by mouth nightly as needed (insomnia)     midodrine 5 MG tablet  Commonly known as: PROAMATINE  Take 1 tablet by mouth 3 times daily as needed (Give for systolic blood pressure less than 100.)     polyethylene glycol 17 g packet  Commonly known as: GLYCOLAX  Take 17 g by mouth daily as needed for Constipation     Vitamin D (Ergocalciferol) 81485 units Caps  Take 50,000 Units by mouth once a week  Start taking on: January 20, 2023            CHANGE how you take these medications      traMADol 50 MG tablet  Commonly known as: ULTRAM  Take 1 tablet by mouth every 8 hours as needed for Pain for up to 3 days. Max Daily Amount: 150 mg  What changed:   when to take this  reasons to take this            CONTINUE taking these medications      aspirin EC 81 MG EC tablet  Take 1 tablet by mouth daily     calcium carbonate-cholecalciferol 600-800 MG-UNIT Tabs     levothyroxine 75 MCG tablet  Commonly known as: SYNTHROID  Take 1 tablet by mouth Daily     therapeutic multivitamin-minerals tablet     vitamin C 500 MG tablet  Commonly known as: ASCORBIC ACID               Where to Get Your Medications        You can get these medications from any pharmacy    Bring a paper prescription for each of these medications  traMADol 50 MG tablet       Information about where to get these medications is not yet available    Ask your nurse or doctor about these medications  clopidogrel 75 MG tablet  enoxaparin 40 MG/0.4ML  melatonin 5 MG Tabs tablet  midodrine 5 MG tablet  polyethylene glycol 17 g packet  Vitamin D (Ergocalciferol) 92436 units Caps         No discharge procedures on file. Time Spent on discharge is  35 mins in patient examination, evaluation, counseling as well as medication reconciliation, prescriptions for required medications, discharge plan and follow up. Electronically signed by   Diego Birch MD  1/17/2023  11:22 AM      Thank you Dr. Leda Blackwell MD for the opportunity to be involved in this patient's care.

## 2023-01-17 NOTE — CARE COORDINATION
CM called and left message requesting call back for Savanna with [facility]. Await call back with update on precert. 0900- Call received from Adal Gomez with [facility] stating they have insurance authorization and patient can return today. Adal Gomez states that patient will need rapid COVID swab prior to discharge. CM [facility] and [facility] 11am. PS message sent to attending requesting discharge order, rapid covid and LYUDMILA completion. CM updated patient and she verbalizes being agreeable to discharging to [facility] today. CM called and updated patient's daughter Wayne Parraing. 1007-- CM confirmed with Adal Gomez from [facility] that they are able to accept patient with 11am transportation time.     RN to call report to [phone]      Discharge [facility] Case Management Department  Written by: Savanna Le RN    Patient Name: Dandrelashell Smith  Attending Provider: Diego Birch MD  Admit Date: 2023  8:01 PM  MRN: [id number]  Account: [de-identified]                     : 1940  Discharge Date: 23      Disposition: [facility]- Jeanna Kim, [facility] [place]

## 2023-01-17 NOTE — DISCHARGE INSTR - COC
Continuity of Care Form    Patient Name: Garrett Serna   :  1940  MRN:  [id number]    Admit date:  2023  Discharge date:  23    Code Status Order: Full Code   Advance Directives:   [facility] Documentation       Date/Time Healthcare Directive Type of Healthcare Directive Copy in [address] Agent's Name Healthcare Agent's Phone Number    23 1059 No, patient does not have an advance directive for healthcare treatment -- -- -- -- --            Admitting Physician:  No admitting provider for patient encounter. PCP: Jaja Johnson MD    Discharging Nurse: [facility] Unit/Room#: 0115/0115-01  Discharging Unit Phone Number: [phone]    Emergency Contact:   Extended Emergency Contact Information  Primary Emergency Contact: Uziel [address] Phone: [phone]  Mobile Phone: [phone]  Relation: Child  Secondary Emergency Contact: Maykel [address] Phone: [phone]  Relation: Child  Preferred language: English   needed?  No    Past Surgical History:  Past Surgical History:   Procedure Laterality Date    CARDIAC SURGERY  1996    cardiac stent     CAROTID ARTERY ANGIOPLASTY Bilateral 2023    ANGIOGRAM CAROTID CEREBRAL BILATERAL    CATARACT REMOVAL WITH IMPLANT Bilateral 2016    Dr Juan Greenwood    COLONOSCOPY  2008    COLONOSCOPY  2018    Dr. Jeni Fontaine        Immunization History:   Immunization History   Administered Date(s) Administered    COVID-19, MODERNA BLUE border, Primary or Immunocompromised, (age 12y+), IM, 100 mcg/0.5mL 2021, 2021, 2021    Influenza Vaccine, unspecified formulation 10/14/2016    Influenza Virus Vaccine 10/28/2011, 2012, 10/04/2013    Influenza, FLUZONE (age 72 y+), High Dose, 0.7mL 2020, 2022    Influenza, High Dose (Fluzone 65 yrs and older) 10/27/2014, 2015, 2018, 09/17/2019    Pneumococcal Conjugate 13-valent (Wdhyotu18) 08/03/2015    Pneumococcal Conjugate 7-valent (Prevnar7) 11/19/2012    Pneumococcal Polysaccharide ([id number]) 10/27/2010    Tdap (Boostrix, Adacel) 07/25/2012    Zoster Live (Zostavax) 10/04/2013       Active Problems:  Patient Active Problem List   Diagnosis Code    Hypothyroidism E03.9    Pure hypercholesterolemia E78.00    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    Vitamin D deficiency E55.9    Arthritis M19.90    CAD (coronary artery disease) I25.10    Hearing loss H91.90    Osteopenia M85.80    History of DVT (deep vein thrombosis) Z86.718    Microhematuria R31.29    Chronic renal failure, stage 3b (HCC) N18.32    Hyponatremia E87.1    Seizure-like activity (HCC) R56.9    Carotid aneurysm, left (HCC) I72.0    Altered mental status R41.82    Cerebrovascular aneurysm I67.1    TIA (transient ischemic attack) G45.9    Low bicarbonate E87.8       Isolation/Infection:   Isolation            No Isolation          Patient Infection Status       None to display            Nurse Assessment:  Last Vital Signs: BP (!) 108/55   Pulse 77   Temp 98.2 °F (36.8 °C) (Oral)   Resp 29   Ht 5' 3\" (1.6 m)   Wt 128 lb 14.4 oz (58.5 kg)   SpO2 98%   BMI 22.83 kg/m²     Last documented pain score (0-10 scale): Pain Level: 6  Last Weight:   Wt Readings from Last 1 Encounters:   01/17/23 128 lb 14.4 oz (58.5 kg)     Mental Status:  oriented, alert, coherent, logical, thought processes intact, and able to concentrate and follow conversation    IV Access:  - None    Nursing Mobility/ADLs:  Walking   Assisted  Transfer  Assisted  Bathing  Assisted  Dressing  Assisted  Toileting  Independent  Feeding  Independent  Med Admin  Assisted  Med Delivery   whole    Wound Care Documentation and Therapy:        Elimination:  Continence:    Bowel: Yes  Bladder: Yes  Urinary Catheter: None   Colostomy/Ileostomy/Ileal Conduit: No       Date of Last BM: 1/16/23    Intake/Output Summary (Last 24 hours) at 1/17/2023 0900  Last data filed at 1/17/2023 0702  Gross per 24 hour   Intake 80 ml   Output 1300 ml   Net -1220 ml     I/O last 3 completed shifts: In: [de-identified] [P.O.:80]  Out: 1800 [Urine:1800]    Safety Concerns:     History of Falls (last 30 days) and At Risk for Falls    Impairments/Disabilities:      None    Nutrition Therapy:  Current Nutrition Therapy:   - Oral Diet:  General    Routes of Feeding: None  Liquids: No Restrictions  Daily Fluid Restriction: yes - amount 1200 ml  Last Modified Barium Swallow with Video (Video Swallowing Test): not done    Treatments at the Time of Hospital Discharge:   Respiratory Treatments: none  Oxygen Therapy:  is not on home oxygen therapy. Ventilator:    - No ventilator support    Rehab Therapies: Physical Therapy and Occupational Therapy  Weight Bearing Status/Restrictions: Non-weight bearing on right leg  Other Medical Equipment (for information only, NOT a DME order):  walker and bath bench  Other Treatments: NA    Patient's personal belongings (please select all that are sent with patient):  None    RN SIGNATURE:  Electronically signed by Eboni Martino RN on 1/17/23 at 10:45 AM EST    CASE MANAGEMENT/SOCIAL WORK SECTION    Inpatient Status Date: 1/9/23    Readmission Risk Assessment Score:  Readmission Risk              Risk of Unplanned Readmission:  16           Discharging to Facility/ [address]    / signature: Electronically signed by Dee Yanes RN on 1/17/23 at 9:00 AM EST    PHYSICIAN SECTION    Prognosis: Fair    Condition at Discharge: Stable    Rehab Potential (if transferring to Rehab): Fair    Recommended Labs or Other Treatments After Discharge: Check BMP in 3 days    Physician Certification: I certify the above information and transfer of Fiorella Crooks  is necessary for the continuing treatment of the diagnosis listed and that she requires [facility] for less 30 days. Update Admission H&P: No change in H&P    PHYSICIAN SIGNATURE:  Electronically signed by Marisabel Kaur MD on 1/17/23 at 9:18 AM EST

## 2023-01-17 NOTE — PROGRESS NOTES
[facility]  Office: [address], DO, Raul Hammer, DO, Casi Martinez, DO, Yumiko Mondragonond Blood, DO, Jenniffer Shoemaker MD, Alvarez Newton MD, Maryjane Lam MD, Louis Burk MD,  Doreen Davis MD, Tra Becerril MD, Eddie Myers, DO, Gracie Lam MD,  Maria Teresa Barahona MD, Anca Barrett MD, Paradise Sommer, DO, Shady Campbell MD, Zac Burgos MD, [facility], DO, Betty uJan MD, Kori Chacon MD, Beatriz Lynn MD, Mikel Rey MD, Alayna Jones, DO, Neha Ragsdale MD, Newton Villegas MD, Rosie Lee, CNP,  Alpha Grain, CNP, Fernie Ing, CNP, Sd Comings, CNP,  Sara Kaiser, DNP, Madhu Chavez, CNP, Santos Herrera, CNP, Benigno Moore, CNP, Chastity Gonzalez, CNP, Zeina Gary, CNP, Payton Huertas PAHilarioC, Sanjay Rangel, CNS, Rojas Fakariadne, CNP, Lugene Nose, CNP         [address]    Progress Note    1/17/2023    8:14 AM    Name:   Eve Sever  MRN:     [id number]     Acct:      [de-identified]   Room:   [address] [place] [address] Day:  8  Admit Date:  1/9/2023  8:01 PM    PCP:   Yves Miles MD  Code Status:  Full Code    Subjective:     C/C:   Chief Complaint   Patient presents with    Extremity Weakness    Altered Mental Status     Interval History Status: improved. Patient feels okay today. No weakness on left side. Na- 133. She wants to know how to keep her sodium level up. She has transportation picking her up at 11am for discharge to [facility]. Brief History:     Per my partner:  \"Mrs. Vishnu Doan is a 42-year-old female with significant past medical history of hypertension, CAD, PVD, hypothyroidism, osteopenia, osteoarthritis, DVT, CKD stage III who initially presented to [facility] facility for physical therapy and was unable to perform exercises due to flaccid paralysis of her left upper extremity and reduced sensation.   She was noted to have significant hyponatremia of 116 and CTA showed left ICA aneurysm.  Neurology and nephrology was consulted and patient was treated with hypertonic saline.  Patient was taken for DSA on 1/11/2023 which confirmed a left ICA cavernous segment aneurysm and neurovascular neurosurgery decided to take patient for embolization of that aneurysm was transferred to neuro ICU.  Patient was then transferred to medicine after neuro ICU monitoring following procedure.\"    Review of Systems:     Constitutional:  negative for chills, fevers, sweats  Respiratory:  negative for cough, dyspnea on exertion, shortness of breath, wheezing  Cardiovascular:  negative for chest pain, chest pressure/discomfort, lower extremity edema, palpitations  Gastrointestinal:  negative for abdominal pain, constipation, diarrhea, nausea, vomiting  Neurological:  negative for dizziness, headache    Medications:     Allergies:    Allergies   Allergen Reactions    Doxycycline Other (See Comments)     GI upset.     Pcn [Penicillins] Hives    Pneumococcal Vaccines Hives     lg local reaction    Tetanus Toxoids        Current Meds:   Scheduled Meds:    vitamin D  50,000 Units Oral Weekly    sodium chloride flush  5-40 mL IntraVENous 2 times per day    sodium chloride flush  5-40 mL IntraVENous 2 times per day    sodium chloride flush  5-40 mL IntraVENous 2 times per day    clopidogrel  75 mg Oral Daily    aspirin EC  81 mg Oral Daily    levothyroxine  75 mcg Oral Daily    sodium chloride flush  5-40 mL IntraVENous 2 times per day    enoxaparin  40 mg SubCUTAneous Daily     Continuous Infusions:    sodium chloride      sodium chloride      sodium chloride      sodium chloride 20 mL/hr at 01/13/23 0545     PRN Meds: midodrine, traMADol, sodium chloride flush, sodium chloride, sodium chloride flush, sodium chloride, ondansetron **OR** ondansetron, melatonin, sodium chloride flush, sodium chloride, sodium chloride flush, sodium chloride, polyethylene glycol, acetaminophen  **OR** acetaminophen    Data:     Past Medical History:   has a past medical history of Arthritis, CAD (coronary artery disease), Edema, Hematuria, Hypotension, unspecified, Pure hypercholesterolemia, Specified congenital anomalies of hair, Unspecified hypothyroidism, and Unspecified vitamin D deficiency. Social History:   reports that she quit smoking about 14 years ago. Her smoking use included cigarettes. She has a 5.50 pack-year smoking history. She has never used smokeless tobacco. She reports that she does not drink alcohol and does not use drugs. Family History:   Family History   Adopted: Yes       Vitals:  BP (!) 108/55   Pulse 77   Temp 98.2 °F (36.8 °C) (Oral)   Resp 29   Ht 5' 3\" (1.6 m)   Wt 128 lb 14.4 oz (58.5 kg)   SpO2 98%   BMI 22.83 kg/m²   Temp (24hrs), Av.2 °F (36.8 °C), Min:97.9 °F (36.6 °C), Max:98.4 °F (36.9 °C)    No results for input(s): POCGLU in the last 72 hours. I/O (24Hr): Intake/Output Summary (Last 24 hours) at 2023 0814  Last data filed at 2023 2962  Gross per 24 hour   Intake 80 ml   Output 1300 ml   Net -1220 ml       Labs:  Hematology:  Recent Labs     01/15/23  0239   WBC 8.8   RBC 2.53*   HGB 7.4*   HCT 23.7*   MCV 93.7   MCH 29.2   MCHC 31.2   RDW 15.5*      MPV 10.0     Chemistry:  Recent Labs     01/15/23  0239 01/15/23  1044 23  0310 23  0710 23  1851 23  0321 23  0701   *   < > 134*   < > 131* 129* 133*   K 4.2  --  4.6  --   --  4.3  --    CL 97*  --  99  --   --  96*  --    CO2 23  --  25  --   --  23  --    GLUCOSE 83  --  87  --   --  91  --    BUN 12  --  11  --   --  15  --    CREATININE 0.77  --  0.78  --   --  0.89  --    ANIONGAP 7*  --  10  --   --  10  --    LABGLOM >60  --  >60  --   --  >60  --    CALCIUM 8.1*  --  8.5*  --   --  8.3*  --     < > = values in this interval not displayed.    No results for input(s): PROT, LABALBU, LABA1C, F0IAWMO, O4FAMZJ, FT4, TSH, AST, ALT, LDH, GGT, ALKPHOS, LABGGT, BILITOT, BILIDIR, AMMONIA, AMYLASE, LIPASE, LACTATE, CHOL, HDL, LDLCHOLESTEROL, CHOLHDLRATIO, TRIG, VLDL, NZW14NE, PHENYTOIN, PHENYF, URICACID, POCGLU in the last 72 hours. ABG:  Lab Results   Component Value Date/Time    FIO2 INFORMATION NOT PROVIDED 01/11/2023 10:33 AM     No results found for: SPECIAL  No results found for: CULTURE    Radiology:  XR ANKLE RIGHT (MIN 3 VIEWS)    Result Date: 1/12/2023  Previous ORIF of lateral and medial malleolar fracture. No hardware complication. No acute osseous findings. XR CHEST PORTABLE    Result Date: 1/10/2023  No acute process. Stable chest x-ray. MRI BRAIN WO CONTRAST    Result Date: 1/10/2023  No acute infarct or other acute intracranial process. Suspected 8 mm medially projecting left cavernous segment ICA aneurysm. Nonemergent CTA head imaging recommended.        Physical Examination:        General appearance:  alert, cooperative and no distress, pale  Mental Status:  oriented to person, place and time and normal affect  Lungs:  clear to auscultation bilaterally, normal effort  Heart:  regular rate and rhythm, no murmur  Abdomen:  soft, nontender, nondistended, normal bowel sounds, no masses, hepatomegaly, splenomegaly  Extremities:  right ankle ACE wrapped, no edema or redness  Skin:  no gross lesions, rashes, induration    Assessment:        Hospital Problems             Last Modified POA    * (Principal) Hyponatremia 1/9/2023 Yes    Seizure-like activity ([address] 1/10/2023 Yes    Carotid aneurysm, left ([address] 1/10/2023 Yes    Altered mental status 1/10/2023 Yes    Cerebrovascular aneurysm 1/10/2023 Yes    TIA (transient ischemic attack) 1/10/2023 Yes    Low bicarbonate 1/13/2023 Yes    Hypothyroidism 1/10/2023 Yes    Overview Signed 5/5/2020 10:19 AM by Stanley Nj MD     Consulted By Dr. Christin Kat 2020         Pure hypercholesterolemia 1/10/2023 Yes    Vitamin D deficiency 1/10/2023 Yes    Arthritis 1/10/2023 Yes    Overview Addendum 5/5/2020 10:20 AM by Calin Arnold MD     Knee ; On Tramadol from rheumatology          CAD (coronary artery disease) 1/10/2023 Yes    Overview Signed 2/7/2017  8:47 AM by Calin Arnold MD     Feb 2016 Nuc stress test.          Osteopenia 1/10/2023 Yes    Overview Addendum 2/2/2016 10:15 AM by Calin Arnold MD     5/2014 DEXA was done by [facility]          History of DVT (deep vein thrombosis) 1/10/2023 Yes    Chronic renal failure, stage 3b ([address] 1/10/2023 Yes       Plan:        Hyponatremia- Na- 133 stable on 1200 ml fluid restriction, Nephrology signed off. Check BMP in 3 days, likely caused by Siadh and IVF  Left ICA aneurysm- s/p embolization, con't ASA and Plavix 75mg daily. F/u with Neuro endovascular Dr. Tesfaye Brooks in 2 weeks, and Dr Shabana Lua in 3 months  CAD- con't ASA  Anemia- Hb stable, no signs of bleeding  Hypothyroid- Cont Synthroid  Vit D def-replace Vit D 50,000 units weekly, then Vit D 1000 units daily there after  OA- Ultram Bid prn  Hx DVT- Con't Lovenox for prophylaxis  PT/OT    Okay to Discharge to [facility], [place] signed, Med rec completed. I dw nurse and called patient's son, Dana Youngblood, to give him an update.   All questions answered, spent > 35 min on discharge plans    Linnette Shirley MD  1/17/2023  8:14 AM

## 2023-01-17 NOTE — PROGRESS NOTES
Patient's daughter called to check on her mom. She asked about her blood pressure and how it is doing also how she is sleeping. All questions answered, spoke about the midodrine ordered for low blood pressure.   All questions answered no other concerns voiced

## 2023-01-17 NOTE — PROGRESS NOTES
Endovascular Neurosurgery Progress Note    SUBJECTIVE:   No acute overnight events. No new neurological symptoms, no headache, no groin pain or bleeding. Review of Systems:  CONSTITUTIONAL:  negative for fevers, chills, fatigue and malaise    EYES:  negative for double vision, blurred vision and photophobia     HEENT:  negative for tinnitus, epistaxis and sore throat    RESPIRATORY:  negative for cough, shortness of breath, wheezing    CARDIOVASCULAR:  negative for chest pain, palpitations, syncope, edema    GASTROINTESTINAL:  negative for nausea, vomiting    GENITOURINARY:  negative for incontinence    MUSCULOSKELETAL:  negative for neck or back pain    NEUROLOGICAL:  Negative for weakness and tingling  negative for headaches and dizziness    PSYCHIATRIC:  negative for anxiety      Review of systems otherwise negative. OBJECTIVE:     Vitals:    01/17/23 0702   BP:    Pulse: 77   Resp: 29   Temp:    SpO2:         General:  Gen: normal habitus, NAD  HEENT: NCAT, mucosa moist  Cvs: RRR, S1 S2 normal  Resp: symmetric unlabored breathing  Abd: s/nd/nt  Ext: no edema  Skin: no lesions seen, warm and dry     Neuro:  Gen: awake and alert, oriented x3. Lang/speech: no aphasia or dysarthria. Follows commands. CN: PERRL, EOMI, VFF, V1-3 intact, face symmetric, hearing intact, shoulder shrug symmetric, tongue midline  Motor: grossly 5/5 UE and LE b/l  Sense: LT intact in all 4 ext. Coord: FTN and HTS intact b/l  DTR: deferred  Gait: deferred    NIH Stroke Scale:   1a  Level of consciousness: 0 - alert; keenly responsive   1b. LOC questions:  0 - answers both questions correctly   1c. LOC commands: 0 - performs both tasks correctly   2. Best Gaze: 0 - normal   3. Visual: 0 - no visual loss   4. Facial Palsy: 1 - minor paralysis (flattened nasolabial fold, asymmetric on smiling)   5a. Motor left arm: 0 - no drift, limb holds 90 (or 45) degrees for full 10 seconds   5b.   Motor right arm: 0 - no drift, limb holds 90 (or 45) degrees for full 10 seconds   6a. Motor left le - no drift; leg holds 30 degree position for full 5 seconds   6b  Motor right le - no drift; leg holds 30 degree position for full 5 seconds   7. Limb Ataxia: 0 - absent   8. Sensory: 0 - normal; no sensory loss   9. Best Language:  0 - no aphasia, normal   10. Dysarthria: 0 - normal   11. Extinction and Inattention: 0 - no abnormality         Total:   1     LABS:   Reviewed. Lab Results   Component Value Date    HGB 7.4 (L) 01/15/2023    WBC 8.8 01/15/2023     01/15/2023     (L) 2023    BUN 15 2023    CREATININE 0.89 2023    AST 41 (H) 2023    ALT 22 2023    MG 1.5 (L) 01/10/2023    INR 1.0 2023      No results found for: COVID19    RADIOLOGY:   Images were personally reviewed including:     IR :   1. Left ICA medially oriented cavernous segment aneurysm with measurements?neck 7.64 mm, length 10.69 mm, width 6.98 mm, height 8.99 mm? 2. The above lesion was treated with 6 fr long sheath, Phenom-27 microcatheter, Pipeline embolization device 5x20mm. Sceptor 4 x11 mm balloon was then used to achieve balloon angioplasty to achieve appropriate wall apposition the flow diverter. 3.   Well apposed flow diverter, Alice Slice score-3? 4. Left dorsal ophthalmic artery              Diagnostic Cerebral Angiogram done on 2023  Left ICA wide necked, medially oriented, side wall, cavernous segment aneurysm with measurements neck 7.64 mm, length 10.69 mm, width 6.98 mm, height 8.99 mm   Right fetal PCA   Left subclavian artery origin occlusion         CTA head done on  2023 in an outside hospital: left ICA cavernous aneurysm 8mm     MRI brain 1/10/2023: no acute changes    ASSESSMENT:   [de-identified]year old woman with CKD stage 3, hypothyroidism, fell 3 weeks ago and broke her right foot, p/w acute left hemiparesis that resolved by the time she is in the ED.  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 MRI brain is neg for stroke  - s/p left ICA cavernous segment aneurysm pipeline embolization on 1/13/2023  - bilateral groin without active bleeding and intact peripheral pulses  - Na 133 today  - hemoglobin 7.4 on 1/15  - SBP     PLAN:   - s/p left ICA cavernous segment aneurysm pipeline embolization on 01/12  - SBP goal 100-140  - continue with aspirin and Plavix  - [facility] authorization done  - follow up with Dr. Antionette Bright 2 weeks after discharge and Dr. Betsy James 3 months after discharge    Case discussed with Dr. Dixon Rebolledo fellow and Dr. Betsy James attending.     Jacqueline Bunn, S3  Stroke, [facility]  [facility]  Electronically signed 1/17/2023 at 7:41 AM

## 2023-02-24 ENCOUNTER — TELEPHONE (OUTPATIENT)
Dept: NEUROSURGERY | Age: 83
End: 2023-02-24

## 2023-02-24 NOTE — LETTER
[address] [facility], [address] [place] [address] [address] [place] [address]  Phone: [phone]  Fax: [phone]       2/24/2023    Diane Locke  [address] [facility] [address]    Dear Rojas Syed,    You recently missed a scheduled appointment with Dr Vandana Cesar on 2/24/2023. Your health and follow-up medical care are important to us. Please call our office as soon as possible so that we may reschedule your appointment. If you have already rescheduled your appointment, please disregard this letter. This is the first scheduled appointment that you have missed within the last twelve months. If you miss 2 more appointment, you may be dismissed from the practice. For future appointments that you are unable to keep, please call the office at [phone] at least 24 hours in advance to cancel and reschedule.      Sincerely,      VALENTINA Leal

## 2023-11-21 NOTE — PLAN OF CARE
Problem: Discharge Planning  Goal: Discharge to home or other facility with appropriate resources  1/15/2023 0333 by Brock Neal RN  Outcome: Progressing  1/14/2023 1801 by Darnell Clement RN  Outcome: Progressing     Problem: Pain  Goal: Verbalizes/displays adequate comfort level or baseline comfort level  1/15/2023 0333 by Brock Neal RN  Outcome: Progressing  1/14/2023 1801 by Darnell Clement RN  Outcome: Progressing     Problem: Confusion  Goal: Confusion, delirium, dementia, or psychosis is improved or at baseline  Description: INTERVENTIONS:  1. Assess for possible contributors to thought disturbance, including medications, impaired vision or hearing, underlying metabolic abnormalities, dehydration, psychiatric diagnoses, and notify attending LIP  2. [place] high risk fall precautions, as indicated  3. Provide frequent short contacts to provide reality reorientation, refocusing and direction  4. Decrease environmental stimuli, including noise as appropriate  5. Monitor and intervene to maintain adequate nutrition, hydration, elimination, sleep and activity  6. If unable to ensure safety without constant attention obtain sitter and review sitter guidelines with assigned personnel  7. Initiate Psychosocial CNS and Spiritual Care consult, as indicated  1/15/2023 0333 by Brock Neal RN  Outcome: Progressing  1/14/2023 1801 by Darnell Clement RN  Outcome: Progressing     Problem: Safety - Adult  Goal: Free from fall injury  1/15/2023 0333 by Brock Neal RN  Outcome: Progressing  1/14/2023 1801 by Darnell Clement RN  Outcome: Progressing     Problem: ABCDS Injury Assessment  Goal: Absence of physical injury  1/15/2023 0333 by Brock Neal RN  Outcome: Progressing  1/14/2023 1801 by Darnell Clement RN  Outcome: Progressing     Problem: Skin/Tissue Integrity  Goal: Absence of new skin breakdown  Description: 1. Monitor for areas of redness and/or skin breakdown  2.   Assess vascular access sites hourly  3. Every 4-6 hours minimum:  Change oxygen saturation probe site  4. Every 4-6 hours:  If on nasal continuous positive airway pressure, respiratory therapy assess nares and determine need for appliance change or resting period.   1/15/2023 0333 by Eliane Charles RN  Outcome: Progressing  1/14/2023 1801 by Porter Varela RN  Outcome: Progressing 170.18